# Patient Record
Sex: FEMALE | Race: WHITE | Employment: OTHER | URBAN - METROPOLITAN AREA
[De-identification: names, ages, dates, MRNs, and addresses within clinical notes are randomized per-mention and may not be internally consistent; named-entity substitution may affect disease eponyms.]

---

## 2016-11-25 LAB
CHOLESTEROL, TOTAL: NORMAL MG/DL
CHOLESTEROL/HDL RATIO: NORMAL
HDLC SERPL-MCNC: NORMAL MG/DL (ref 35–70)
LDL CHOLESTEROL CALCULATED: 85 MG/DL (ref 0–160)
TRIGL SERPL-MCNC: NORMAL MG/DL
VLDLC SERPL CALC-MCNC: NORMAL MG/DL

## 2017-02-08 ENCOUNTER — OFFICE VISIT (OUTPATIENT)
Dept: FAMILY MEDICINE CLINIC | Age: 67
End: 2017-02-08

## 2017-02-08 VITALS
HEIGHT: 66 IN | DIASTOLIC BLOOD PRESSURE: 70 MMHG | BODY MASS INDEX: 28.06 KG/M2 | HEART RATE: 74 BPM | SYSTOLIC BLOOD PRESSURE: 130 MMHG | WEIGHT: 174.6 LBS | RESPIRATION RATE: 14 BRPM

## 2017-02-08 DIAGNOSIS — M15.9 PRIMARY OSTEOARTHRITIS INVOLVING MULTIPLE JOINTS: ICD-10-CM

## 2017-02-08 DIAGNOSIS — I10 ESSENTIAL HYPERTENSION: Primary | ICD-10-CM

## 2017-02-08 PROCEDURE — 99213 OFFICE O/P EST LOW 20 MIN: CPT | Performed by: FAMILY MEDICINE

## 2017-02-08 RX ORDER — LISINOPRIL 5 MG/1
5 TABLET ORAL DAILY
Qty: 90 TABLET | Refills: 1 | Status: SHIPPED | OUTPATIENT
Start: 2017-02-08 | End: 2017-06-23 | Stop reason: SDUPTHER

## 2017-02-08 RX ORDER — ATENOLOL AND CHLORTHALIDONE TABLET 50; 25 MG/1; MG/1
1 TABLET ORAL 2 TIMES DAILY
Qty: 180 TABLET | Refills: 1 | Status: SHIPPED | OUTPATIENT
Start: 2017-02-08 | End: 2017-06-23 | Stop reason: SDUPTHER

## 2017-02-08 ASSESSMENT — PATIENT HEALTH QUESTIONNAIRE - PHQ9
1. LITTLE INTEREST OR PLEASURE IN DOING THINGS: 0
SUM OF ALL RESPONSES TO PHQ9 QUESTIONS 1 & 2: 0
SUM OF ALL RESPONSES TO PHQ QUESTIONS 1-9: 0
2. FEELING DOWN, DEPRESSED OR HOPELESS: 0

## 2017-02-08 ASSESSMENT — ENCOUNTER SYMPTOMS
ABDOMINAL PAIN: 0
EYES NEGATIVE: 1
CHEST TIGHTNESS: 0
COUGH: 0
CONSTIPATION: 0
NAUSEA: 0
SHORTNESS OF BREATH: 0
DIARRHEA: 0
VOMITING: 0
BACK PAIN: 1

## 2017-04-04 ENCOUNTER — TELEPHONE (OUTPATIENT)
Dept: FAMILY MEDICINE CLINIC | Age: 67
End: 2017-04-04

## 2017-04-12 ENCOUNTER — TELEPHONE (OUTPATIENT)
Dept: UROLOGY | Age: 67
End: 2017-04-12

## 2017-04-13 ENCOUNTER — TELEPHONE (OUTPATIENT)
Dept: UROLOGY | Age: 67
End: 2017-04-13

## 2017-05-17 ENCOUNTER — OFFICE VISIT (OUTPATIENT)
Dept: INTERNAL MEDICINE | Age: 67
End: 2017-05-17

## 2017-05-17 VITALS
HEIGHT: 66 IN | WEIGHT: 172 LBS | DIASTOLIC BLOOD PRESSURE: 80 MMHG | SYSTOLIC BLOOD PRESSURE: 144 MMHG | HEART RATE: 88 BPM | BODY MASS INDEX: 27.64 KG/M2

## 2017-05-17 DIAGNOSIS — R94.31 ABNORMAL EKG: ICD-10-CM

## 2017-05-17 DIAGNOSIS — Z01.818 PREOP EXAMINATION: Primary | ICD-10-CM

## 2017-05-17 DIAGNOSIS — M48.061 LUMBAR SPINAL STENOSIS: ICD-10-CM

## 2017-05-17 DIAGNOSIS — I10 ESSENTIAL HYPERTENSION: ICD-10-CM

## 2017-05-17 PROCEDURE — 3014F SCREEN MAMMO DOC REV: CPT | Performed by: INTERNAL MEDICINE

## 2017-05-17 PROCEDURE — G8420 CALC BMI NORM PARAMETERS: HCPCS | Performed by: INTERNAL MEDICINE

## 2017-05-17 PROCEDURE — 99204 OFFICE O/P NEW MOD 45 MIN: CPT | Performed by: INTERNAL MEDICINE

## 2017-05-17 PROCEDURE — G8427 DOCREV CUR MEDS BY ELIG CLIN: HCPCS | Performed by: INTERNAL MEDICINE

## 2017-05-17 PROCEDURE — 1036F TOBACCO NON-USER: CPT | Performed by: INTERNAL MEDICINE

## 2017-05-17 PROCEDURE — 1123F ACP DISCUSS/DSCN MKR DOCD: CPT | Performed by: INTERNAL MEDICINE

## 2017-05-17 PROCEDURE — 1090F PRES/ABSN URINE INCON ASSESS: CPT | Performed by: INTERNAL MEDICINE

## 2017-05-17 PROCEDURE — G8399 PT W/DXA RESULTS DOCUMENT: HCPCS | Performed by: INTERNAL MEDICINE

## 2017-05-17 PROCEDURE — 4040F PNEUMOC VAC/ADMIN/RCVD: CPT | Performed by: INTERNAL MEDICINE

## 2017-05-17 PROCEDURE — 3017F COLORECTAL CA SCREEN DOC REV: CPT | Performed by: INTERNAL MEDICINE

## 2017-06-23 ENCOUNTER — OFFICE VISIT (OUTPATIENT)
Dept: FAMILY MEDICINE CLINIC | Age: 67
End: 2017-06-23

## 2017-06-23 VITALS
SYSTOLIC BLOOD PRESSURE: 128 MMHG | WEIGHT: 172 LBS | HEIGHT: 66 IN | DIASTOLIC BLOOD PRESSURE: 76 MMHG | HEART RATE: 80 BPM | BODY MASS INDEX: 27.64 KG/M2 | RESPIRATION RATE: 14 BRPM

## 2017-06-23 DIAGNOSIS — M15.9 PRIMARY OSTEOARTHRITIS INVOLVING MULTIPLE JOINTS: ICD-10-CM

## 2017-06-23 DIAGNOSIS — I10 ESSENTIAL HYPERTENSION: Primary | ICD-10-CM

## 2017-06-23 PROCEDURE — 99213 OFFICE O/P EST LOW 20 MIN: CPT | Performed by: FAMILY MEDICINE

## 2017-06-23 RX ORDER — LISINOPRIL 5 MG/1
5 TABLET ORAL DAILY
Qty: 90 TABLET | Refills: 1 | Status: SHIPPED | OUTPATIENT
Start: 2017-06-23 | End: 2017-12-28 | Stop reason: SDUPTHER

## 2017-06-23 RX ORDER — OXYCODONE HYDROCHLORIDE AND ACETAMINOPHEN 5; 325 MG/1; MG/1
TABLET ORAL
Refills: 0 | COMMUNITY
Start: 2017-06-06 | End: 2017-07-07 | Stop reason: ALTCHOICE

## 2017-06-23 RX ORDER — ATENOLOL AND CHLORTHALIDONE TABLET 50; 25 MG/1; MG/1
1 TABLET ORAL 2 TIMES DAILY
Qty: 180 TABLET | Refills: 1 | Status: SHIPPED | OUTPATIENT
Start: 2017-06-23 | End: 2018-02-28 | Stop reason: SDUPTHER

## 2017-06-23 ASSESSMENT — ENCOUNTER SYMPTOMS
NAUSEA: 0
EYES NEGATIVE: 1
CONSTIPATION: 0
CHEST TIGHTNESS: 0
SHORTNESS OF BREATH: 0
COUGH: 0
DIARRHEA: 0
VOMITING: 0
BACK PAIN: 1
ABDOMINAL PAIN: 0

## 2017-07-07 ENCOUNTER — OFFICE VISIT (OUTPATIENT)
Dept: FAMILY MEDICINE CLINIC | Age: 67
End: 2017-07-07

## 2017-07-07 VITALS
HEART RATE: 76 BPM | DIASTOLIC BLOOD PRESSURE: 72 MMHG | SYSTOLIC BLOOD PRESSURE: 130 MMHG | HEIGHT: 66 IN | WEIGHT: 170.2 LBS | BODY MASS INDEX: 27.35 KG/M2 | TEMPERATURE: 98 F | RESPIRATION RATE: 14 BRPM

## 2017-07-07 DIAGNOSIS — R35.0 URINARY FREQUENCY: Primary | ICD-10-CM

## 2017-07-07 LAB
BACTERIA URINE, POC: ABNORMAL
BILIRUBIN URINE: 0 MG/DL
BLOOD, URINE: POSITIVE
CASTS URINE, POC: ABNORMAL
CLARITY: ABNORMAL
COLOR: YELLOW
CRYSTALS URINE, POC: ABNORMAL
EPI CELLS URINE, POC: ABNORMAL
GLUCOSE URINE: NEGATIVE
KETONES, URINE: NEGATIVE
LEUKOCYTE EST, POC: ABNORMAL
NITRITE, URINE: NEGATIVE
PH UA: 6 (ref 4.5–8)
PROTEIN UA: POSITIVE
RBC URINE, POC: ABNORMAL
SPECIFIC GRAVITY UA: 1.01 (ref 1–1.03)
UROBILINOGEN, URINE: NORMAL
WBC URINE, POC: ABNORMAL
YEAST URINE, POC: ABNORMAL

## 2017-07-07 PROCEDURE — 99213 OFFICE O/P EST LOW 20 MIN: CPT | Performed by: FAMILY MEDICINE

## 2017-07-07 PROCEDURE — 81000 URINALYSIS NONAUTO W/SCOPE: CPT | Performed by: FAMILY MEDICINE

## 2017-07-07 RX ORDER — CIPROFLOXACIN 500 MG/1
500 TABLET, FILM COATED ORAL 2 TIMES DAILY
Qty: 14 TABLET | Refills: 0 | Status: SHIPPED | OUTPATIENT
Start: 2017-07-07 | End: 2017-07-14

## 2017-07-07 ASSESSMENT — ENCOUNTER SYMPTOMS
CONSTIPATION: 0
NAUSEA: 0
DIARRHEA: 0
EYES NEGATIVE: 1
SHORTNESS OF BREATH: 0
ABDOMINAL PAIN: 0
BACK PAIN: 1
CHEST TIGHTNESS: 0
COUGH: 0
VOMITING: 0

## 2017-07-08 LAB
APPEARANCE: ABNORMAL
BACTERIA: ABNORMAL PER HPF
BILIRUBIN: NEGATIVE
COLOR: YELLOW
EPITHELIAL CELLS: ABNORMAL PER HPF
GLUCOSE BLD-MCNC: NEGATIVE MG/DL
KETONES, URINE: NEGATIVE
LEUKOCYTE ESTERASE, URINE: ABNORMAL
NITRITE, URINE: NEGATIVE
OCCULT BLOOD,URINE: ABNORMAL
PH: 6 (ref 5–9)
PROTEIN, URINE: ABNORMAL
RBC: ABNORMAL PER HPF (ref 0–5)
SP GRAVITY MISCELLANEOUS: 1.02 (ref 1–1.03)
UROBILINOGEN, URINE: NORMAL
WBC: >50 PER HPF (ref 0–5)

## 2017-07-10 LAB — URINE CULTURE, ROUTINE: NORMAL

## 2017-07-11 ENCOUNTER — TELEPHONE (OUTPATIENT)
Dept: FAMILY MEDICINE CLINIC | Age: 67
End: 2017-07-11

## 2017-07-11 DIAGNOSIS — R35.0 URINARY FREQUENCY: Primary | ICD-10-CM

## 2017-07-22 LAB
APPEARANCE: CLEAR
BILIRUBIN: NEGATIVE
COLOR: YELLOW
GLUCOSE BLD-MCNC: NEGATIVE MG/DL
KETONES, URINE: ABNORMAL
LEUKOCYTE ESTERASE, URINE: NEGATIVE
NITRITE, URINE: NEGATIVE
OCCULT BLOOD,URINE: NEGATIVE
PH: 6 (ref 5–9)
PROTEIN, URINE: NEGATIVE
SP GRAVITY MISCELLANEOUS: 1.02 (ref 1–1.03)
UROBILINOGEN, URINE: NORMAL

## 2017-08-21 ENCOUNTER — HOSPITAL ENCOUNTER (OUTPATIENT)
Dept: WOMENS IMAGING | Age: 67
Discharge: HOME | End: 2017-08-21
Payer: MEDICARE

## 2017-08-21 DIAGNOSIS — Z12.39 BREAST SCREENING: ICD-10-CM

## 2017-08-21 PROCEDURE — G0202 SCR MAMMO BI INCL CAD: HCPCS

## 2017-08-31 ENCOUNTER — HOSPITAL ENCOUNTER (OUTPATIENT)
Dept: INTERVENTIONAL RADIOLOGY/VASCULAR | Age: 67
Discharge: HOME | End: 2017-08-31
Payer: MEDICARE

## 2017-08-31 DIAGNOSIS — M79.18 PIRIFORMIS MUSCLE PAIN: ICD-10-CM

## 2017-08-31 PROCEDURE — 6360000002 HC RX W HCPCS

## 2017-08-31 PROCEDURE — 20552 NJX 1/MLT TRIGGER POINT 1/2: CPT

## 2017-08-31 PROCEDURE — 6360000004 HC RX CONTRAST MEDICATION: Performed by: RADIOLOGY

## 2017-08-31 PROCEDURE — 77002 NEEDLE LOCALIZATION BY XRAY: CPT

## 2017-08-31 PROCEDURE — 2500000003 HC RX 250 WO HCPCS

## 2017-08-31 RX ORDER — BUPIVACAINE HYDROCHLORIDE 2.5 MG/ML
5 INJECTION, SOLUTION EPIDURAL; INFILTRATION; INTRACAUDAL ONCE
Status: COMPLETED | OUTPATIENT
Start: 2017-08-31 | End: 2017-08-31

## 2017-08-31 RX ORDER — METHYLPREDNISOLONE ACETATE 80 MG/ML
80 INJECTION, SUSPENSION INTRA-ARTICULAR; INTRALESIONAL; INTRAMUSCULAR; SOFT TISSUE ONCE
Status: COMPLETED | OUTPATIENT
Start: 2017-08-31 | End: 2017-08-31

## 2017-08-31 RX ADMIN — IOHEXOL 1 ML: 180 INJECTION INTRAVENOUS at 09:15

## 2017-08-31 RX ADMIN — BUPIVACAINE HYDROCHLORIDE 4 ML: 2.5 INJECTION, SOLUTION EPIDURAL; INFILTRATION; INTRACAUDAL at 09:14

## 2017-08-31 RX ADMIN — METHYLPREDNISOLONE ACETATE 80 MG: 80 INJECTION, SUSPENSION INTRA-ARTICULAR; INTRALESIONAL; INTRAMUSCULAR; SOFT TISSUE at 09:15

## 2017-08-31 ASSESSMENT — PAIN DESCRIPTION - DESCRIPTORS: DESCRIPTORS: CONSTANT;SHARP

## 2017-08-31 ASSESSMENT — PAIN - FUNCTIONAL ASSESSMENT
PAIN_FUNCTIONAL_ASSESSMENT: 0-10
PAIN_FUNCTIONAL_ASSESSMENT: 0-10

## 2017-10-12 ENCOUNTER — HOSPITAL ENCOUNTER (OUTPATIENT)
Dept: INTERVENTIONAL RADIOLOGY/VASCULAR | Age: 67
Discharge: HOME | End: 2017-10-12
Payer: MEDICARE

## 2017-10-12 DIAGNOSIS — R52 PAIN: ICD-10-CM

## 2017-10-12 PROCEDURE — 6360000002 HC RX W HCPCS

## 2017-10-12 PROCEDURE — 77002 NEEDLE LOCALIZATION BY XRAY: CPT

## 2017-10-12 PROCEDURE — 2500000003 HC RX 250 WO HCPCS

## 2017-10-12 PROCEDURE — 20610 DRAIN/INJ JOINT/BURSA W/O US: CPT

## 2017-10-12 PROCEDURE — 6360000004 HC RX CONTRAST MEDICATION: Performed by: RADIOLOGY

## 2017-10-12 RX ORDER — BUPIVACAINE HYDROCHLORIDE 2.5 MG/ML
5 INJECTION, SOLUTION EPIDURAL; INFILTRATION; INTRACAUDAL ONCE
Status: COMPLETED | OUTPATIENT
Start: 2017-10-12 | End: 2017-10-12

## 2017-10-12 RX ORDER — METHYLPREDNISOLONE ACETATE 80 MG/ML
80 INJECTION, SUSPENSION INTRA-ARTICULAR; INTRALESIONAL; INTRAMUSCULAR; SOFT TISSUE ONCE
Status: COMPLETED | OUTPATIENT
Start: 2017-10-12 | End: 2017-10-12

## 2017-10-12 RX ADMIN — IOTHALAMATE MEGLUMINE 1 ML: 600 INJECTION INTRAVASCULAR at 10:48

## 2017-10-12 RX ADMIN — BUPIVACAINE HYDROCHLORIDE 10 MG: 2.5 INJECTION, SOLUTION EPIDURAL; INFILTRATION; INTRACAUDAL at 10:49

## 2017-10-12 RX ADMIN — METHYLPREDNISOLONE ACETATE 80 MG: 80 INJECTION, SUSPENSION INTRA-ARTICULAR; INTRALESIONAL; INTRAMUSCULAR; SOFT TISSUE at 10:49

## 2017-10-12 ASSESSMENT — PAIN SCALES - GENERAL: PAINLEVEL_OUTOF10: 6

## 2017-10-12 NOTE — PROGRESS NOTES
1039 Pt arrived in IR. C/O pain to left hip, 6/10  1045 Dr. ISAI Tucker in to evaluate pt.   1047 Procedure started. 1049 Procedure complete. 1050 Pt released in satisfactory condition, ambulatory. Skin pink, warm, dry. Respirations even and unlabored. Denies c/o pain at this time .

## 2017-10-27 ENCOUNTER — OFFICE VISIT (OUTPATIENT)
Dept: FAMILY MEDICINE CLINIC | Age: 67
End: 2017-10-27

## 2017-10-27 VITALS
DIASTOLIC BLOOD PRESSURE: 70 MMHG | BODY MASS INDEX: 27.77 KG/M2 | SYSTOLIC BLOOD PRESSURE: 126 MMHG | HEART RATE: 80 BPM | HEIGHT: 66 IN | WEIGHT: 172.8 LBS | RESPIRATION RATE: 14 BRPM

## 2017-10-27 DIAGNOSIS — I10 ESSENTIAL HYPERTENSION: ICD-10-CM

## 2017-10-27 DIAGNOSIS — Z23 IMMUNIZATION DUE: ICD-10-CM

## 2017-10-27 DIAGNOSIS — K21.9 GASTROESOPHAGEAL REFLUX DISEASE, ESOPHAGITIS PRESENCE NOT SPECIFIED: ICD-10-CM

## 2017-10-27 DIAGNOSIS — M15.9 PRIMARY OSTEOARTHRITIS INVOLVING MULTIPLE JOINTS: ICD-10-CM

## 2017-10-27 PROCEDURE — G0009 ADMIN PNEUMOCOCCAL VACCINE: HCPCS | Performed by: FAMILY MEDICINE

## 2017-10-27 PROCEDURE — 90732 PPSV23 VACC 2 YRS+ SUBQ/IM: CPT | Performed by: FAMILY MEDICINE

## 2017-10-27 PROCEDURE — G0008 ADMIN INFLUENZA VIRUS VAC: HCPCS | Performed by: FAMILY MEDICINE

## 2017-10-27 PROCEDURE — 99213 OFFICE O/P EST LOW 20 MIN: CPT | Performed by: FAMILY MEDICINE

## 2017-10-27 ASSESSMENT — PATIENT HEALTH QUESTIONNAIRE - PHQ9
1. LITTLE INTEREST OR PLEASURE IN DOING THINGS: 0
2. FEELING DOWN, DEPRESSED OR HOPELESS: 0
SUM OF ALL RESPONSES TO PHQ9 QUESTIONS 1 & 2: 0
SUM OF ALL RESPONSES TO PHQ QUESTIONS 1-9: 0

## 2017-10-27 ASSESSMENT — ENCOUNTER SYMPTOMS
CHEST TIGHTNESS: 0
DIARRHEA: 0
NAUSEA: 0
ABDOMINAL PAIN: 0
VOMITING: 0
EYES NEGATIVE: 1
SHORTNESS OF BREATH: 0
COUGH: 0
CONSTIPATION: 0

## 2017-10-27 NOTE — PROGRESS NOTES
Visit Date: 10/27/2017    Amol Ryder is a 75 y.o. female who presents today for:  Chief Complaint   Patient presents with    Hypertension       HPI:     HPI    has a current medication list which includes the following prescription(s): NONFORMULARY, atenolol-chlorthalidone, lisinopril, multiple vitamin, aspirin, calcium citrate, omeprazole, vitamin c, and cyclobenzaprine.     Allergies   Allergen Reactions    Minocycline Swelling    Sulfa Antibiotics Swelling    Bactrim Diarrhea    Erythromycin Diarrhea    Nortriptyline Itching    Vicodin [Hydrocodone-Acetaminophen] Other (See Comments)     Headache.  severe       Social History   Substance Use Topics    Smoking status: Never Smoker    Smokeless tobacco: Never Used    Alcohol use No       Past Medical History:   Diagnosis Date    Cervical radiculopathy     Degenerative arthritis of cervical spine     Degenerative lumbar disc     Fibromyalgia     GERD (gastroesophageal reflux disease)     Hydronephrosis 02/11/2016    right kidney    Hypertension     Osteoarthritis     neck,hands    Spinal stenosis     UPJ (ureteropelvic junction) obstruction 06/22/2002    Vitreous detachment of left eye 1996    Vitreous detachment of right eye 2005       Past Surgical History:   Procedure Laterality Date    BACK SURGERY  05/19/2017    L2-5 Decompression L2-5 posterior fusion and tlif by Dr David Oneill  08/30/2004    biopsy, [facility] Danielle Son  [facility]  02/07/2015    CARDIOVASCULAR STRESS TEST  2014    CATARACT REMOVAL WITH IMPLANT Left 04/25/2016    CATARACT REMOVAL WITH IMPLANT Right 05/09/2016    CERVICAL [facility] SURGERY  01/18/2008    C5-6 discectomy, Dr. Kadi Ruano -Paige Elliott  10/02 10/05 11/10   [facility]el [facility], [facility]    HIP ARTHROSCOPY Right 07/17/2014    labrum repair and PSCAS lengthening - Dr. Tea Azul in [facility]  08/18/2001    Dr. Eda Rosales - Reanna Louise 27.89 kg/m²   Wt Readings from Last 3 Encounters:   10/27/17 172 lb 12.8 oz (78.4 kg)   07/07/17 170 lb 3.2 oz (77.2 kg)   06/23/17 172 lb (78 kg)     Physical Exam   Constitutional: She is oriented to person, place, and time. She appears well-developed and well-nourished. No distress. HENT:   Head: Normocephalic and atraumatic. Eyes: Conjunctivae and EOM are normal. Pupils are equal, round, and reactive to light. Right eye exhibits no discharge. Left eye exhibits no discharge. No scleral icterus. Neck: Normal range of motion. Neck supple. No JVD present. No thyromegaly present. Cardiovascular: Normal rate, regular rhythm and normal heart sounds. No murmur heard. Pulmonary/Chest: Breath sounds normal. No respiratory distress. She has no wheezes. She has no rhonchi. She has no rales. Abdominal: Soft. Bowel sounds are normal. She exhibits no distension and no mass. There is no hepatosplenomegaly. There is no tenderness. There is no rebound and no guarding. Musculoskeletal: Normal range of motion. Lymphadenopathy:     She has no cervical adenopathy. Neurological: She is alert and oriented to person, place, and time. Skin: Skin is warm and dry. No rash noted. She is not diaphoretic. Psychiatric: She has a normal mood and affect. Her behavior is normal.   Nursing note and vitals reviewed. Assessment:      1. Essential hypertension     2. Primary osteoarthritis involving multiple joints     3. Gastroesophageal reflux disease, esophagitis presence not specified     4.  Immunization due  INFLUENZA, MDCK QUADV, 4 YRS AND OLDER, IM, PF, PREFILL SYR OR SDV, 0.5ML (FLUCELVAX QUADV, PF)    Pneumococcal polysaccharide vaccine 23-valent greater than or equal to 1yo subcutaneous/IM     Plan:      Requested Prescriptions      No prescriptions requested or ordered in this encounter     Current Outpatient Prescriptions   Medication Sig Dispense Refill    NONFORMULARY Take 2 tablets by mouth daily Eye [facility]

## 2017-10-31 LAB
CREAT SERPL-MCNC: 0.8 MG/DL (ref 0.5–1.3)
EGFR AFRICAN AMERICAN: 86
EGFR IF NONAFRICAN AMERICAN: 71

## 2017-11-02 ENCOUNTER — TELEPHONE (OUTPATIENT)
Dept: UROLOGY | Age: 67
End: 2017-11-02

## 2017-11-02 DIAGNOSIS — N32.0 STENOSIS (ACQUIRED) OF BLADDER NECK OR VESICOURETHRAL ORIFICE: Primary | ICD-10-CM

## 2017-11-03 ENCOUNTER — HOSPITAL ENCOUNTER (OUTPATIENT)
Dept: NUCLEAR MEDICINE | Age: 67
Discharge: HOME | End: 2017-11-03
Payer: MEDICARE

## 2017-11-03 VITALS — BODY MASS INDEX: 27.48 KG/M2 | WEIGHT: 171 LBS | HEIGHT: 66 IN

## 2017-11-03 DIAGNOSIS — N32.0 STENOSIS (ACQUIRED) OF BLADDER NECK OR VESICOURETHRAL ORIFICE: ICD-10-CM

## 2017-11-03 PROCEDURE — 6360000002 HC RX W HCPCS

## 2017-11-03 PROCEDURE — 78708 K FLOW/FUNCT IMAGE W/DRUG: CPT

## 2017-11-03 PROCEDURE — 3430000000 HC RX DIAGNOSTIC RADIOPHARMACEUTICAL: Performed by: NURSE PRACTITIONER

## 2017-11-03 PROCEDURE — A9562 TC99M MERTIATIDE: HCPCS | Performed by: NURSE PRACTITIONER

## 2017-11-03 RX ORDER — FUROSEMIDE 10 MG/ML
40 INJECTION INTRAMUSCULAR; INTRAVENOUS ONCE
Status: COMPLETED | OUTPATIENT
Start: 2017-11-03 | End: 2017-11-03

## 2017-11-03 RX ADMIN — Medication 10.2 MILLICURIE: at 13:28

## 2017-11-03 RX ADMIN — FUROSEMIDE 40 MG: 10 INJECTION INTRAMUSCULAR; INTRAVENOUS at 13:38

## 2017-11-15 ENCOUNTER — OFFICE VISIT (OUTPATIENT)
Dept: UROLOGY | Age: 67
End: 2017-11-15
Payer: MEDICARE

## 2017-11-15 VITALS
WEIGHT: 174 LBS | HEIGHT: 66 IN | BODY MASS INDEX: 27.97 KG/M2 | SYSTOLIC BLOOD PRESSURE: 138 MMHG | DIASTOLIC BLOOD PRESSURE: 80 MMHG

## 2017-11-15 DIAGNOSIS — Q62.11 STENOSIS OF URETEROPELVIC JUNCTION (UPJ): Primary | ICD-10-CM

## 2017-11-15 LAB
BILIRUBIN URINE: NEGATIVE
BLOOD URINE, POC: NEGATIVE
CHARACTER, URINE: CLEAR
COLOR, URINE: YELLOW
GLUCOSE URINE: NEGATIVE MG/DL
KETONES, URINE: NEGATIVE
LEUKOCYTE CLUMPS, URINE: NEGATIVE
NITRITE, URINE: NEGATIVE
PH, URINE: 5.5
PROTEIN, URINE: NEGATIVE MG/DL
SPECIFIC GRAVITY, URINE: 1.02 (ref 1–1.03)
UROBILINOGEN, URINE: 0.2 EU/DL

## 2017-11-15 PROCEDURE — G8427 DOCREV CUR MEDS BY ELIG CLIN: HCPCS | Performed by: NURSE PRACTITIONER

## 2017-11-15 PROCEDURE — 99213 OFFICE O/P EST LOW 20 MIN: CPT | Performed by: NURSE PRACTITIONER

## 2017-11-15 PROCEDURE — 3017F COLORECTAL CA SCREEN DOC REV: CPT | Performed by: NURSE PRACTITIONER

## 2017-11-15 PROCEDURE — G8399 PT W/DXA RESULTS DOCUMENT: HCPCS | Performed by: NURSE PRACTITIONER

## 2017-11-15 PROCEDURE — G8484 FLU IMMUNIZE NO ADMIN: HCPCS | Performed by: NURSE PRACTITIONER

## 2017-11-15 PROCEDURE — G8417 CALC BMI ABV UP PARAM F/U: HCPCS | Performed by: NURSE PRACTITIONER

## 2017-11-15 PROCEDURE — 4040F PNEUMOC VAC/ADMIN/RCVD: CPT | Performed by: NURSE PRACTITIONER

## 2017-11-15 PROCEDURE — 1090F PRES/ABSN URINE INCON ASSESS: CPT | Performed by: NURSE PRACTITIONER

## 2017-11-15 PROCEDURE — 81003 URINALYSIS AUTO W/O SCOPE: CPT | Performed by: NURSE PRACTITIONER

## 2017-11-15 PROCEDURE — 1123F ACP DISCUSS/DSCN MKR DOCD: CPT | Performed by: NURSE PRACTITIONER

## 2017-11-15 PROCEDURE — 1036F TOBACCO NON-USER: CPT | Performed by: NURSE PRACTITIONER

## 2017-11-15 PROCEDURE — 3014F SCREEN MAMMO DOC REV: CPT | Performed by: NURSE PRACTITIONER

## 2017-11-15 NOTE — PROGRESS NOTES
Radha Holm is a 75 y.o. female was seen in follow up for R UPJ stenosis. Pt underwent a cystoscopy with right retrograde pyelogram, right ureteroscopy, balloon dilation of right UPJ and placement of right ureteral stent 2/23/16 by Dr. Barry Mcneill. Stent was removed 3/12/16. She is here today in follow-up with renal lasix scan prior. Eloise Zavala reports she has been doing well. She denies any flank pain, dysuria, gross hematuria, fever, chills. She is planning to undergo a hip replacement on 12/11/17. Current Outpatient Prescriptions   Medication Sig Dispense Refill    NONFORMULARY Take 2 tablets by mouth daily Eye promise      atenolol-chlorthalidone (TENORETIC) 50-25 MG per tablet Take 1 tablet by mouth 2 times daily 180 tablet 1    lisinopril (PRINIVIL;ZESTRIL) 5 MG tablet Take 1 tablet by mouth daily 90 tablet 1    Multiple Vitamin TABS Take by mouth every morning      aspirin 81 MG tablet Take 81 mg by mouth daily      CALCIUM CITRATE PO Take 1,260 mg by mouth 2 times daily       omeprazole (PRILOSEC) 20 MG capsule Take 1 capsule by mouth daily as needed   2    Ascorbic Acid (VITAMIN C) 1000 MG tablet Take 1,000 mg by mouth daily       cyclobenzaprine (FLEXERIL) 10 MG tablet Take 10 mg by mouth nightly. No current facility-administered medications for this visit. Past Medical History  Eloise Zavala  has a past medical history of Cervical radiculopathy; Degenerative arthritis of cervical spine; Degenerative lumbar disc; Fibromyalgia; GERD (gastroesophageal reflux disease); Hydronephrosis; Hypertension; Osteoarthritis; Spinal stenosis; UPJ (ureteropelvic junction) obstruction; Vitreous detachment of left eye; and Vitreous detachment of right eye. Past Surgical History  The patient  has a past surgical history that includes shoulder surgery (Right, 1972); Dilation and curettage of uterus (1980); Tonsillectomy and adenoidectomy (1954); Lumbar disc surgery (05/2000);  Tubal ligation (0440); Cervical disc surgery (01/18/2008); Colonoscopy (10/02 10/05 11/10); Hip arthroscopy (Right, 07/17/2014); Nerve Block (Right, 08/23/2016); lumbar fusion (01/2001); Hysterectomy (08/18/2001); Breast surgery (08/30/2004); Rotator cuff repair (Right, 04/23/2012); shoulder surgery (Left, 09/22/2014); joint replacement (Right, 12/16/2014); Cataract removal with implant (Left, 04/25/2016); Cataract removal with implant (Right, 05/09/2016); Ureter stent placement (02/23/2016); Lumbar spine surgery (08/23/2016); Cardiac catheterization (02/07/2015); cardiovascular stress test (2014); and back surgery (05/19/2017). Family History  This patient's family history includes Arthritis in her father; Cancer in her father; Diabetes in her paternal grandmother; Heart Disease in her mother; Heart Disease (age of onset: 61) in her father; High Blood Pressure in her sister; High Cholesterol in her sister; Kidney Disease in her father; Other in her father; Stroke in her paternal grandmother. Social History  Merary Fu  reports that she has never smoked. She has never used smokeless tobacco. She reports that she does not drink alcohol or use drugs. Review of Systems  No problems with ears, nose or throat. No problems with eyes. No chest pain, shortness of breath, abdominal pain, extremity pain or weakness, and no neurological deficits. No rashes. No swollen glands or lymph nodes.  symptoms per HPI. The remainder of the review of symptoms is negative. Exam  /80   Ht 5' 6\" (1.676 m)   Wt 174 lb (78.9 kg)   BMI 28.08 kg/m²   Nursing note and vitals reviewed. Constitutional: Alert and oriented times 3, no acute distress and cooperative to examination with appropriate mood and affect. HENT:   Head:        Normocephalic and atraumatic. Mouth/Throat:         Mucous membranes are normal.   Eyes:         EOM are normal. No scleral icterus. PERRLA.    Neck:        Supple, symmetrical, trachea midline, no adenopathy, thyroid symmetric, not enlarged and no tenderness. Cardiovascular:        Normal rate, regular rhythm, S1 S2 heart sounds. No murmurs, rub, or gallops. Pulses:       Radial pulses are 2+/4 bilateral and equal. Posterior tibialis 2+/4 bilateral and equal  Pulmonary/Chest:      Chest symmetric with normal A/P diameter,  CTA with no wheezes, rales, or rhonchi noted. Normal respiratory rate and rhthym. No use of accessory muscles. Abdominal:         Soft. No tenderness. No rebound, no guarding and no CVA tenderness. Bowel sounds present. Musculoskeletal:         Normal range of motion. No edema or tenderness of lower extremities. Lymphadenopathy:        No cervical adenopathy. Bilateral supraclavicular adenopathy absent. Extremities: No cyanosis, clubbing, or edema present. Neurological:        Alert and oriented. No cranial nerve deficit. There are no focalizing motor or sensory deficits. CN II-XII are grossly intact. Psychiatric:        Normal mood and affect. Labs   Urine dip demonstrates   Results for POC orders placed in visit on 11/15/17   POCT Urinalysis No Micro (Auto)   Result Value Ref Range    Glucose, Ur Negative NEGATIVE mg/dl    Bilirubin Urine Negative     Ketones, Urine Negative NEGATIVE    Specific Gravity, Urine 1.020 1.002 - 1.03    Blood, UA POC Negative NEGATIVE    pH, Urine 5.50 5.0 - 9.0    Protein, Urine Negative NEGATIVE mg/dl    Urobilinogen, Urine 0.20 0.0 - 1.0 eu/dl    Nitrite, Urine Negative NEGATIVE    Leukocyte Clumps, Urine Negative NEGATIVE    Color, Urine Yellow YELLOW-STR    Character, Urine Clear [email or web address]       Patients recent PSA values are as follows  No results found for: PSA, PSADIA     Recent BUN/Creatinine:  Lab Results   Component Value Date    BUN 13 05/22/2017    CREATININE 0.8 10/30/2017       Radiology  The patient has had a Lasix Renal Scan which I have reviewed along with its accompanying report.   The study demonstrates renal function of 52% on the left and 48% on the right. T 1/2 life of 9.5 on the left and 16 on the right. Assessment & Plan  Hx R UPJ stenosis    Pt is doing well. No flank pain. No evidence of obstruction on Renal lasix scan. F/u with urology PRN. Pt instructed to call for any return of symptoms and verbalizes understanding. Return if symptoms worsen or fail to improve, for R UPJ stenosis.

## 2017-11-20 ENCOUNTER — HOSPITAL ENCOUNTER (OUTPATIENT)
Dept: GENERAL RADIOLOGY | Age: 67
Discharge: HOME | End: 2017-11-20
Payer: MEDICARE

## 2017-11-20 ENCOUNTER — HOSPITAL ENCOUNTER (OUTPATIENT)
Age: 67
Discharge: HOME | End: 2017-11-20
Payer: MEDICARE

## 2017-11-20 DIAGNOSIS — Z01.818 PRE-OP TESTING: ICD-10-CM

## 2017-11-20 LAB
ALBUMIN SERPL-MCNC: 4.5 G/DL (ref 3.5–5.1)
ALP BLD-CCNC: 55 U/L (ref 38–126)
ALT SERPL-CCNC: 15 U/L (ref 11–66)
ANION GAP SERPL CALCULATED.3IONS-SCNC: 16 MEQ/L (ref 8–16)
AST SERPL-CCNC: 21 U/L (ref 5–40)
BASOPHILS # BLD: 0.7 %
BASOPHILS ABSOLUTE: 0.1 THOU/MM3 (ref 0–0.1)
BILIRUB SERPL-MCNC: 0.5 MG/DL (ref 0.3–1.2)
BILIRUBIN DIRECT: < 0.2 MG/DL (ref 0–0.3)
BILIRUBIN URINE: NEGATIVE
BLOOD, URINE: NEGATIVE
BUN BLDV-MCNC: 20 MG/DL (ref 7–22)
CALCIUM SERPL-MCNC: 9.8 MG/DL (ref 8.5–10.5)
CHARACTER, URINE: CLEAR
CHLORIDE BLD-SCNC: 98 MEQ/L (ref 98–111)
CHOLESTEROL, TOTAL: 188 MG/DL (ref 100–199)
CO2: 25 MEQ/L (ref 23–33)
COLOR: YELLOW
CREAT SERPL-MCNC: 0.7 MG/DL (ref 0.4–1.2)
EKG ATRIAL RATE: 62 BPM
EKG P AXIS: 25 DEGREES
EKG P-R INTERVAL: 170 MS
EKG Q-T INTERVAL: 398 MS
EKG QRS DURATION: 90 MS
EKG QTC CALCULATION (BAZETT): 403 MS
EKG R AXIS: 20 DEGREES
EKG T AXIS: 10 DEGREES
EKG VENTRICULAR RATE: 62 BPM
EOSINOPHIL # BLD: 3.7 %
EOSINOPHILS ABSOLUTE: 0.3 THOU/MM3 (ref 0–0.4)
GFR SERPL CREATININE-BSD FRML MDRD: 83 ML/MIN/1.73M2
GLUCOSE BLD-MCNC: 105 MG/DL (ref 70–108)
GLUCOSE URINE: NEGATIVE MG/DL
HCT VFR BLD CALC: 38 % (ref 37–47)
HDLC SERPL-MCNC: 56 MG/DL
HEMOGLOBIN: 12.8 GM/DL (ref 12–16)
INR BLD: 1.01 (ref 0.85–1.13)
KETONES, URINE: NEGATIVE
LDL CHOLESTEROL CALCULATED: 102 MG/DL
LEUKOCYTE ESTERASE, URINE: NEGATIVE
LYMPHOCYTES # BLD: 33.7 %
LYMPHOCYTES ABSOLUTE: 2.8 THOU/MM3 (ref 1–4.8)
MCH RBC QN AUTO: 31.2 PG (ref 27–31)
MCHC RBC AUTO-ENTMCNC: 33.6 GM/DL (ref 33–37)
MCV RBC AUTO: 92.7 FL (ref 81–99)
MONOCYTES # BLD: 7.6 %
MONOCYTES ABSOLUTE: 0.6 THOU/MM3 (ref 0.4–1.3)
MRSA NASAL SCREEN RT-PCR: NEGATIVE
NITRITE, URINE: NEGATIVE
NUCLEATED RED BLOOD CELLS: 0 /100 WBC
PDW BLD-RTO: 14.3 % (ref 11.5–14.5)
PH UA: 6
PLATELET # BLD: 295 THOU/MM3 (ref 130–400)
PMV BLD AUTO: 7.5 MCM (ref 7.4–10.4)
POTASSIUM SERPL-SCNC: 3.9 MEQ/L (ref 3.5–5.2)
PROTEIN UA: NEGATIVE
RBC # BLD: 4.1 MILL/MM3 (ref 4.2–5.4)
SEG NEUTROPHILS: 54.3 %
SEGMENTED NEUTROPHILS ABSOLUTE COUNT: 4.6 THOU/MM3 (ref 1.8–7.7)
SODIUM BLD-SCNC: 139 MEQ/L (ref 135–145)
SPECIFIC GRAVITY, URINE: 1.02 (ref 1–1.03)
STAPH AUREUS SCREEN RT-PCR: POSITIVE
TOTAL PROTEIN: 7.8 G/DL (ref 6.1–8)
TRIGL SERPL-MCNC: 151 MG/DL (ref 0–199)
UROBILINOGEN, URINE: 0.2 EU/DL
WBC # BLD: 8.4 THOU/MM3 (ref 4.8–10.8)

## 2017-11-20 PROCEDURE — 71020 XR CHEST STANDARD TWO VW: CPT

## 2017-11-20 PROCEDURE — 72170 X-RAY EXAM OF PELVIS: CPT

## 2017-11-20 PROCEDURE — 82248 BILIRUBIN DIRECT: CPT

## 2017-11-20 PROCEDURE — 36415 COLL VENOUS BLD VENIPUNCTURE: CPT

## 2017-11-20 PROCEDURE — 80053 COMPREHEN METABOLIC PANEL: CPT

## 2017-11-20 PROCEDURE — 80061 LIPID PANEL: CPT

## 2017-11-20 PROCEDURE — 81003 URINALYSIS AUTO W/O SCOPE: CPT

## 2017-11-20 PROCEDURE — 87641 MR-STAPH DNA AMP PROBE: CPT

## 2017-11-20 PROCEDURE — 93005 ELECTROCARDIOGRAM TRACING: CPT

## 2017-11-20 PROCEDURE — 85025 COMPLETE CBC W/AUTO DIFF WBC: CPT

## 2017-11-20 PROCEDURE — 85610 PROTHROMBIN TIME: CPT

## 2017-12-11 ENCOUNTER — HOSPITAL ENCOUNTER (INPATIENT)
Age: 67
LOS: 2 days | Discharge: HOME | DRG: 470 | End: 2017-12-13
Attending: ORTHOPAEDIC SURGERY | Admitting: ORTHOPAEDIC SURGERY
Payer: MEDICARE

## 2017-12-11 ENCOUNTER — ANESTHESIA EVENT (OUTPATIENT)
Dept: OPERATING ROOM | Age: 67
DRG: 470 | End: 2017-12-11
Payer: MEDICARE

## 2017-12-11 ENCOUNTER — APPOINTMENT (OUTPATIENT)
Dept: GENERAL RADIOLOGY | Age: 67
DRG: 470 | End: 2017-12-11
Attending: ORTHOPAEDIC SURGERY
Payer: MEDICARE

## 2017-12-11 ENCOUNTER — ANESTHESIA (OUTPATIENT)
Dept: OPERATING ROOM | Age: 67
DRG: 470 | End: 2017-12-11
Payer: MEDICARE

## 2017-12-11 VITALS — OXYGEN SATURATION: 99 % | SYSTOLIC BLOOD PRESSURE: 104 MMHG | DIASTOLIC BLOOD PRESSURE: 58 MMHG | TEMPERATURE: 96.1 F

## 2017-12-11 PROBLEM — M16.12 PRIMARY OSTEOARTHRITIS OF LEFT HIP: Status: ACTIVE | Noted: 2017-12-11

## 2017-12-11 PROCEDURE — 2500000003 HC RX 250 WO HCPCS

## 2017-12-11 PROCEDURE — 1200000000 HC SEMI PRIVATE

## 2017-12-11 PROCEDURE — 6370000000 HC RX 637 (ALT 250 FOR IP): Performed by: ORTHOPAEDIC SURGERY

## 2017-12-11 PROCEDURE — C1776 JOINT DEVICE (IMPLANTABLE): HCPCS | Performed by: ORTHOPAEDIC SURGERY

## 2017-12-11 PROCEDURE — A6252 ABSORPT DRG >16 <=48 W/O BDR: HCPCS | Performed by: ORTHOPAEDIC SURGERY

## 2017-12-11 PROCEDURE — 7100000001 HC PACU RECOVERY - ADDTL 15 MIN: Performed by: ORTHOPAEDIC SURGERY

## 2017-12-11 PROCEDURE — 6360000002 HC RX W HCPCS: Performed by: ORTHOPAEDIC SURGERY

## 2017-12-11 PROCEDURE — 6360000002 HC RX W HCPCS: Performed by: NURSE ANESTHETIST, CERTIFIED REGISTERED

## 2017-12-11 PROCEDURE — 6360000002 HC RX W HCPCS

## 2017-12-11 PROCEDURE — 2580000003 HC RX 258: Performed by: ORTHOPAEDIC SURGERY

## 2017-12-11 PROCEDURE — 3600000005 HC SURGERY LEVEL 5 BASE: Performed by: ORTHOPAEDIC SURGERY

## 2017-12-11 PROCEDURE — 2500000003 HC RX 250 WO HCPCS: Performed by: ORTHOPAEDIC SURGERY

## 2017-12-11 PROCEDURE — 3600000015 HC SURGERY LEVEL 5 ADDTL 15MIN: Performed by: ORTHOPAEDIC SURGERY

## 2017-12-11 PROCEDURE — 3700000001 HC ADD 15 MINUTES (ANESTHESIA): Performed by: ORTHOPAEDIC SURGERY

## 2017-12-11 PROCEDURE — C1713 ANCHOR/SCREW BN/BN,TIS/BN: HCPCS | Performed by: ORTHOPAEDIC SURGERY

## 2017-12-11 PROCEDURE — 6360000002 HC RX W HCPCS: Performed by: ANESTHESIOLOGY

## 2017-12-11 PROCEDURE — 3700000000 HC ANESTHESIA ATTENDED CARE: Performed by: ORTHOPAEDIC SURGERY

## 2017-12-11 PROCEDURE — 7100000000 HC PACU RECOVERY - FIRST 15 MIN: Performed by: ORTHOPAEDIC SURGERY

## 2017-12-11 PROCEDURE — 0SRB0JA REPLACEMENT OF LEFT HIP JOINT WITH SYNTHETIC SUBSTITUTE, UNCEMENTED, OPEN APPROACH: ICD-10-PCS | Performed by: ORTHOPAEDIC SURGERY

## 2017-12-11 PROCEDURE — 73502 X-RAY EXAM HIP UNI 2-3 VIEWS: CPT

## 2017-12-11 PROCEDURE — 2500000003 HC RX 250 WO HCPCS: Performed by: NURSE ANESTHETIST, CERTIFIED REGISTERED

## 2017-12-11 DEVICE — REFLECTION INTERFIT THREADED HOLE COVER
Type: IMPLANTABLE DEVICE | Site: HIP | Status: FUNCTIONAL
Brand: REFLECTION

## 2017-12-11 DEVICE — R3 20 DEGREE XLPE ACETABULAR LINER                                    36MM INNER DIAMETER X OUTER DIAMETER 52MM
Type: IMPLANTABLE DEVICE | Site: HIP | Status: FUNCTIONAL
Brand: R3

## 2017-12-11 DEVICE — COBALT CHROME 12/14 TAPER FEMORAL                                    HEAD 36MM -3: Type: IMPLANTABLE DEVICE | Site: HIP | Status: FUNCTIONAL

## 2017-12-11 DEVICE — REFLECTION SPHERICAL HEAD SCREW 20MM
Type: IMPLANTABLE DEVICE | Site: HIP | Status: FUNCTIONAL
Brand: REFLECTION

## 2017-12-11 DEVICE — ANTHOLOGY STANDARD OFFSET POROUS                                    SIZE 7
Type: IMPLANTABLE DEVICE | Site: HIP | Status: FUNCTIONAL
Brand: ANTHOLOGY

## 2017-12-11 DEVICE — REFLECTION SPHERICAL HEAD SCREW 25MM
Type: IMPLANTABLE DEVICE | Site: HIP | Status: FUNCTIONAL
Brand: REFLECTION

## 2017-12-11 DEVICE — R3 3 HOLE ACETABULAR SHELL 52MM
Type: IMPLANTABLE DEVICE | Site: HIP | Status: FUNCTIONAL
Brand: R3 ACETABULAR

## 2017-12-11 RX ORDER — MEPERIDINE HYDROCHLORIDE 25 MG/ML
12.5 INJECTION INTRAMUSCULAR; INTRAVENOUS; SUBCUTANEOUS ONCE
Status: COMPLETED | OUTPATIENT
Start: 2017-12-11 | End: 2017-12-11

## 2017-12-11 RX ORDER — KETOROLAC TROMETHAMINE 30 MG/ML
30 INJECTION, SOLUTION INTRAMUSCULAR; INTRAVENOUS ONCE
Status: COMPLETED | OUTPATIENT
Start: 2017-12-11 | End: 2017-12-11

## 2017-12-11 RX ORDER — ONDANSETRON 2 MG/ML
INJECTION INTRAMUSCULAR; INTRAVENOUS PRN
Status: DISCONTINUED | OUTPATIENT
Start: 2017-12-11 | End: 2017-12-11 | Stop reason: SDUPTHER

## 2017-12-11 RX ORDER — SODIUM CHLORIDE, SODIUM LACTATE, POTASSIUM CHLORIDE, CALCIUM CHLORIDE 600; 310; 30; 20 MG/100ML; MG/100ML; MG/100ML; MG/100ML
INJECTION, SOLUTION INTRAVENOUS CONTINUOUS
Status: DISCONTINUED | OUTPATIENT
Start: 2017-12-11 | End: 2017-12-12

## 2017-12-11 RX ORDER — ASCORBIC ACID 500 MG
1000 TABLET ORAL DAILY
Status: DISCONTINUED | OUTPATIENT
Start: 2017-12-11 | End: 2017-12-13 | Stop reason: HOSPADM

## 2017-12-11 RX ORDER — HYDROCODONE BITARTRATE AND ACETAMINOPHEN 5; 325 MG/1; MG/1
2 TABLET ORAL EVERY 4 HOURS PRN
Status: CANCELLED | OUTPATIENT
Start: 2017-12-11

## 2017-12-11 RX ORDER — CYCLOBENZAPRINE HCL 10 MG
10 TABLET ORAL NIGHTLY
Status: DISCONTINUED | OUTPATIENT
Start: 2017-12-11 | End: 2017-12-13 | Stop reason: HOSPADM

## 2017-12-11 RX ORDER — LIDOCAINE HYDROCHLORIDE 20 MG/ML
INJECTION, SOLUTION INFILTRATION; PERINEURAL PRN
Status: DISCONTINUED | OUTPATIENT
Start: 2017-12-11 | End: 2017-12-11 | Stop reason: SDUPTHER

## 2017-12-11 RX ORDER — OXYCODONE HYDROCHLORIDE AND ACETAMINOPHEN 5; 325 MG/1; MG/1
2 TABLET ORAL EVERY 4 HOURS PRN
Status: DISCONTINUED | OUTPATIENT
Start: 2017-12-11 | End: 2017-12-13 | Stop reason: HOSPADM

## 2017-12-11 RX ORDER — MIDAZOLAM HYDROCHLORIDE 1 MG/ML
INJECTION INTRAMUSCULAR; INTRAVENOUS PRN
Status: DISCONTINUED | OUTPATIENT
Start: 2017-12-11 | End: 2017-12-11 | Stop reason: SDUPTHER

## 2017-12-11 RX ORDER — CHLORTHALIDONE 25 MG/1
25 TABLET ORAL 2 TIMES DAILY
Status: DISCONTINUED | OUTPATIENT
Start: 2017-12-11 | End: 2017-12-13 | Stop reason: HOSPADM

## 2017-12-11 RX ORDER — PROMETHAZINE HYDROCHLORIDE 25 MG/ML
12.5 INJECTION, SOLUTION INTRAMUSCULAR; INTRAVENOUS
Status: DISCONTINUED | OUTPATIENT
Start: 2017-12-11 | End: 2017-12-11 | Stop reason: HOSPADM

## 2017-12-11 RX ORDER — KETAMINE HYDROCHLORIDE 50 MG/ML
INJECTION, SOLUTION, CONCENTRATE INTRAMUSCULAR; INTRAVENOUS PRN
Status: DISCONTINUED | OUTPATIENT
Start: 2017-12-11 | End: 2017-12-11 | Stop reason: SDUPTHER

## 2017-12-11 RX ORDER — MEPERIDINE HYDROCHLORIDE 25 MG/ML
INJECTION INTRAMUSCULAR; INTRAVENOUS; SUBCUTANEOUS
Status: COMPLETED
Start: 2017-12-11 | End: 2017-12-11

## 2017-12-11 RX ORDER — MULTIVITAMIN WITH FOLIC ACID 400 MCG
1 TABLET ORAL EVERY MORNING
Status: DISCONTINUED | OUTPATIENT
Start: 2017-12-12 | End: 2017-12-13 | Stop reason: HOSPADM

## 2017-12-11 RX ORDER — SODIUM CHLORIDE 9 MG/ML
INJECTION, SOLUTION INTRAVENOUS CONTINUOUS
Status: DISCONTINUED | OUTPATIENT
Start: 2017-12-11 | End: 2017-12-12

## 2017-12-11 RX ORDER — TRANEXAMIC ACID 100 MG/ML
INJECTION, SOLUTION INTRAVENOUS PRN
Status: DISCONTINUED | OUTPATIENT
Start: 2017-12-11 | End: 2017-12-11 | Stop reason: HOSPADM

## 2017-12-11 RX ORDER — NEOSTIGMINE METHYLSULFATE 1 MG/ML
INJECTION, SOLUTION INTRAVENOUS PRN
Status: DISCONTINUED | OUTPATIENT
Start: 2017-12-11 | End: 2017-12-11 | Stop reason: SDUPTHER

## 2017-12-11 RX ORDER — SODIUM CHLORIDE 0.9 % (FLUSH) 0.9 %
10 SYRINGE (ML) INJECTION EVERY 12 HOURS SCHEDULED
Status: DISCONTINUED | OUTPATIENT
Start: 2017-12-11 | End: 2017-12-13 | Stop reason: HOSPADM

## 2017-12-11 RX ORDER — ATENOLOL AND CHLORTHALIDONE TABLET 50; 25 MG/1; MG/1
1 TABLET ORAL 2 TIMES DAILY
Status: DISCONTINUED | OUTPATIENT
Start: 2017-12-11 | End: 2017-12-11 | Stop reason: CLARIF

## 2017-12-11 RX ORDER — HYDROMORPHONE HCL 110MG/55ML
PATIENT CONTROLLED ANALGESIA SYRINGE INTRAVENOUS PRN
Status: DISCONTINUED | OUTPATIENT
Start: 2017-12-11 | End: 2017-12-11 | Stop reason: SDUPTHER

## 2017-12-11 RX ORDER — KETOROLAC TROMETHAMINE 30 MG/ML
INJECTION, SOLUTION INTRAMUSCULAR; INTRAVENOUS
Status: COMPLETED
Start: 2017-12-11 | End: 2017-12-11

## 2017-12-11 RX ORDER — OXYCODONE HYDROCHLORIDE AND ACETAMINOPHEN 5; 325 MG/1; MG/1
1 TABLET ORAL EVERY 4 HOURS PRN
Status: DISCONTINUED | OUTPATIENT
Start: 2017-12-11 | End: 2017-12-13 | Stop reason: HOSPADM

## 2017-12-11 RX ORDER — LISINOPRIL 5 MG/1
5 TABLET ORAL DAILY
Status: DISCONTINUED | OUTPATIENT
Start: 2017-12-12 | End: 2017-12-13 | Stop reason: HOSPADM

## 2017-12-11 RX ORDER — HYDROCODONE BITARTRATE AND ACETAMINOPHEN 5; 325 MG/1; MG/1
1 TABLET ORAL EVERY 4 HOURS PRN
Status: CANCELLED | OUTPATIENT
Start: 2017-12-11

## 2017-12-11 RX ORDER — CALCIUM CARBONATE 500(1250)
1000 TABLET ORAL 2 TIMES DAILY
Status: DISCONTINUED | OUTPATIENT
Start: 2017-12-11 | End: 2017-12-13 | Stop reason: HOSPADM

## 2017-12-11 RX ORDER — PANTOPRAZOLE SODIUM 40 MG/1
40 TABLET, DELAYED RELEASE ORAL
Status: DISCONTINUED | OUTPATIENT
Start: 2017-12-12 | End: 2017-12-13 | Stop reason: HOSPADM

## 2017-12-11 RX ORDER — GLYCOPYRROLATE 0.2 MG/ML
INJECTION INTRAMUSCULAR; INTRAVENOUS PRN
Status: DISCONTINUED | OUTPATIENT
Start: 2017-12-11 | End: 2017-12-11 | Stop reason: SDUPTHER

## 2017-12-11 RX ORDER — ACETAMINOPHEN 325 MG/1
650 TABLET ORAL EVERY 4 HOURS PRN
Status: DISCONTINUED | OUTPATIENT
Start: 2017-12-11 | End: 2017-12-13 | Stop reason: HOSPADM

## 2017-12-11 RX ORDER — SODIUM CHLORIDE 0.9 % (FLUSH) 0.9 %
10 SYRINGE (ML) INJECTION PRN
Status: DISCONTINUED | OUTPATIENT
Start: 2017-12-11 | End: 2017-12-13 | Stop reason: HOSPADM

## 2017-12-11 RX ORDER — LABETALOL HYDROCHLORIDE 5 MG/ML
10 INJECTION, SOLUTION INTRAVENOUS EVERY 10 MIN PRN
Status: DISCONTINUED | OUTPATIENT
Start: 2017-12-11 | End: 2017-12-11 | Stop reason: HOSPADM

## 2017-12-11 RX ORDER — DOCUSATE SODIUM 100 MG/1
100 CAPSULE, LIQUID FILLED ORAL 2 TIMES DAILY
Status: DISCONTINUED | OUTPATIENT
Start: 2017-12-11 | End: 2017-12-13 | Stop reason: HOSPADM

## 2017-12-11 RX ORDER — PROPOFOL 10 MG/ML
INJECTION, EMULSION INTRAVENOUS PRN
Status: DISCONTINUED | OUTPATIENT
Start: 2017-12-11 | End: 2017-12-11 | Stop reason: SDUPTHER

## 2017-12-11 RX ORDER — ROCURONIUM BROMIDE 10 MG/ML
INJECTION, SOLUTION INTRAVENOUS PRN
Status: DISCONTINUED | OUTPATIENT
Start: 2017-12-11 | End: 2017-12-11 | Stop reason: SDUPTHER

## 2017-12-11 RX ORDER — ONDANSETRON 2 MG/ML
4 INJECTION INTRAMUSCULAR; INTRAVENOUS EVERY 6 HOURS PRN
Status: DISCONTINUED | OUTPATIENT
Start: 2017-12-11 | End: 2017-12-13 | Stop reason: HOSPADM

## 2017-12-11 RX ORDER — ATENOLOL 50 MG/1
50 TABLET ORAL 2 TIMES DAILY
Status: DISCONTINUED | OUTPATIENT
Start: 2017-12-11 | End: 2017-12-13 | Stop reason: HOSPADM

## 2017-12-11 RX ORDER — SODIUM CHLORIDE 9 MG/ML
INJECTION, SOLUTION INTRAVENOUS CONTINUOUS
Status: DISCONTINUED | OUTPATIENT
Start: 2017-12-11 | End: 2017-12-13 | Stop reason: HOSPADM

## 2017-12-11 RX ADMIN — HYDROMORPHONE HYDROCHLORIDE 0.4 MG: 2 INJECTION INTRAMUSCULAR; INTRAVENOUS; SUBCUTANEOUS at 12:45

## 2017-12-11 RX ADMIN — KETOROLAC TROMETHAMINE 30 MG: 30 INJECTION, SOLUTION INTRAMUSCULAR; INTRAVENOUS at 16:30

## 2017-12-11 RX ADMIN — DOCUSATE SODIUM 100 MG: 100 CAPSULE ORAL at 22:09

## 2017-12-11 RX ADMIN — CEFAZOLIN SODIUM 2 G: 2 SOLUTION INTRAVENOUS at 22:10

## 2017-12-11 RX ADMIN — PHENYLEPHRINE HYDROCHLORIDE 100 MCG: 10 INJECTION INTRAMUSCULAR; INTRAVENOUS; SUBCUTANEOUS at 13:37

## 2017-12-11 RX ADMIN — PHENYLEPHRINE HYDROCHLORIDE 100 MCG: 10 INJECTION INTRAMUSCULAR; INTRAVENOUS; SUBCUTANEOUS at 13:48

## 2017-12-11 RX ADMIN — PHENYLEPHRINE HYDROCHLORIDE 100 MCG: 10 INJECTION INTRAMUSCULAR; INTRAVENOUS; SUBCUTANEOUS at 14:09

## 2017-12-11 RX ADMIN — CEFAZOLIN SODIUM 2 G: 1 INJECTION, SOLUTION INTRAVENOUS at 13:01

## 2017-12-11 RX ADMIN — HYDROMORPHONE HYDROCHLORIDE 0.5 MG: 1 INJECTION, SOLUTION INTRAMUSCULAR; INTRAVENOUS; SUBCUTANEOUS at 16:15

## 2017-12-11 RX ADMIN — OXYCODONE HYDROCHLORIDE AND ACETAMINOPHEN 1 TABLET: 5; 325 TABLET ORAL at 20:16

## 2017-12-11 RX ADMIN — LIDOCAINE HYDROCHLORIDE 75 MG: 20 INJECTION, SOLUTION INFILTRATION; PERINEURAL at 12:45

## 2017-12-11 RX ADMIN — HYDROMORPHONE HYDROCHLORIDE 0.5 MG: 1 INJECTION, SOLUTION INTRAMUSCULAR; INTRAVENOUS; SUBCUTANEOUS at 15:55

## 2017-12-11 RX ADMIN — HYDROMORPHONE HYDROCHLORIDE 0.5 MG: 1 INJECTION, SOLUTION INTRAMUSCULAR; INTRAVENOUS; SUBCUTANEOUS at 15:40

## 2017-12-11 RX ADMIN — PROPOFOL 50 MG: 10 INJECTION, EMULSION INTRAVENOUS at 13:00

## 2017-12-11 RX ADMIN — ATENOLOL 50 MG: 50 TABLET ORAL at 22:10

## 2017-12-11 RX ADMIN — HYDROMORPHONE HYDROCHLORIDE 0.4 MG: 2 INJECTION INTRAMUSCULAR; INTRAVENOUS; SUBCUTANEOUS at 13:00

## 2017-12-11 RX ADMIN — CALCIUM 1000 MG: 500 TABLET ORAL at 22:10

## 2017-12-11 RX ADMIN — PHENYLEPHRINE HYDROCHLORIDE 100 MCG: 10 INJECTION INTRAMUSCULAR; INTRAVENOUS; SUBCUTANEOUS at 14:45

## 2017-12-11 RX ADMIN — PHENYLEPHRINE HYDROCHLORIDE 100 MCG: 10 INJECTION INTRAMUSCULAR; INTRAVENOUS; SUBCUTANEOUS at 13:05

## 2017-12-11 RX ADMIN — HYDROMORPHONE HYDROCHLORIDE 1.2 MG: 2 INJECTION INTRAMUSCULAR; INTRAVENOUS; SUBCUTANEOUS at 15:20

## 2017-12-11 RX ADMIN — KETAMINE HYDROCHLORIDE 25 MG: 50 INJECTION, SOLUTION INTRAMUSCULAR; INTRAVENOUS at 12:45

## 2017-12-11 RX ADMIN — NEOSTIGMINE METHYLSULFATE 4 MG: 1 INJECTION, SOLUTION INTRAVENOUS at 14:25

## 2017-12-11 RX ADMIN — GLYCOPYRROLATE 0.6 MG: 0.2 INJECTION, SOLUTION INTRAMUSCULAR; INTRAVENOUS at 14:25

## 2017-12-11 RX ADMIN — PROPOFOL 150 MG: 10 INJECTION, EMULSION INTRAVENOUS at 12:45

## 2017-12-11 RX ADMIN — CYCLOBENZAPRINE 10 MG: 10 TABLET, FILM COATED ORAL at 22:09

## 2017-12-11 RX ADMIN — MEPERIDINE HYDROCHLORIDE 12.5 MG: 25 INJECTION INTRAMUSCULAR; INTRAVENOUS; SUBCUTANEOUS at 16:10

## 2017-12-11 RX ADMIN — SODIUM CHLORIDE: 9 INJECTION, SOLUTION INTRAVENOUS at 10:59

## 2017-12-11 RX ADMIN — PHENYLEPHRINE HYDROCHLORIDE 200 MCG: 10 INJECTION INTRAMUSCULAR; INTRAVENOUS; SUBCUTANEOUS at 14:25

## 2017-12-11 RX ADMIN — SODIUM CHLORIDE: 9 INJECTION, SOLUTION INTRAVENOUS at 23:30

## 2017-12-11 RX ADMIN — PHENYLEPHRINE HYDROCHLORIDE 100 MCG: 10 INJECTION INTRAMUSCULAR; INTRAVENOUS; SUBCUTANEOUS at 13:00

## 2017-12-11 RX ADMIN — MIDAZOLAM HYDROCHLORIDE 2 MG: 1 INJECTION, SOLUTION INTRAMUSCULAR; INTRAVENOUS at 12:42

## 2017-12-11 RX ADMIN — PHENYLEPHRINE HYDROCHLORIDE 100 MCG: 10 INJECTION INTRAMUSCULAR; INTRAVENOUS; SUBCUTANEOUS at 14:50

## 2017-12-11 RX ADMIN — Medication 1000 MG: at 22:09

## 2017-12-11 RX ADMIN — CHLORTHALIDONE 25 MG: 25 TABLET ORAL at 22:09

## 2017-12-11 RX ADMIN — PHENYLEPHRINE HYDROCHLORIDE 100 MCG: 10 INJECTION INTRAMUSCULAR; INTRAVENOUS; SUBCUTANEOUS at 13:55

## 2017-12-11 RX ADMIN — Medication 10 MG: at 14:13

## 2017-12-11 RX ADMIN — LIDOCAINE HYDROCHLORIDE 25 MG: 20 INJECTION, SOLUTION INFILTRATION; PERINEURAL at 13:00

## 2017-12-11 RX ADMIN — KETOROLAC TROMETHAMINE 30 MG: 30 INJECTION, SOLUTION INTRAMUSCULAR at 16:30

## 2017-12-11 RX ADMIN — PHENYLEPHRINE HYDROCHLORIDE 100 MCG: 10 INJECTION INTRAMUSCULAR; INTRAVENOUS; SUBCUTANEOUS at 12:50

## 2017-12-11 RX ADMIN — PHENYLEPHRINE HYDROCHLORIDE 100 MCG: 10 INJECTION INTRAMUSCULAR; INTRAVENOUS; SUBCUTANEOUS at 14:05

## 2017-12-11 RX ADMIN — Medication 50 MG: at 12:45

## 2017-12-11 RX ADMIN — ONDANSETRON 4 MG: 2 INJECTION INTRAMUSCULAR; INTRAVENOUS at 14:20

## 2017-12-11 RX ADMIN — SODIUM CHLORIDE: 9 INJECTION, SOLUTION INTRAVENOUS at 13:40

## 2017-12-11 RX ADMIN — MEPERIDINE HYDROCHLORIDE 12.5 MG: 25 INJECTION, SOLUTION INTRAMUSCULAR; INTRAVENOUS; SUBCUTANEOUS at 16:10

## 2017-12-11 RX ADMIN — HYDROMORPHONE HYDROCHLORIDE 0.5 MG: 1 INJECTION, SOLUTION INTRAMUSCULAR; INTRAVENOUS; SUBCUTANEOUS at 16:20

## 2017-12-11 RX ADMIN — KETAMINE HYDROCHLORIDE 25 MG: 50 INJECTION, SOLUTION INTRAMUSCULAR; INTRAVENOUS at 13:00

## 2017-12-11 ASSESSMENT — PULMONARY FUNCTION TESTS
PIF_VALUE: 19
PIF_VALUE: 19
PIF_VALUE: 18
PIF_VALUE: 18
PIF_VALUE: 19
PIF_VALUE: 20
PIF_VALUE: 19
PIF_VALUE: 22
PIF_VALUE: 18
PIF_VALUE: 19
PIF_VALUE: 19
PIF_VALUE: 1
PIF_VALUE: 19
PIF_VALUE: 19
PIF_VALUE: 23
PIF_VALUE: 22
PIF_VALUE: 18
PIF_VALUE: 19
PIF_VALUE: 17
PIF_VALUE: 20
PIF_VALUE: 18
PIF_VALUE: 4
PIF_VALUE: 19
PIF_VALUE: 2
PIF_VALUE: 19
PIF_VALUE: 20
PIF_VALUE: 19
PIF_VALUE: 22
PIF_VALUE: 19
PIF_VALUE: 6
PIF_VALUE: 19
PIF_VALUE: 19
PIF_VALUE: 18
PIF_VALUE: 20
PIF_VALUE: 19
PIF_VALUE: 25
PIF_VALUE: 19
PIF_VALUE: 10
PIF_VALUE: 21
PIF_VALUE: 19
PIF_VALUE: 18
PIF_VALUE: 19
PIF_VALUE: 20
PIF_VALUE: 19
PIF_VALUE: 24
PIF_VALUE: 3
PIF_VALUE: 19
PIF_VALUE: 18
PIF_VALUE: 19
PIF_VALUE: 19
PIF_VALUE: 22
PIF_VALUE: 1
PIF_VALUE: 19
PIF_VALUE: 18
PIF_VALUE: 19
PIF_VALUE: 20
PIF_VALUE: 20
PIF_VALUE: 19
PIF_VALUE: 19
PIF_VALUE: 18
PIF_VALUE: 19
PIF_VALUE: 19
PIF_VALUE: 20
PIF_VALUE: 18
PIF_VALUE: 19
PIF_VALUE: 20
PIF_VALUE: 19
PIF_VALUE: 3
PIF_VALUE: 19
PIF_VALUE: 19
PIF_VALUE: 20
PIF_VALUE: 19
PIF_VALUE: 18
PIF_VALUE: 19
PIF_VALUE: 18
PIF_VALUE: 20
PIF_VALUE: 19
PIF_VALUE: 20
PIF_VALUE: 19
PIF_VALUE: 17
PIF_VALUE: 19
PIF_VALUE: 18
PIF_VALUE: 18
PIF_VALUE: 19
PIF_VALUE: 19
PIF_VALUE: 18
PIF_VALUE: 19
PIF_VALUE: 19
PIF_VALUE: 20
PIF_VALUE: 17
PIF_VALUE: 20
PIF_VALUE: 18
PIF_VALUE: 19
PIF_VALUE: 18
PIF_VALUE: 21
PIF_VALUE: 18
PIF_VALUE: 19
PIF_VALUE: 19
PIF_VALUE: 18
PIF_VALUE: 1
PIF_VALUE: 23
PIF_VALUE: 20
PIF_VALUE: 19
PIF_VALUE: 20
PIF_VALUE: 19
PIF_VALUE: 21
PIF_VALUE: 19
PIF_VALUE: 17
PIF_VALUE: 24
PIF_VALUE: 19
PIF_VALUE: 22
PIF_VALUE: 19
PIF_VALUE: 18
PIF_VALUE: 19
PIF_VALUE: 20
PIF_VALUE: 12
PIF_VALUE: 19
PIF_VALUE: 21
PIF_VALUE: 19

## 2017-12-11 ASSESSMENT — PAIN DESCRIPTION - ORIENTATION
ORIENTATION: LEFT

## 2017-12-11 ASSESSMENT — PAIN DESCRIPTION - DESCRIPTORS
DESCRIPTORS: ACHING

## 2017-12-11 ASSESSMENT — PAIN DESCRIPTION - PAIN TYPE
TYPE: SURGICAL PAIN
TYPE: CHRONIC PAIN
TYPE: SURGICAL PAIN
TYPE: ACUTE PAIN;SURGICAL PAIN

## 2017-12-11 ASSESSMENT — PAIN SCALES - GENERAL
PAINLEVEL_OUTOF10: 2
PAINLEVEL_OUTOF10: 3
PAINLEVEL_OUTOF10: 2
PAINLEVEL_OUTOF10: 5
PAINLEVEL_OUTOF10: 8
PAINLEVEL_OUTOF10: 3
PAINLEVEL_OUTOF10: 2
PAINLEVEL_OUTOF10: 7
PAINLEVEL_OUTOF10: 2
PAINLEVEL_OUTOF10: 9
PAINLEVEL_OUTOF10: 7
PAINLEVEL_OUTOF10: 8
PAINLEVEL_OUTOF10: 2
PAINLEVEL_OUTOF10: 2
PAINLEVEL_OUTOF10: 0
PAINLEVEL_OUTOF10: 3
PAINLEVEL_OUTOF10: 9
PAINLEVEL_OUTOF10: 2

## 2017-12-11 ASSESSMENT — PAIN DESCRIPTION - FREQUENCY
FREQUENCY: CONTINUOUS

## 2017-12-11 ASSESSMENT — PAIN DESCRIPTION - PROGRESSION: CLINICAL_PROGRESSION: NOT CHANGED

## 2017-12-11 ASSESSMENT — PAIN DESCRIPTION - ONSET
ONSET: ON-GOING

## 2017-12-11 ASSESSMENT — PAIN DESCRIPTION - LOCATION
LOCATION: HIP

## 2017-12-11 NOTE — ANESTHESIA PRE PROCEDURE
Reynaldo Wisdom SHOULDER SURGERY Left 09/22/2014    Dr. Josiane Arce    [id number]  02/23/2016    [facility]  D/T ureteropelvic junction obstruction, hydronephrosis       Social History:    Social History   Substance Use Topics    Smoking status: Never Smoker    Smokeless tobacco: Never Used    Alcohol use No                                Counseling given: Not Answered      Vital Signs (Current):   Vitals:    12/11/17 1017   BP: (!) 181/82   Pulse: 83   Resp: 20   Temp: 98.1 °F (36.7 °C)   TempSrc: Temporal   SpO2: 97%   Weight: 171 lb 6.4 oz (77.7 kg)   Height: 5' 6\" (1.676 m)                                              BP Readings from Last 3 Encounters:   12/11/17 (!) 181/82   11/15/17 138/80   10/27/17 126/70       NPO Status: Time of last liquid consumption: 0700 (sips with medication)                        Time of last solid consumption: 1930                        Date of last liquid consumption: 12/11/17                        Date of last solid food consumption: 12/10/17    BMI:   Wt Readings from Last 3 Encounters:   12/11/17 171 lb 6.4 oz (77.7 kg)   12/04/17 169 lb (76.7 kg)   11/15/17 174 lb (78.9 kg)     Body mass index is 27.66 kg/m².     CBC:   Lab Results   Component Value Date    WBC 8.4 11/20/2017    RBC 4.10 11/20/2017    RBC 15-20 07/07/2017    HGB 12.8 11/20/2017    HCT 38.0 11/20/2017    MCV 92.7 11/20/2017    RDW 14.3 11/20/2017     11/20/2017       CMP:   Lab Results   Component Value Date     11/20/2017    K 3.9 11/20/2017    CL 98 11/20/2017    CO2 25 11/20/2017    BUN 20 11/20/2017    CREATININE 0.7 11/20/2017    LABGLOM 83 11/20/2017    GLUCOSE 105 11/20/2017    PROT 7.8 11/20/2017    CALCIUM 9.8 11/20/2017    BILITOT 0.5 11/20/2017    BILITOT NEGATIVE 07/21/2017    ALKPHOS 55 11/20/2017    AST 21 11/20/2017    ALT 15 11/20/2017       POC Tests: No results for input(s):

## 2017-12-11 NOTE — PROGRESS NOTES
Patient admitted to [facility] room 15. Fall and allergy bands placed. SCD placed on right leg. Patient and family oriented to room and call light.  Pain goal after surgery is 6/10

## 2017-12-11 NOTE — H&P
[address] [place] [address]                         PREOPERATIVE HISTORY AND PHYSICAL    PATIENT NAME: Cesilia Lu                     :        1950  MED REC NO:   [id number]                           ROOM:  ACCOUNT NO:   [de-identified]                           ADMIT DATE: 2017  PROVIDER:     Phoebe Payne, [facility] dictating for Dr. Ethel Holman. DATE OF SURGERY:  2017. CHIEF COMPLAINT:  Left hip pain. HISTORY OF PRESENT ILLNESS:  The patient has had left hip pain for several  months to a year. She has had intra-articular hip injections in the past  and the last one only gave her relief of symptoms for about 3 hours. She  is simply miserable. She cannot do her activities of daily living and is  ready to go forward with left total hip arthroplasty. The patient has a  bone-on-bone osteoarthritis of the left hip. Therefore, Dr. Nayan Krishna  is recommending a left total hip arthroplasty. The patient has been  educated on the risks and benefits of the procedure including infection,  stiffness of the hip, loose components, wearing out of the components, leg  length discrepancy, and fracture as well as dislocation. The patient has  been educated on the pre and postoperative protocols, understanding the  risks and benefits as well as pre and postoperative protocols. The patient  has agreed to go forward with the above-stated procedure with Dr. Nayan Krishna on 2017. 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     PAST SURGICAL HISTORY:  Tonsillectomy, adenoidectomy, right shoulder  surgery, D and C, another D and C with tubal ligation, lumbar diskectomy,  spinal fusion of the lumbar spine, a D and C hysterectomy, left breast  biopsy which is negative, cervical diskectomy, right shoulder rotator cuff  repair, left shoulder rotator cuff repair, right total hip arthroplasty  which was preceded by a right hip labral repair, right kidney stent,  bilateral cataracts, heart catheterization, and an L2 through L5  laminectomy with PSF. FAMILY HISTORY:  Otherwise, noncontributory. SOCIAL HISTORY:  The patient denies the use of alcohol, tobacco or street  drugs. Intends to go home upon discharge from the hospital.    PRESENT MEDICATIONS:  Lisinopril, atenolol/chlorthalidone, cyclobenzaprine,  vitamin C, aspirin, calcium citrate with vitamin D, multivitamins,  EyePromise Restore, and nasal mupirocin. ALLERGIES:  MINOCYCLINE and SULFA caused facial swelling and lip swelling;  NORTRIPTYLINE causes itching or urticaria; VICODIN, bad headaches. REVIEW OF SYSTEMS:  Otherwise, noncontributory. PHYSICAL EXAMINATION:  GENERAL:  A well-developed, well-nourished 59-year-old white female, who is  alert and oriented x3, appropriate mood and affect, well dressed, well  groomed. HEENT:  Normocephalic and atraumatic. EOMs intact. No oropharyngitis. No  infection in the teeth. LUNGS:  Clear to auscultation bilaterally. CARDIOVASCULAR:  Regular rate and rhythm. No murmurs, rubs or gallops. ABDOMEN:  Soft, nontender, positive bowel sounds. MUSCULOSKELETAL:  Exam of the left hip today shows decreased internal and  external rotation. Crepitus throughout range of motion, pain with range of  motion, and limping on the left lower extremity. Calf is soft and  nontender. She is neurovascularly intact. Skin is intact. No rashes,  lesions, or abrasions noted. RADIOGRAPHS:  AP of the pelvis standing showed the bone-on-bone  osteoarthritis, inferior aspect of the hip joint which is bone-on-bone. She has the well-placed right total hip arthroplasty. IMPRESSION:  Left hip bone-on-bone osteoarthritis. PLAN:  Left total hip arthroplasty.     We are awaiting final written clearance from Dr. Martine Fine in

## 2017-12-11 NOTE — BRIEF OP NOTE
BRIEF OP NOTE    Pre Op Diagnosis:   oa l hip   S/p r thr    Post Op Diagnosis:  same    Operation:  l thr  Anthology 7 std neck,  36 mm cocr -3 mm neck,  52 mm R3cup with 25/20 mm screws and cap,  ID 36/OD 52/20 degree wright insert    Surgeon/Assistant:  shelby osorio/maribeth pac    Anesthesia/Assistant:  Perez godinez/vish baires    Type of Anesthesia:  general    Fluids:  1400 ml   200 urine    Blood Loss:  200 ml    Findings:  As above    Complications:  0    Surgical Specimen:  0      Bryant Lee MD  3:14 PM  12/11/2017

## 2017-12-12 LAB
ANION GAP SERPL CALCULATED.3IONS-SCNC: 9 MEQ/L (ref 8–16)
BASOPHILS # BLD: 0.4 %
BASOPHILS ABSOLUTE: 0 THOU/MM3 (ref 0–0.1)
BUN BLDV-MCNC: 15 MG/DL (ref 7–22)
CALCIUM SERPL-MCNC: 8.5 MG/DL (ref 8.5–10.5)
CHLORIDE BLD-SCNC: 103 MEQ/L (ref 98–111)
CO2: 24 MEQ/L (ref 23–33)
CREAT SERPL-MCNC: 0.5 MG/DL (ref 0.4–1.2)
EKG ATRIAL RATE: 87 BPM
EKG P AXIS: 69 DEGREES
EKG P-R INTERVAL: 182 MS
EKG Q-T INTERVAL: 340 MS
EKG QRS DURATION: 94 MS
EKG QTC CALCULATION (BAZETT): 409 MS
EKG R AXIS: 27 DEGREES
EKG T AXIS: 31 DEGREES
EKG VENTRICULAR RATE: 87 BPM
EOSINOPHIL # BLD: 2.9 %
EOSINOPHILS ABSOLUTE: 0.3 THOU/MM3 (ref 0–0.4)
GFR SERPL CREATININE-BSD FRML MDRD: > 90 ML/MIN/1.73M2
GLUCOSE BLD-MCNC: 113 MG/DL (ref 70–108)
HCT VFR BLD CALC: 27.6 % (ref 37–47)
HEMOGLOBIN: 9.2 GM/DL (ref 12–16)
LYMPHOCYTES # BLD: 22.6 %
LYMPHOCYTES ABSOLUTE: 2.2 THOU/MM3 (ref 1–4.8)
MCH RBC QN AUTO: 31.5 PG (ref 27–31)
MCHC RBC AUTO-ENTMCNC: 33.3 GM/DL (ref 33–37)
MCV RBC AUTO: 94.6 FL (ref 81–99)
MONOCYTES # BLD: 8.4 %
MONOCYTES ABSOLUTE: 0.8 THOU/MM3 (ref 0.4–1.3)
NUCLEATED RED BLOOD CELLS: 0 /100 WBC
PDW BLD-RTO: 13.1 % (ref 11.5–14.5)
PLATELET # BLD: 184 THOU/MM3 (ref 130–400)
PMV BLD AUTO: 7.1 MCM (ref 7.4–10.4)
POTASSIUM SERPL-SCNC: 3.7 MEQ/L (ref 3.5–5.2)
RBC # BLD: 2.91 MILL/MM3 (ref 4.2–5.4)
SEG NEUTROPHILS: 65.7 %
SEGMENTED NEUTROPHILS ABSOLUTE COUNT: 6.3 THOU/MM3 (ref 1.8–7.7)
SODIUM BLD-SCNC: 136 MEQ/L (ref 135–145)
WBC # BLD: 9.6 THOU/MM3 (ref 4.8–10.8)

## 2017-12-12 PROCEDURE — 6370000000 HC RX 637 (ALT 250 FOR IP): Performed by: ORTHOPAEDIC SURGERY

## 2017-12-12 PROCEDURE — G8978 MOBILITY CURRENT STATUS: HCPCS

## 2017-12-12 PROCEDURE — 97110 THERAPEUTIC EXERCISES: CPT

## 2017-12-12 PROCEDURE — 1200000000 HC SEMI PRIVATE

## 2017-12-12 PROCEDURE — G8979 MOBILITY GOAL STATUS: HCPCS

## 2017-12-12 PROCEDURE — 85025 COMPLETE CBC W/AUTO DIFF WBC: CPT

## 2017-12-12 PROCEDURE — G8988 SELF CARE GOAL STATUS: HCPCS

## 2017-12-12 PROCEDURE — G8987 SELF CARE CURRENT STATUS: HCPCS

## 2017-12-12 PROCEDURE — 93005 ELECTROCARDIOGRAM TRACING: CPT

## 2017-12-12 PROCEDURE — 97116 GAIT TRAINING THERAPY: CPT

## 2017-12-12 PROCEDURE — 36415 COLL VENOUS BLD VENIPUNCTURE: CPT

## 2017-12-12 PROCEDURE — 80048 BASIC METABOLIC PNL TOTAL CA: CPT

## 2017-12-12 PROCEDURE — 6370000000 HC RX 637 (ALT 250 FOR IP): Performed by: INTERNAL MEDICINE

## 2017-12-12 PROCEDURE — 97162 PT EVAL MOD COMPLEX 30 MIN: CPT

## 2017-12-12 PROCEDURE — 2580000003 HC RX 258: Performed by: ORTHOPAEDIC SURGERY

## 2017-12-12 PROCEDURE — 6360000002 HC RX W HCPCS: Performed by: ORTHOPAEDIC SURGERY

## 2017-12-12 PROCEDURE — 97165 OT EVAL LOW COMPLEX 30 MIN: CPT

## 2017-12-12 RX ORDER — ASPIRIN 81 MG/1
81 TABLET, CHEWABLE ORAL DAILY
Status: DISCONTINUED | OUTPATIENT
Start: 2017-12-12 | End: 2017-12-13 | Stop reason: HOSPADM

## 2017-12-12 RX ADMIN — OXYCODONE HYDROCHLORIDE AND ACETAMINOPHEN 2 TABLET: 5; 325 TABLET ORAL at 19:28

## 2017-12-12 RX ADMIN — CHLORTHALIDONE 25 MG: 25 TABLET ORAL at 08:15

## 2017-12-12 RX ADMIN — ASPIRIN 81 MG: 81 TABLET, CHEWABLE ORAL at 08:15

## 2017-12-12 RX ADMIN — ATENOLOL 50 MG: 50 TABLET ORAL at 19:28

## 2017-12-12 RX ADMIN — CYCLOBENZAPRINE 10 MG: 10 TABLET, FILM COATED ORAL at 19:27

## 2017-12-12 RX ADMIN — OXYCODONE HYDROCHLORIDE AND ACETAMINOPHEN 2 TABLET: 5; 325 TABLET ORAL at 15:00

## 2017-12-12 RX ADMIN — OXYCODONE HYDROCHLORIDE AND ACETAMINOPHEN 1 TABLET: 5; 325 TABLET ORAL at 04:39

## 2017-12-12 RX ADMIN — OXYCODONE HYDROCHLORIDE AND ACETAMINOPHEN 2 TABLET: 5; 325 TABLET ORAL at 23:44

## 2017-12-12 RX ADMIN — OXYCODONE HYDROCHLORIDE AND ACETAMINOPHEN 1 TABLET: 5; 325 TABLET ORAL at 06:50

## 2017-12-12 RX ADMIN — OXYCODONE HYDROCHLORIDE AND ACETAMINOPHEN 2 TABLET: 5; 325 TABLET ORAL at 11:08

## 2017-12-12 RX ADMIN — ENOXAPARIN SODIUM 30 MG: 30 INJECTION SUBCUTANEOUS at 08:17

## 2017-12-12 RX ADMIN — ENOXAPARIN SODIUM 30 MG: 30 INJECTION SUBCUTANEOUS at 19:28

## 2017-12-12 RX ADMIN — ATENOLOL 50 MG: 50 TABLET ORAL at 08:15

## 2017-12-12 RX ADMIN — CALCIUM 1000 MG: 500 TABLET ORAL at 19:28

## 2017-12-12 RX ADMIN — CHLORTHALIDONE 25 MG: 25 TABLET ORAL at 19:28

## 2017-12-12 RX ADMIN — CEFAZOLIN SODIUM 2 G: 2 SOLUTION INTRAVENOUS at 06:45

## 2017-12-12 RX ADMIN — DOCUSATE SODIUM 100 MG: 100 CAPSULE ORAL at 19:28

## 2017-12-12 RX ADMIN — OXYCODONE HYDROCHLORIDE AND ACETAMINOPHEN 1 TABLET: 5; 325 TABLET ORAL at 00:20

## 2017-12-12 RX ADMIN — LISINOPRIL 5 MG: 5 TABLET ORAL at 08:15

## 2017-12-12 RX ADMIN — DOCUSATE SODIUM 100 MG: 100 CAPSULE ORAL at 08:15

## 2017-12-12 RX ADMIN — Medication 1000 MG: at 08:15

## 2017-12-12 RX ADMIN — PANTOPRAZOLE SODIUM 40 MG: 40 TABLET, DELAYED RELEASE ORAL at 06:45

## 2017-12-12 RX ADMIN — THERA TABS 1 TABLET: TAB at 08:15

## 2017-12-12 RX ADMIN — CALCIUM 1000 MG: 500 TABLET ORAL at 08:15

## 2017-12-12 RX ADMIN — Medication 10 ML: at 19:29

## 2017-12-12 ASSESSMENT — PAIN DESCRIPTION - PAIN TYPE
TYPE: ACUTE PAIN;SURGICAL PAIN
TYPE: SURGICAL PAIN;ACUTE PAIN
TYPE: ACUTE PAIN;SURGICAL PAIN
TYPE: SURGICAL PAIN
TYPE: ACUTE PAIN;SURGICAL PAIN
TYPE: ACUTE PAIN;SURGICAL PAIN
TYPE: SURGICAL PAIN;ACUTE PAIN
TYPE: ACUTE PAIN;SURGICAL PAIN

## 2017-12-12 ASSESSMENT — PAIN DESCRIPTION - DESCRIPTORS
DESCRIPTORS: ACHING

## 2017-12-12 ASSESSMENT — PAIN SCALES - GENERAL
PAINLEVEL_OUTOF10: 4
PAINLEVEL_OUTOF10: 3
PAINLEVEL_OUTOF10: 5
PAINLEVEL_OUTOF10: 4
PAINLEVEL_OUTOF10: 5
PAINLEVEL_OUTOF10: 4
PAINLEVEL_OUTOF10: 4
PAINLEVEL_OUTOF10: 7
PAINLEVEL_OUTOF10: 5
PAINLEVEL_OUTOF10: 7
PAINLEVEL_OUTOF10: 4
PAINLEVEL_OUTOF10: 5
PAINLEVEL_OUTOF10: 5

## 2017-12-12 ASSESSMENT — PAIN DESCRIPTION - ONSET
ONSET: ON-GOING

## 2017-12-12 ASSESSMENT — PAIN DESCRIPTION - LOCATION
LOCATION: HIP

## 2017-12-12 ASSESSMENT — PAIN DESCRIPTION - FREQUENCY
FREQUENCY: CONTINUOUS

## 2017-12-12 ASSESSMENT — PAIN DESCRIPTION - ORIENTATION
ORIENTATION: LEFT

## 2017-12-12 ASSESSMENT — PAIN DESCRIPTION - PROGRESSION
CLINICAL_PROGRESSION: NOT CHANGED

## 2017-12-12 NOTE — PROGRESS NOTES
[address] [facility]    Time In: 1440  Time Out: 1503  Timed Code Treatment Minutes: 23 Minutes  Minutes: 23          Date: 2017  Patient Name: Abida Contreras,  Gender:  female        MRN: [id number]  : 1950  (75 y.o.)     Referring Practitioner: Dr Anabel Izquierdo  Diagnosis: Primary Osteoarthritis of left hip  Additional Pertinent Hx: Pt admitted  for THR left. Pt reports having had back sx in May.       Past Medical History:   Diagnosis Date    Cervical radiculopathy     Degenerative arthritis of cervical spine     Degenerative lumbar disc     Fibromyalgia     GERD (gastroesophageal reflux disease)     Hydronephrosis 2016    right kidney    Hypertension     Osteoarthritis     neck,hands    Spinal stenosis     UPJ (ureteropelvic junction) obstruction 2002    Vitreous detachment of left eye     Vitreous detachment of right eye      Past Surgical History:   Procedure Laterality Date    BACK SURGERY  2017    L2-5 Decompression L2-5 posterior fusion and tlif by Dr Jeremias Cisneros  2004    biopsy, Dr. Artem Lees - [facility] Hoskins  2015    CARDIOVASCULAR STRESS TEST  2014    CATARACT REMOVAL WITH IMPLANT Left 2016    CATARACT REMOVAL WITH IMPLANT Right 2016    CERVICAL One [address] Juan SURGERY  2008    C5-6 discectomy, Dr. Lizett Godinez -[facility] Deg[facility]  10/02 10/05 11/10   Ruth Dutta, [facility]    HIP ARTHROSCOPY Right 2014    labrum repair and PSCAS lengthening - Dr. Isabel Long in [facility]  2001    Dr. Ashvin Krishna - Jeovanny Contreras Right 2014    TOTAL HIP, DR. Doretha Calzada [facility]    LUMBAR [facility] [address] [facility] SURGERY  2000    L3-5 [place] [id number]  2001    L3-4 with cage, Dr. Mortimer Dear - [place] 2016    radiofrequency ablation Stand: Minimal Assistance (of 1 from bed, cues on hand placement )  Stand to sit: Contact guard assistance (to bed, better hand placement )    WB Status: WBAT Left THR   Ambulation 1  Surface: level tile  Device: Rolling Walker  Assistance: Contact guard assistance  Quality of Gait: slow pace, cues on technique, using more of a step too pattern with RW at this point ,decreased stance left LE, cues on posture   Distance: 50 feet        Overall Orientation Status: Within Functional Limits        Exercises:  Exercises  Comments: 10 reps quad and glut sets and ankle pumps and heelslides and hip abd/add   with Min A to improve strength for function       Activity Tolerance:  Activity Tolerance: Patient limited by pain; Patient limited by endurance    Assessment: Body structures, Functions, Activity limitations: Decreased safe awareness, Decreased balance, Decreased functional mobility , Decreased ROM, Decreased strength, Decreased endurance  Assessment: Pt with weakness and pain in left hip from THR sx yesterday causing her to require assistence for safe mobility and skilled PT to improve strength and mobility for household ambulation  Prognosis: Good  REQUIRES PT FOLLOW UP: Yes  Discharge Recommendations: Continue to assess pending progress, 24 hour supervision or assist    Patient Education:  Patient Education: POC,THR precautions    Equipment Recommendations:  Equipment Needed: No    Safety:  Type of devices:  All fall risk precautions in place, Call light within reach, Nurse notified, Left in bed  Restraints  Initially in place: No    Plan:  Times per week: BID 7 X O  Times per day: Twice a day  Specific instructions for Next Treatment: THR protocol, mobility, gait, car, steps   Current Treatment Recommendations: Strengthening, ROM, Transfer Training, Functional Mobility Training, Balance Training, Endurance Training, Gait Training, Stair training    Goals:  Patient goals : Go home with spouse     Short term goals  Time Frame for Short term goals: 2 weeks   Short term goal 1: supine to sit and return with SBA to get in and out of bed   Short term goal 2: sit to stand with SBA to get on and off various surfaces  Short term goal 3: ambulate with  feet with SBA to walk safely in the home  Short term goal 4: ascend/descend a platform step with RW and CGA and 3 standard steps with Min A and cane if available to enter home  Short term goal 5: car transfer with CGA to leave hospital for home    Long term goals  Time Frame for Long term goals : NA due to short ELOS            AM-PAC Inpatient Mobility without Stair Climbing Raw Score : 15  AM-PAC Inpatient without Stair Climbing T-Scale Score : 43.03  Mobility Inpatient CMS 0-100% Score: 47.43  Mobility Inpatient without Stair CMS G-Code Modifier : CK

## 2017-12-12 NOTE — PROGRESS NOTES
Jacque Quintana 59  INPATIENT OCCUPATIONAL THERAPY  [facility] ORTHOPEDICS 7K  EVALUATION    Time:  Time In: 818  Time Out: 2951  Timed Code Treatment Minutes: 8 Minutes  Minutes: 23          Date: 2017  Patient Name: Karri Medina,   Gender: female      MRN: [id number]  : 1950  (75 y.o.)  Referring Practitioner: Dr. Jennifer Gibson  Diagnosis: OA L hip  Additional Pertinent Hx: s/p L THR on 17. hx of R THR 3 years ago.      Restrictions/Precautions:  Restrictions/Precautions: Surgical Protocols, Weight Bearing, Fall Risk       Left Upper Extremity Weight Bearing: Weight Bearing As Tolerated    Position Activity Restriction  Hip Precautions: No hip flexion > 90 degrees, No ADduction, No hip internal rotation  Other position/activity restrictions: THR precautions, WBAT         Past Medical History:   Diagnosis Date    Cervical radiculopathy     Degenerative arthritis of cervical spine     Degenerative lumbar disc     Fibromyalgia     GERD (gastroesophageal reflux disease)     Hydronephrosis 2016    right kidney    Hypertension     Osteoarthritis     neck,hands    Spinal stenosis     UPJ (ureteropelvic junction) obstruction 2002    Vitreous detachment of left eye     Vitreous detachment of right eye      Past Surgical History:   Procedure Laterality Date    BACK SURGERY  2017    L2-5 Decompression L2-5 posterior fusion and tlif by Dr Khang Calero  2004    biopsy, Dr. Kvng Burrell  2015    CARDIOVASCULAR STRESS TEST  2014    CATARACT REMOVAL WITH IMPLANT Left 2016    CATARACT REMOVAL WITH IMPLANT Right 2016    CERVICAL [facility] [place] [facility] SURGERY  2008    C5-6 discectomy, Dr. Khris Mccann [place]  10/02 10/05 11/10   Salome Alston, [facility]    HIP ARTHROSCOPY Right 2014    labrum repair and PSCAS lengthening - Dr. Clara Ferguson in [facility] & ADLs PLOF though with pain. Objective    Overall Cognitive Status: WNL         Sensation  Overall Sensation Status: WNL              LUE AROM (degrees)  LUE AROM : WNL          RUE AROM (degrees)  RUE AROM : WNL       LUE Strength  Gross LUE Strength: WFL                RUE Strength  Gross RUE Strength: WFL        ADL  LE Dressing: Dependent/Total (for adjusting slipper socks)          Transfers  Sit to stand: Contact guard assistance  Stand to sit: Contact guard assistance    Balance  Standing Balance: Contact guard assistance           Functional Mobility  Functional - Mobility Device: Rolling Walker  Activity: Other (3ft in room)  Assist Level: Contact guard assistance           Activity Tolerance:  Activity Tolerance: Patient limited by fatigue, Patient limited by pain    Treatment Initiated:  Pt ambulated additional 10ft with CGA using RW with extra time & education for walker safety. Therapist educated Pt on THR precautions & t/f technique. Assessment:  Assessment: Pt demo decreased balance & endurance (d/t pain) for ADLs & functional mobility at OF. Continued OT recommended to increase indep with ADLs & functional mobility as well as educate Pt on compensatory strategies with THR precautions. Performance deficits / Impairments: Decreased functional mobility , Decreased ADL status, Decreased endurance, Decreased balance  Prognosis: Fair  Discharge Recommendations: Home with assist PRN    Clinical Decision Making: Clinical Decision making was of Low Complexity as the result of analysis of data from a problem focused assessment, a consideration of a limited number of treatment options, no significant comorbidities affecting the plan of care and no modification or assistance required to complete the evaluation.     Patient Education:  Patient Education: OT role, POC, safety with mobility, THR precautions, walker safety  Barriers to Learning: none    Equipment Recommendations:  Equipment Needed:

## 2017-12-12 NOTE — PROGRESS NOTES
endurance  Assessment: Pt with weakness and pain in left hip from THR sx yesterday causing her to require assistence for safe mobility and skilled PT to improve strength and mobility for household ambulation  Prognosis: Excellent    Clinical Presentation: Moderate - Evolving with Changing Characteristics:  Pt with weakness and pain in left hip from [facility] sx yesterday causing her to require assistence for safe mobility and skilled PT to improve strength and mobility for household ambulation    Decision Making:  Moderate Complexitybased on patient assessment and decision making process of determining plan of care and establishing reasonable expectations for measurable functional outcomes    REQUIRES PT FOLLOW UP: Yes  Discharge Recommendations: Continue to assess pending progress, 24 hour supervision or assist    Patient Education:  Patient Education: POC,THR precautions    Equipment Recommendations:  Equipment Needed: No    Safety:  Type of devices: Gait belt, All fall risk precautions in place, Call light within reach, Nurse notified, Left in chair, Chair alarm in place  Restraints  Initially in place: No    Plan:  Times per week: BID 7 X O  Times per day: Twice a day  Specific instructions for Next Treatment: THR protocol, mobility, gait, car, steps   Current Treatment Recommendations: Strengthening, ROM, Transfer Training, Functional Mobility Training, Balance Training, Endurance Training, Gait Training, Stair training    Goals:  Patient goals : Go home with spouse   Short term goals  Time Frame for Short term goals: 2 weeks   Short term goal 1: supine to sit and return with SBA to get in and out of bed   Short term goal 2: sit to stand with SBA to get on and off various surfaces  Short term goal 3: ambulate with  feet with SBA to walk safely in the home  Short term goal 4: ascend/descend a platform step with RW and CGA and 3 standard steps with Min A and cane if available to enter home  Short term goal 5: car transfer with CGA to leave hospital for home  Long term goals  Time Frame for Long term goals : NA due to short ELOS    Evaluation Complexity: Based on the findings of patient history, examination, clinical presentation, and decision making during this evaluation, the evaluation of Maykel Phelps  is of medium complexity. PT G-Codes  Functional Limitation: Mobility: Walking and moving around  Mobility: Walking and Moving Around Current Status (): At least 40 percent but less than 60 percent impaired, limited or restricted  Mobility: Walking and Moving Around Goal Status ():  At least 20 percent but less than 40 percent impaired, limited or restricted       AM-PAC Inpatient Mobility without Stair Climbing Raw Score : 15  AM-PAC Inpatient without Stair Climbing T-Scale Score : 43.03  Mobility Inpatient CMS 0-100% Score: 47.43  Mobility Inpatient without Stair CMS G-Code Modifier : CK

## 2017-12-12 NOTE — CONSULTS
[address] [id number]                                   CONSULTATION    PATIENT NAME: Gerald Leal                     :        1950  MED REC NO:   [id number]                           ROOM:       0013  ACCOUNT NO:   [de-identified]                           ADMIT DATE: 2017  PROVIDER:     Vee Alvarado. Cheikh Ashley M.D.    Duarte Shack:  2017    HISTORY OF PRESENT ILLNESS:  This patient is a 50-year-old lady, who just  underwent hip replacement by  _____. Consultation was obtained for  postoperative evaluation from the cardiac standpoint. This patient is  known to have a history of nonobstructive coronary artery disease,  corrected by heart cath a few years ago. This patient had negative stress  test recently. In the postoperative period, she denied any chest pain. The patient denied palpitation. REVIEW OF SYSTEMS:  She had no history of CVA or TIA. She had a history of  back pain and history of hypertension. The patient had a history of  gastroesophageal reflux. She does have history of fibromyalgia. The  patient had a history of arthritis. She has no history of CVA. No history  of MI. She had no history of pulmonary disease. No history of GI bleed or  bleeding disorders. She has no history of claudication. She has no fever,  chills or rigors. No psychological disorder. SOCIAL HISTORY:  She denies smoking or alcohol abuse. ALLERGIES:  The patient is allergic to SULFA. CODE STATUS:  She is a full code. SOCIAL HISTORY:  Denies smoking or alcohol abuse. PHYSICAL EXAMINATION:  VITAL SIGNS:  Blood pressure is 117/80. She is in a sinus rhythm. She is  afebrile. Respiratory rate is 18. HEENT:  No discharge from the throat, ears or the nose. NEUROLOGIC:  The patient has no focal neurological deficit. NECK:  She has no JVD. No carotid bruit. There is no thyromegaly.   She  has no
[id number] consult dictated
5550.74 ml   Output             1175 ml   Net          4375.74 ml       General appearance - alert, well appearing, and in no distress  Neck - supple, no significant adenopathy  Chest - clear to auscultation, no wheezes, rales or rhonchi, symmetric air entry  Heart - normal rate, regular rhythm, normal S1, S2, no murmurs, rubs, clicks or gallops  Abdomen - soft, nontender, nondistended, no masses or organomegaly  Obese: No;   Neurological - alert, oriented, normal speech, no focal findings or movement disorder noted, neck supple without rigidity, cranial nerves II through XII intact, motor and sensory grossly normal bilaterally, normal muscle tone, no tremors, strength 5/5  Musculoskeletal - no joint tenderness, deformity or swelling  Extremities - peripheral pulses normal, no pedal edema, no clubbing or cyanosis  Skin - normal coloration and turgor, no rashes, no suspicious skin lesions noted    Review of Labs and Diagnostic Testing:    Lab Results   Component Value Date    WBC 9.6 12/12/2017    RBC 2.91 12/12/2017    RBC 15-20 07/07/2017    HGB 9.2 12/12/2017    HCT 27.6 12/12/2017    MCV 94.6 12/12/2017    MCH 31.5 12/12/2017    MCHC 33.3 12/12/2017    RDW 13.1 12/12/2017     12/12/2017    MPV 7.1 12/12/2017     Lab Results   Component Value Date     12/12/2017    K 3.7 12/12/2017     12/12/2017    CO2 24 12/12/2017    BUN 15 12/12/2017    CREATININE 0.5 12/12/2017    GLUCOSE 113 12/12/2017    CALCIUM 8.5 12/12/2017     Lab Results   Component Value Date    CALCIUM 8.5 12/12/2017     No results found for: IONCA  No results found for: MG  No results found for: PHOS  No results found for: BNP  Lab Results   Component Value Date    ALKPHOS 55 11/20/2017    ALT 15 11/20/2017    AST 21 11/20/2017    PROT 7.8 11/20/2017    BILITOT 0.5 11/20/2017    BILITOT NEGATIVE 07/21/2017    BILIDIR <0.2 11/20/2017    LABALBU 4.5 11/20/2017     No results found for: LACTA  No results found for:

## 2017-12-12 NOTE — CARE COORDINATION
12/12/17, 10:08 AM      Lannette Lefort       Admitted from:  12/11/2017/ [facility] day: 1   Location: [address] [place] [address]A Reason for admit: Primary osteoarthritis of left hip [M16.12] Status: inpatient  Admit order signed?: yes  PMH:  has a past medical history of Cervical radiculopathy; Degenerative arthritis of cervical spine; Degenerative lumbar disc; Fibromyalgia; GERD (gastroesophageal reflux disease); Hydronephrosis; Hypertension; Osteoarthritis; Spinal stenosis; UPJ (ureteropelvic junction) obstruction; Vitreous detachment of left eye; and Vitreous detachment of right eye. Procedure: 12/11/17 total left hip by Dr France Dears  Pertinent abnormal Imaging:no  Medications:  Scheduled Meds:   aspirin  81 mg Oral Daily    vitamin C  1,000 mg Oral Daily    calcium elemental  1,000 mg Oral BID    cyclobenzaprine  10 mg Oral Nightly    lisinopril  5 mg Oral Daily    multivitamin  1 tablet Oral QAM    pantoprazole  40 mg Oral QAM AC    sodium chloride flush  10 mL Intravenous 2 times per day    docusate sodium  100 mg Oral BID    enoxaparin  30 mg Subcutaneous Q12H    atenolol  50 mg Oral BID    And    chlorthalidone  25 mg Oral BID     Continuous Infusions:   sodium chloride 100 mL/hr at 12/11/17 1059    lactated ringers      sodium chloride 125 mL/hr at 12/11/17 2330      Pertinent Info/Orders/Treatment Plan:  PT/OT. Pain management/ N/V checks. Maintain THR precautions  Diet: DIET GENERAL;   DVT Prophylaxis: Lovenox  Smoking status:  reports that she has never smoked.  She has never used smokeless tobacco.   Influenza Vaccination Screening Completed: yes  Pneumonia Vaccination Screening Completed: yes  Core measures: SCIP core measures:  Surgery stop time:1514  BB within 24 hours of start time AND POD 1 or 2-yes  Atb last dose prior to 24 hours post-op (48 for CABG)-yes  Correct DVT prophylaxis within 24 hours post-op-yes  Levi removed or reordered with reason by:today  PCP: Ena Sanches

## 2017-12-12 NOTE — PROGRESS NOTES
Post/op Date :           Subjective:  Pt doing well. No complaints. Pain controlled. Moving ok.       Objective:      BP (!) 111/58   Pulse 82   Temp 98.6 °F (37 °C) (Oral)   Resp 16   Ht 5' 6\" (1.676 m)   Wt 171 lb 6.4 oz (77.7 kg)   SpO2 98%   BMI 27.66 kg/m²     CBC with Differential:    Lab Results   Component Value Date    WBC 9.6 12/12/2017    RBC 2.91 12/12/2017    RBC 15-20 07/07/2017    HGB 9.2 12/12/2017    HCT 27.6 12/12/2017     12/12/2017    MCV 94.6 12/12/2017    MCH 31.5 12/12/2017    MCHC 33.3 12/12/2017    RDW 13.1 12/12/2017    NRBC 0 12/12/2017    SEGSPCT 65.7 12/12/2017    LYMPHOPCT 28.1 09/17/2014    MONOPCT 8.4 12/12/2017    EOSPCT 2.3 09/17/2014    BASOPCT 0.6 09/17/2014    MONOSABS 0.8 12/12/2017    LYMPHSABS 2.2 12/12/2017    EOSABS 0.3 12/12/2017    BASOSABS 0.0 12/12/2017     BMP:    Lab Results   Component Value Date     12/12/2017    K 3.7 12/12/2017     12/12/2017    CO2 24 12/12/2017    BUN 15 12/12/2017    LABALBU 4.5 11/20/2017    CREATININE 0.5 12/12/2017    CALCIUM 8.5 12/12/2017    LABGLOM >90 12/12/2017    GLUCOSE 113 12/12/2017         neurocirc checks ok  Pedal push pull  Ok    24HR INTAKE/OUTPUT:    Intake/Output Summary (Last 24 hours) at 12/12/17 0703  Last data filed at 12/12/17 0440   Gross per 24 hour   Intake             4147 ml   Output              875 ml   Net             3272 ml             Assessment:      Patient Active Problem List   Diagnosis    Hypertension    Degenerative arthritis of cervical spine    Osteoarthritis    Chest pain syndrome    Heart palpitations    Primary osteoarthritis of left hip    Primary osteoarthritis of left hip         Plan:    1 start lovenox for 10 days then asa daily  2 start  PT/OT and as outpt  3 plan dc tomorrowm to home        Art Elliott MD  7:03 AM  12/12/2017

## 2017-12-12 NOTE — CARE COORDINATION
DISCHARGE BARRIERS  12/12/17, 2:19 PM    Reason for Referral:  Discharge needs   Mental Status:  Alert and oriented  Decision Making: makes own decisions   Family/Social/Home Environment:  Alexia Bird lives at home with her . She has been independent with her care prior to admission. She plans home with family, who can help with care and household tasks. Current Services: none   Current Equipment: has walker, elevated toilet seat  Payment Source: medicare   Concerns or Barriers to Discharge:  None   Collabrative List of ECF/HH were provided: declined     Teach Back Method used with patient regarding care plan and discharge plan  Patient verbalizes understanding of the plan of care and contributes to goal setting. Anticipated Needs/Discharge Plan:  Spoke with Alexia Bird about discharge needs . She plans home with her , plans outpt PT. Family will help with care and transportation.       Electronically signed by AIDAN Mason on 12/12/2017 at 2:19 PM

## 2017-12-12 NOTE — FLOWSHEET NOTE
During the initial contact with the patient (Elizabeth Duval) had prayer and offered emotional support.       12/12/17 0912   Encounter Summary   Services provided to: Patient   Referral/Consult From: Rounding   Continue Visiting Yes  (12/12/17)   Volunteer Visit Yes  (12/12/17)   Routine   Type Initial   Spiritual/Judaism   Type Spiritual support

## 2017-12-13 VITALS
WEIGHT: 171.4 LBS | RESPIRATION RATE: 16 BRPM | DIASTOLIC BLOOD PRESSURE: 57 MMHG | OXYGEN SATURATION: 93 % | HEART RATE: 92 BPM | BODY MASS INDEX: 27.55 KG/M2 | HEIGHT: 66 IN | TEMPERATURE: 98.6 F | SYSTOLIC BLOOD PRESSURE: 111 MMHG

## 2017-12-13 LAB
ANION GAP SERPL CALCULATED.3IONS-SCNC: 12 MEQ/L (ref 8–16)
BASOPHILS # BLD: 0.3 %
BASOPHILS ABSOLUTE: 0 THOU/MM3 (ref 0–0.1)
BUN BLDV-MCNC: 10 MG/DL (ref 7–22)
CALCIUM SERPL-MCNC: 9.3 MG/DL (ref 8.5–10.5)
CHLORIDE BLD-SCNC: 97 MEQ/L (ref 98–111)
CO2: 25 MEQ/L (ref 23–33)
CREAT SERPL-MCNC: 0.6 MG/DL (ref 0.4–1.2)
EOSINOPHIL # BLD: 4.5 %
EOSINOPHILS ABSOLUTE: 0.6 THOU/MM3 (ref 0–0.4)
GFR SERPL CREATININE-BSD FRML MDRD: > 90 ML/MIN/1.73M2
GLUCOSE BLD-MCNC: 131 MG/DL (ref 70–108)
HCT VFR BLD CALC: 28.1 % (ref 37–47)
HEMOGLOBIN: 9.5 GM/DL (ref 12–16)
LYMPHOCYTES # BLD: 20.3 %
LYMPHOCYTES ABSOLUTE: 2.6 THOU/MM3 (ref 1–4.8)
MCH RBC QN AUTO: 31.2 PG (ref 27–31)
MCHC RBC AUTO-ENTMCNC: 33.7 GM/DL (ref 33–37)
MCV RBC AUTO: 92.7 FL (ref 81–99)
MONOCYTES # BLD: 8.4 %
MONOCYTES ABSOLUTE: 1.1 THOU/MM3 (ref 0.4–1.3)
NUCLEATED RED BLOOD CELLS: 0 /100 WBC
PDW BLD-RTO: 13.4 % (ref 11.5–14.5)
PLATELET # BLD: 186 THOU/MM3 (ref 130–400)
PMV BLD AUTO: 7.2 MCM (ref 7.4–10.4)
POTASSIUM SERPL-SCNC: 3.3 MEQ/L (ref 3.5–5.2)
RBC # BLD: 3.03 MILL/MM3 (ref 4.2–5.4)
SEG NEUTROPHILS: 66.5 %
SEGMENTED NEUTROPHILS ABSOLUTE COUNT: 8.4 THOU/MM3 (ref 1.8–7.7)
SODIUM BLD-SCNC: 134 MEQ/L (ref 135–145)
WBC # BLD: 12.6 THOU/MM3 (ref 4.8–10.8)

## 2017-12-13 PROCEDURE — 97116 GAIT TRAINING THERAPY: CPT

## 2017-12-13 PROCEDURE — 80048 BASIC METABOLIC PNL TOTAL CA: CPT

## 2017-12-13 PROCEDURE — 36415 COLL VENOUS BLD VENIPUNCTURE: CPT

## 2017-12-13 PROCEDURE — 97530 THERAPEUTIC ACTIVITIES: CPT

## 2017-12-13 PROCEDURE — 85025 COMPLETE CBC W/AUTO DIFF WBC: CPT

## 2017-12-13 PROCEDURE — 6370000000 HC RX 637 (ALT 250 FOR IP): Performed by: ORTHOPAEDIC SURGERY

## 2017-12-13 PROCEDURE — 97110 THERAPEUTIC EXERCISES: CPT

## 2017-12-13 PROCEDURE — 6360000002 HC RX W HCPCS: Performed by: ORTHOPAEDIC SURGERY

## 2017-12-13 PROCEDURE — 2580000003 HC RX 258: Performed by: ORTHOPAEDIC SURGERY

## 2017-12-13 PROCEDURE — 6370000000 HC RX 637 (ALT 250 FOR IP): Performed by: INTERNAL MEDICINE

## 2017-12-13 RX ORDER — POTASSIUM CHLORIDE 20 MEQ/1
40 TABLET, EXTENDED RELEASE ORAL ONCE
Status: COMPLETED | OUTPATIENT
Start: 2017-12-13 | End: 2017-12-13

## 2017-12-13 RX ORDER — OXYCODONE HYDROCHLORIDE AND ACETAMINOPHEN 5; 325 MG/1; MG/1
1 TABLET ORAL EVERY 4 HOURS PRN
Qty: 60 TABLET | Refills: 0
Start: 2017-12-13 | End: 2017-12-20

## 2017-12-13 RX ADMIN — ASPIRIN 81 MG: 81 TABLET, CHEWABLE ORAL at 08:27

## 2017-12-13 RX ADMIN — LISINOPRIL 5 MG: 5 TABLET ORAL at 08:26

## 2017-12-13 RX ADMIN — CALCIUM 1000 MG: 500 TABLET ORAL at 08:27

## 2017-12-13 RX ADMIN — ENOXAPARIN SODIUM 30 MG: 30 INJECTION SUBCUTANEOUS at 08:27

## 2017-12-13 RX ADMIN — OXYCODONE HYDROCHLORIDE AND ACETAMINOPHEN 2 TABLET: 5; 325 TABLET ORAL at 09:57

## 2017-12-13 RX ADMIN — DOCUSATE SODIUM 100 MG: 100 CAPSULE ORAL at 08:26

## 2017-12-13 RX ADMIN — POTASSIUM CHLORIDE 40 MEQ: 1500 TABLET, EXTENDED RELEASE ORAL at 09:56

## 2017-12-13 RX ADMIN — CHLORTHALIDONE 25 MG: 25 TABLET ORAL at 08:27

## 2017-12-13 RX ADMIN — PANTOPRAZOLE SODIUM 40 MG: 40 TABLET, DELAYED RELEASE ORAL at 08:26

## 2017-12-13 RX ADMIN — OXYCODONE HYDROCHLORIDE AND ACETAMINOPHEN 2 TABLET: 5; 325 TABLET ORAL at 04:54

## 2017-12-13 RX ADMIN — Medication 10 ML: at 08:30

## 2017-12-13 RX ADMIN — OXYCODONE HYDROCHLORIDE AND ACETAMINOPHEN 2 TABLET: 5; 325 TABLET ORAL at 14:10

## 2017-12-13 RX ADMIN — Medication 1000 MG: at 08:27

## 2017-12-13 ASSESSMENT — PAIN DESCRIPTION - ORIENTATION
ORIENTATION: LEFT
ORIENTATION: LEFT

## 2017-12-13 ASSESSMENT — PAIN DESCRIPTION - LOCATION
LOCATION: HIP
LOCATION: HIP

## 2017-12-13 ASSESSMENT — PAIN SCALES - GENERAL
PAINLEVEL_OUTOF10: 4
PAINLEVEL_OUTOF10: 4
PAINLEVEL_OUTOF10: 2
PAINLEVEL_OUTOF10: 0
PAINLEVEL_OUTOF10: 2
PAINLEVEL_OUTOF10: 4
PAINLEVEL_OUTOF10: 2

## 2017-12-13 ASSESSMENT — PAIN DESCRIPTION - PAIN TYPE
TYPE: ACUTE PAIN;SURGICAL PAIN
TYPE: ACUTE PAIN;SURGICAL PAIN

## 2017-12-13 NOTE — PROGRESS NOTES
Post/op Date :           Subjective:  Pt doing well. . Pain controlled. Moving ok when up sore during transition.       Objective:      /65   Pulse 88   Temp 99.5 °F (37.5 °C) (Oral)   Resp 18   Ht 5' 6\" (1.676 m)   Wt 171 lb 6.4 oz (77.7 kg)   SpO2 94%   BMI 27.66 kg/m²     CBC with Differential:    Lab Results   Component Value Date    WBC 9.6 12/12/2017    RBC 2.91 12/12/2017    RBC 15-20 07/07/2017    HGB 9.2 12/12/2017    HCT 27.6 12/12/2017     12/12/2017    MCV 94.6 12/12/2017    MCH 31.5 12/12/2017    MCHC 33.3 12/12/2017    RDW 13.1 12/12/2017    NRBC 0 12/12/2017    SEGSPCT 65.7 12/12/2017    LYMPHOPCT 28.1 09/17/2014    MONOPCT 8.4 12/12/2017    EOSPCT 2.3 09/17/2014    BASOPCT 0.6 09/17/2014    MONOSABS 0.8 12/12/2017    LYMPHSABS 2.2 12/12/2017    EOSABS 0.3 12/12/2017    BASOSABS 0.0 12/12/2017     BMP:    Lab Results   Component Value Date     12/12/2017    K 3.7 12/12/2017     12/12/2017    CO2 24 12/12/2017    BUN 15 12/12/2017    LABALBU 4.5 11/20/2017    CREATININE 0.5 12/12/2017    CALCIUM 8.5 12/12/2017    LABGLOM >90 12/12/2017    GLUCOSE 113 12/12/2017         neurocirc checks ok  Pedal push pull  Ok    24HR INTAKE/OUTPUT:    Intake/Output Summary (Last 24 hours) at 12/13/17 2979  Last data filed at 12/13/17 0454   Gross per 24 hour   Intake          3233.74 ml   Output             3400 ml   Net          -166.26 ml             Assessment:      Patient Active Problem List   Diagnosis    Hypertension    Degenerative arthritis of cervical spine    Osteoarthritis    Chest pain syndrome    Heart palpitations    Primary osteoarthritis of left hip    Primary osteoarthritis of left hip         Plan:    1 continue lovenox through next thurs then asa  2 /continue  PT/OT as outpt plan dc today   rto 2 wks          Heber Trinidad MD  6:33 AM  12/13/2017

## 2017-12-13 NOTE — FLOWSHEET NOTE
12/12/17 1927   Provider Notification   Reason for Communication Evaluate; Review case   Provider Name Reymundo Pastor MD   Provider Notification Physician   Method of Communication Face to face   Response At bedside   Notification Time 1925     MD Made aware of pt. /62 HR 86 with Atenolol due. MD states to give half dose of Atenolol from what is ordered. Pt. Made aware as MD is at bedside.

## 2017-12-13 NOTE — PROGRESS NOTES
15  10   CREATININE  0.5  0.6     BNP: No results for input(s): BNP in the last 72 hours. PT/INR: No results for input(s): PROTIME, INR in the last 72 hours. APTT: No results for input(s): APTT in the last 72 hours. CARDIAC ENZYMES: No results for input(s): CKMB, CKMBINDEX, TROPONINT in the last 72 hours. Invalid input(s): CKTOTAL;3      Assessment/Plan:  Active Problems:    Primary osteoarthritis of left hip    Primary osteoarthritis of left hip      Active Issues  - Post op status  - leucocytosis  - hyponatremia and hypokalemia    PLAN  1. Continue post-op care per primary service  2. Replace electrolytes per protocol  3.  Stop IVF and consider reducing dose of Chlorthalidone to 12.5 mg daily    Electronically signed by Shoaib Vo MD on 12/13/2017 at 1:06 PM    Attending Physician

## 2017-12-13 NOTE — PROGRESS NOTES
[facility]    Time In: 4052  Time Out: 8544  Timed Code Treatment Minutes: 47 Minutes  Minutes: 54          Date: 2017  Patient Name: Radha Holm,  Gender:  female        MRN: [id number]  : 1950  (75 y.o.)     Referring Practitioner: Dr Jazmine Acosta  Diagnosis: Primary Osteoarthritis of left hip  Additional Pertinent Hx: Pt admitted  for THR left. Pt reports having had back sx in May.       Past Medical History:   Diagnosis Date    Cervical radiculopathy     Degenerative arthritis of cervical spine     Degenerative lumbar disc     Fibromyalgia     GERD (gastroesophageal reflux disease)     Hydronephrosis 2016    right kidney    Hypertension     Osteoarthritis     neck,hands    Spinal stenosis     UPJ (ureteropelvic junction) obstruction 2002    Vitreous detachment of left eye     Vitreous detachment of right eye      Past Surgical History:   Procedure Laterality Date    BACK SURGERY  2017    L2-5 Decompression L2-5 posterior fusion and tlif by Dr David Castrejon  2004    biopsy, Dr. Valdo Phillips  [facility]  2015    CARDIOVASCULAR STRESS TEST  2014    CATARACT REMOVAL WITH IMPLANT Left 2016    CATARACT REMOVAL WITH IMPLANT Right 2016    CERVICAL [facility] SURGERY  2008    C5-6 discectomy, Dr. Nusrat Garcia -[facility]  10/02 10/05 11/10   Remonia Box    Dr. Bess Houston, [facility]    HIP ARTHROSCOPY Right 2014    labrum repair and PSCAS lengthening - Dr. Fide Marcelo in [facility]  2001    Dr. Kay Lee - [place] Right 2014    TOTAL HIP, [facility]. Carito Roberts [facility]    LUMBAR [facility] [place] SURGERY  2000    L3-5 [place] [address]  2001    L3-4 with cage, Dr. Nadeem Macedo  2016    radiofrequency ablation assistance (To WC and EOB.)  Car Transfer: Minimal Assistance (Min A to elevate L LE and to stand from car seat. Pt. compliant with technique and hip precautions throughout transfer)       Ambulation 1  Surface: level tile  Device: Rolling Walker  Assistance: Contact guard assistance  Quality of Gait: Slow velocity and donte. Short step length. Step to pattern. Decreased L LE stance time. Slightly forward flexed posture. Pt. slightly unsteady but no LOB. Distance: 25' x 2, 10' x 2    Stairs  Rails: Left ascending  Curbs: 6\"  Device: Hand Held Assist  Assistance: Minimal assistance  Comment: Pt. performed platform step with RW 2x. VCs for technique with good compliance. Pt. also ascended 2 steps with L hand rail and HHA on R side with min A required to complete. Pt. slightly unsteady but with no LOB. Extra time to complete. Exercises:  Exercises  Comments: Pt. performed seated BLE ther ex 10x each consisting of ankle pumps, glute/quad sets, long arc quads, marches to 90 degrees all to increase strength and improve functional mobility. Activity Tolerance:  Activity Tolerance: Patient limited by pain; Patient limited by endurance    Assessment: Body structures, Functions, Activity limitations: Decreased safe awareness, Decreased balance, Decreased functional mobility , Decreased ROM, Decreased strength, Decreased endurance  Assessment: Pt. tolerated session well. Pt. slightly unsteady on feet but with no LOB. Pt. fatigues easily and requests to lay in bed at end of session. Pt. would benefit from continued skilled PT to increase strength and improve functional mobility. Prognosis: Good  REQUIRES PT FOLLOW UP: Yes  Discharge Recommendations: Continue to assess pending progress, 24 hour supervision or assist    Patient Education:  Patient Education: POC, Ther ex, car transfer, Stairs, THR precautions. Equipment Recommendations:  Equipment Needed: No    Safety:  Type of devices:  All fall

## 2017-12-13 NOTE — CARE COORDINATION
12/13/17, 9:19 AM    Discharge plan discussed by  and . Discharge plan reviewed with patient/ family. Patient/ family verbalize understanding of discharge plan and are in agreement with plan. Understanding was demonstrated using the teach back method.    Services After Discharge  Services At/After Discharge: PT   IMM Letter  IMM Letter given to Patient/Family/Significant other/Guardian/POA/by[de-identified] staff  IMM Letter date given[de-identified] 12/11/17  IMM Letter time given[de-identified] [facility] home with  and OPPT

## 2017-12-13 NOTE — PROGRESS NOTES
Admit Date: 12/11/2017  Hospital day 3    Subjective:     Patient none. .   Medication side effects: none    Scheduled Meds:   aspirin  81 mg Oral Daily    vitamin C  1,000 mg Oral Daily    calcium elemental  1,000 mg Oral BID    cyclobenzaprine  10 mg Oral Nightly    lisinopril  5 mg Oral Daily    multivitamin  1 tablet Oral QAM    pantoprazole  40 mg Oral QAM AC    sodium chloride flush  10 mL Intravenous 2 times per day    docusate sodium  100 mg Oral BID    enoxaparin  30 mg Subcutaneous Q12H    atenolol  50 mg Oral BID    And    chlorthalidone  25 mg Oral BID     Continuous Infusions:   sodium chloride 100 mL/hr at 12/11/17 1059     PRN Meds:sodium chloride flush, acetaminophen, ondansetron, oxyCODONE-acetaminophen, oxyCODONE-acetaminophen    Review of Systems  Pertinent items are noted in HPI. Objective:     Patient Vitals for the past 8 hrs:   BP Temp Temp src Pulse Resp SpO2   12/13/17 0454 118/65 99.5 °F (37.5 °C) Oral 88 18 94 %   12/12/17 2344 (!) 101/57 98.9 °F (37.2 °C) Oral 87 18 92 %     I/O last 3 completed shifts: In: 4030.7 [P.O.:2600; I.V.:1430.7]  Out: 2500 [Urine:2500]      ECG: normal sinus rhythm, no blocks or conduction defects, no ischemic changes.    Data Review  CBC:   Lab Results   Component Value Date    WBC 9.6 12/12/2017    RBC 2.91 12/12/2017    RBC 15-20 07/07/2017     BMP:   Lab Results   Component Value Date    GLUCOSE 113 12/12/2017    CO2 24 12/12/2017    BUN 15 12/12/2017    CREATININE 0.5 12/12/2017    CALCIUM 8.5 12/12/2017     Coagulation:   Lab Results   Component Value Date    INR 1.01 11/20/2017    APTT 39.3 04/25/2017     Cardiac markers:   Lab Results   Component Value Date    TROPONINT < 0.010 02/07/2015     ABG: No results found for: QMR9RII, BEART, A8HXUCBY, PHART, THGBART, SRW5JOW, PO2ART, SXE8ISR    Assessment:     Active Problems:    Primary osteoarthritis of left hip    Primary osteoarthritis of left hip      Plan:   PATIENT CONTINUE TO BE STABLE NO CHEST PAIN    BP IS UNDER CONTROL    MAY NEED TO REDUCE DOSE OF HCTZ    ANAEMIA    MAY BENEFIT FROM IRON SUPPLEMENT    STABLE CARDIAC WISE

## 2017-12-14 ENCOUNTER — CARE COORDINATION (OUTPATIENT)
Dept: CASE MANAGEMENT | Age: 67
End: 2017-12-14

## 2017-12-14 NOTE — CARE COORDINATION
[facility] Transitions Initial Follow Up Call    Call within 2 business days of discharge: Yes    Patient: Durga Kwong Patient : 1950   MRN: [id number]  Reason for Admission: There are no discharge diagnoses documented for the most recent discharge. Discharge Date: 17 RARS: [facility] Risk Score: 19.5     Spoke with: [facility] [employer]: [facility]    Non-face-to-face services provided:  Obtained and reviewed discharge summary and/or continuity of care documents      Care Transitions 24 Hour Call    Do you have any ongoing symptoms?:  No  Do you have a copy of your discharge instructions?:  Yes  Do you have all of your prescriptions and are they filled?:  Yes  Have you been contacted by a [address]?:  No  Have you scheduled your follow up appointment?:  Yes  How are you going to get to your appointment?:  Car - family or friend to transport  Were you discharged with any Home Care or Post Acute Services:  No  Do you feel like you have everything you need to keep you well at home?:  Yes  Care Transitions Interventions  No Identified Needs     Called pt for the care transition initial follow up call, explained the role of the care coordinator. Pt was admitted for a left hip replacement on 17. Pt stated she is doing well post op. Pt stated the incision is healing well, is covered with a dressing. Pt reported the pain 3/10, with Percocet & ES tylenol. Pt is wearing YUE hose & ambulating with a walker. Pt will start OP PT @ [facility] & is doing her exercises at home. Pt reminded to cough & deep breath, she is using the spirometer. Pt denied any needs or concerns. CTC will continue to follow.     Follow Up  Future Appointments  Date Time Provider Angelique Llamas   2017 9:10 AM Glenn Jason MD [facility] [place]   2018 8:10 AM Darwin Winters MD [facility] RN  Care Transition Coordinator  [phone]

## 2017-12-18 ENCOUNTER — CARE COORDINATION (OUTPATIENT)
Dept: CASE MANAGEMENT | Age: 67
End: 2017-12-18

## 2017-12-19 ENCOUNTER — TELEPHONE (OUTPATIENT)
Dept: FAMILY MEDICINE CLINIC | Age: 67
End: 2017-12-19

## 2017-12-19 ENCOUNTER — OFFICE VISIT (OUTPATIENT)
Dept: FAMILY MEDICINE CLINIC | Age: 67
End: 2017-12-19

## 2017-12-19 VITALS
HEART RATE: 72 BPM | SYSTOLIC BLOOD PRESSURE: 136 MMHG | DIASTOLIC BLOOD PRESSURE: 74 MMHG | RESPIRATION RATE: 14 BRPM | HEIGHT: 66 IN | BODY MASS INDEX: 27.68 KG/M2 | WEIGHT: 172.25 LBS

## 2017-12-19 DIAGNOSIS — M16.12 PRIMARY OSTEOARTHRITIS OF LEFT HIP: Primary | ICD-10-CM

## 2017-12-19 DIAGNOSIS — I10 ESSENTIAL HYPERTENSION: ICD-10-CM

## 2017-12-19 DIAGNOSIS — Z96.642 HISTORY OF TOTAL HIP ARTHROPLASTY, LEFT: ICD-10-CM

## 2017-12-19 PROCEDURE — 99496 TRANSJ CARE MGMT HIGH F2F 7D: CPT | Performed by: EMERGENCY MEDICINE

## 2017-12-19 RX ORDER — CEFADROXIL 500 MG/1
500 CAPSULE ORAL 2 TIMES DAILY
Qty: 20 CAPSULE | Refills: 0 | Status: SHIPPED | OUTPATIENT
Start: 2017-12-19 | End: 2017-12-29

## 2017-12-19 ASSESSMENT — ENCOUNTER SYMPTOMS
SHORTNESS OF BREATH: 0
VOMITING: 0
VOICE CHANGE: 0
SINUS PRESSURE: 0
SORE THROAT: 0
RHINORRHEA: 0
ABDOMINAL PAIN: 0
COUGH: 0
CHEST TIGHTNESS: 0
WHEEZING: 0
TROUBLE SWALLOWING: 0
BACK PAIN: 0
NAUSEA: 0
DIARRHEA: 0
CONSTIPATION: 0

## 2017-12-19 NOTE — PROGRESS NOTES
any fever, no chills, no shortness of breath    Remained ambulatory      has a current medication list which includes the following prescription(s): cefadroxil, oxycodone-acetaminophen, aspirin, enoxaparin, NONFORMULARY, atenolol-chlorthalidone, lisinopril, multiple vitamin, calcium citrate, omeprazole, vitamin c, and cyclobenzaprine.     Allergies   Allergen Reactions    Minocycline Swelling    Sulfa Antibiotics Swelling    Bactrim Diarrhea    Erythromycin Diarrhea    Nortriptyline Itching    Vicodin [Hydrocodone-Acetaminophen] Other (See Comments)     Headache.  severe       Social History   Substance Use Topics    Smoking status: Never Smoker    Smokeless tobacco: Never Used    Alcohol use No       Past Medical History:   Diagnosis Date    Cervical radiculopathy     Degenerative arthritis of cervical spine     Degenerative lumbar disc     Fibromyalgia     GERD (gastroesophageal reflux disease)     Hydronephrosis 02/11/2016    right kidney    Hypertension     Osteoarthritis     neck,hands    Spinal stenosis     UPJ (ureteropelvic junction) obstruction 06/22/2002    Vitreous detachment of left eye 1996    Vitreous detachment of right eye 2005       Past Surgical History:   Procedure Laterality Date    BACK SURGERY  05/19/2017    L2-5 Decompression L2-5 posterior fusion and tlif by Dr Rickie Michaels  08/30/2004    biopsy, Dr. Donavon Chapa [facility]  02/07/2015    CARDIOVASCULAR STRESS TEST  2014    CATARACT REMOVAL WITH IMPLANT Left 04/25/2016    CATARACT REMOVAL WITH IMPLANT Right 05/09/2016    CERVICAL One [address] [facility] SURGERY  01/18/2008    C5-6 disce[facility], [facility]ctor [facility]  10/02 10/05 11/10   C[facility] Mock, [facility]    HIP ARTHROSCOPY Right 07/17/2014    labrum repair and PSCAS lengthening - Dr. Tomas Wilson in [facility]  08/18/2001    [facility] Blair Mckeon - [facility] Right 12/16/2014    TOTAL HIP, constipation, diarrhea, nausea and vomiting. Musculoskeletal: Negative for arthralgias, back pain, joint swelling, myalgias, neck pain and neck stiffness. Left hip wound with drainage   Neurological: Negative for dizziness, syncope, weakness, light-headedness, numbness and headaches. Exam:   Physical Exam   Constitutional: She is oriented to person, place, and time. She appears well-developed and well-nourished. She is cooperative. HENT:   Head: Normocephalic and atraumatic. Right Ear: External ear normal.   Left Ear: External ear normal.   Nose: Nose normal.   Mouth/Throat: Oropharynx is clear and moist.   Eyes: Conjunctivae and EOM are normal. Pupils are equal, round, and reactive to light. No scleral icterus. Neck: Normal range of motion. Neck supple. No JVD present. No thyromegaly present. Cardiovascular: Normal rate, regular rhythm and intact distal pulses. Exam reveals no friction rub. No murmur heard. Pulmonary/Chest: Effort normal and breath sounds normal. She has no wheezes. She has no rales. She exhibits no tenderness. Abdominal: Soft. Bowel sounds are normal. She exhibits no mass. There is no tenderness. Musculoskeletal: She exhibits no edema. Legs:  Lymphadenopathy:     She has no cervical adenopathy. Neurological: She is alert and oriented to person, place, and time. Skin: No rash noted. Vitals reviewed. Assessment:     1. Primary osteoarthritis of left hip     2. History of total hip arthroplasty, left     3. Essential hypertension         Plan:     Diagnostic Tests performed in hospital: Laboratories  Requested/reviewed: Yes    Disease Education:  Wound infection osteoarthritis hypertension    Home Health Care :  None    Discussed with other providers: None     Orders Placed:  No orders of the defined types were placed in this encounter.     Medications Prescribed:  Orders Placed This Encounter   Medications    cefadroxil (DURICEF) 500 MG capsule

## 2017-12-19 NOTE — TELEPHONE ENCOUNTER
----- Message from Sofia Venegas MD sent at 12/18/2017  4:17 PM EST -----  Is ordering Dr following her results?

## 2017-12-19 NOTE — DISCHARGE SUMMARY
[address] [id number]                                 DISCHARGE SUMMARY    PATIENT NAME: Yossi Riggs                     :        1950  MED REC NO:   [id number]                           ROOM:       0013  ACCOUNT NO:   [de-identified]                           ADMIT DATE: 2017  PROVIDER:     Phoebe Barron     [facility] DATE: 2017      PRINCIPAL DIAGNOSIS:  Osteoarthritis of the left hip. SECONDARY DIAGNOSES:  Hypertension, GERD, MSSA positive. PROCEDURE:  Left total hip arthroplasty. SURGEON:  Marizol Rhoades M.D. CONDITION ON DISCHARGE:  Improved. DISPOSITION:  Home. Follow up with Dr. Kristen Lanza in the office in 2 weeks for removal of  wound closure,  x-ray, and postoperative evaluation. HOSPITAL COURSE:  History is well documented in the patient's prior chart. The patient is a 68-year-old white female with a known bone-on-bone  osteoarthritis of the left hip. All conservative measures failed to  alleviate the patient's left hip pain and Dr. Kristen Lanza recommended the  left total hip arthroplasty. The patient had the above-stated procedure on  the day of admission after receiving preoperative clearance. Postoperatively, the patient did well. Neurovascular status was always  intact. Preoperative pain was gone. She was started on Lovenox for DVT  prophylaxis the day after surgery. Physical therapy was started on day  after surgery. Incision was healing well on postop day #2. LABORATORIES:  On postop day #1, white blood cell count was 9.6. On postop  day #2, it was 12.6. Hemoglobin on postop day #2 was 9.2, on postop day #2  was 9.5. Hematocrit was 27.6 on postop day #1, on postop day #2 it was  28.1. All other labs were within normal limits. She was afebrile  throughout her stay.     DISCHARGE INSTRUCTIONS:  Follow up with Dr. Kristen Lanza in 2 weeks.  Ice  to the left hip whenever possible. Push range of motion with physical  therapy. Keep the wound clean and dry and paint with Betadine and cover  with a dry sterile dressing on a daily basis. She was discharged with  Lovenox, Percocet, and Vistaril for pain control. Use a walker for  ambulation. Weightbearing as tolerated on the left lower extremity. Daily  neurovascular checks. On postop day #4, she may shower assuming there is  no drainage from the incision. JOHN Archuleta     D: 12/19/2017 14:22:32       T: 12/19/2017 14:25:06     [email or web address]  Job#: [id number]     Doc#: [id number]    CC:

## 2017-12-21 ENCOUNTER — CARE COORDINATION (OUTPATIENT)
Dept: CASE MANAGEMENT | Age: 67
End: 2017-12-21

## 2017-12-27 ENCOUNTER — CARE COORDINATION (OUTPATIENT)
Dept: CASE MANAGEMENT | Age: 67
End: 2017-12-27

## 2017-12-27 NOTE — CARE COORDINATION
[facility] Transitions Follow Up Call    2017    Patient: Monika Vigil  Patient : 1950   MRN: [id number]  Reason for Admission: There are no discharge diagnoses documented for the most recent discharge. Discharge Date: 17 RARS: Risk Score: 19.5       Spoke with: [address] Transitions Subsequent and Final Call    Subsequent and Final Calls  Do you have any ongoing symptoms?:  No  Have your medications changed?:  No  Do you have any questions related to your medications?:  No  Do you currently have any active services?:  No  Do you have any needs or concerns that I can assist you with?:  No  Identified Barriers:  None  Care Transitions Interventions  No Identified Needs  Other Interventions:        Called pt for the final transition of care call. Pt stated the dressing was changed this AM at her first OP PT appt. Pt stated it was clean & dry, no drainage was noted. Pt has appt with Dr Autumn Morales on 1/3/18 & thinks the \"zip ties\" will be removed at that time. Pt is controlled with ES Tylenol. Pt is ambulating with a cane in the house & walker when she goes out. Pt denied any needs or concerns. Informed this is the final transition of care call, pt to call PCP with any concerns.     Follow Up  Future Appointments  Date Time Provider Angelique Llamas   2018 8:10 AM Amos Machuca  Joselyn Sifuentes RN  Care Transition Coordinator  [phone]

## 2017-12-28 RX ORDER — LISINOPRIL 5 MG/1
TABLET ORAL
Qty: 90 TABLET | Refills: 1 | Status: SHIPPED | OUTPATIENT
Start: 2017-12-28 | End: 2018-02-28 | Stop reason: SDUPTHER

## 2018-02-28 ENCOUNTER — OFFICE VISIT (OUTPATIENT)
Dept: FAMILY MEDICINE CLINIC | Age: 68
End: 2018-02-28

## 2018-02-28 VITALS
WEIGHT: 175.38 LBS | BODY MASS INDEX: 28.19 KG/M2 | HEART RATE: 80 BPM | DIASTOLIC BLOOD PRESSURE: 80 MMHG | RESPIRATION RATE: 16 BRPM | SYSTOLIC BLOOD PRESSURE: 132 MMHG | HEIGHT: 66 IN

## 2018-02-28 DIAGNOSIS — K21.9 GASTROESOPHAGEAL REFLUX DISEASE, ESOPHAGITIS PRESENCE NOT SPECIFIED: ICD-10-CM

## 2018-02-28 DIAGNOSIS — I10 ESSENTIAL HYPERTENSION: Primary | ICD-10-CM

## 2018-02-28 DIAGNOSIS — D64.9 ANEMIA, UNSPECIFIED TYPE: ICD-10-CM

## 2018-02-28 PROCEDURE — 99213 OFFICE O/P EST LOW 20 MIN: CPT | Performed by: FAMILY MEDICINE

## 2018-02-28 RX ORDER — ATENOLOL AND CHLORTHALIDONE TABLET 50; 25 MG/1; MG/1
1 TABLET ORAL 2 TIMES DAILY
Qty: 180 TABLET | Refills: 1 | Status: SHIPPED | OUTPATIENT
Start: 2018-02-28 | End: 2018-07-18 | Stop reason: SDUPTHER

## 2018-02-28 RX ORDER — LISINOPRIL 5 MG/1
TABLET ORAL
Qty: 90 TABLET | Refills: 1 | Status: SHIPPED | OUTPATIENT
Start: 2018-02-28 | End: 2018-07-18 | Stop reason: SDUPTHER

## 2018-02-28 ASSESSMENT — ENCOUNTER SYMPTOMS
VOMITING: 0
COUGH: 0
NAUSEA: 0
CONSTIPATION: 0
ABDOMINAL PAIN: 0
SHORTNESS OF BREATH: 0
CHEST TIGHTNESS: 0
EYES NEGATIVE: 1
DIARRHEA: 0

## 2018-02-28 NOTE — PROGRESS NOTES
Visit Date: 2/28/2018    Caden Banks is a 75 y.o. female who presents today for:  Chief Complaint   Patient presents with    Hypertension stable       HPI:     HPI    has a current medication list which includes the following prescription(s): lisinopril, atenolol-chlorthalidone, aspirin, NONFORMULARY, multiple vitamin, calcium citrate, omeprazole, vitamin c, and cyclobenzaprine.     Allergies   Allergen Reactions    Minocycline Swelling    Sulfa Antibiotics Swelling    Bactrim Diarrhea    Erythromycin Diarrhea    Nortriptyline Itching    Vicodin [Hydrocodone-Acetaminophen] Other (See Comments)     Headache.  severe       Social History   Substance Use Topics    Smoking status: Never Smoker    Smokeless tobacco: Never Used    Alcohol use No       Past Medical History:   Diagnosis Date    Cervical radiculopathy     Degenerative arthritis of cervical spine     Degenerative lumbar disc     Fibromyalgia     GERD (gastroesophageal reflux disease)     Hydronephrosis 02/11/2016    right kidney    Hypertension     Osteoarthritis     neck,hands    Spinal stenosis     UPJ (ureteropelvic junction) obstruction 06/22/2002    Vitreous detachment of left eye 1996    Vitreous detachment of right eye 2005       Past Surgical History:   Procedure Laterality Date    BACK SURGERY  05/19/2017    L2-5 Decompression L2-5 posterior fusion and tlif by Dr Jos Christine  08/30/2004    biopsy, Dr. Kassy Collins  02/07/2015    CARDIOVASCULAR STRESS TEST  2014    CATARACT REMOVAL WITH IMPLANT Left 04/25/2016    CATARACT REMOVAL WITH IMPLANT Right 05/09/2016    CERVICAL One [address] [facility] SURGERY  01/18/2008    C5-6 discectomy, Dr. Pacheco Clarke  10/02 10/05 11/10   Wilfrid Sanchez, [facility]    HIP ARTHROSCOPY Right 07/17/2014    labrum repair and PSCAS lengthening - [facility]. Panfilo Hicks in [facility]  08/18/2001    Dr. Stacy West - [address] [facility] tablet 1     Sig: Take 1 tablet by mouth 2 times daily     Current Outpatient Prescriptions   Medication Sig Dispense Refill    lisinopril (PRINIVIL;ZESTRIL) 5 MG tablet TAKE 1 TABLET DAILY 90 tablet 1    atenolol-chlorthalidone (TENORETIC) 50-25 MG per tablet Take 1 tablet by mouth 2 times daily 180 tablet 1    aspirin 81 MG tablet Take 1 tablet by mouth daily 30 tablet 3    NONFORMULARY Take 2 tablets by mouth daily Eye promise      Multiple Vitamin TABS Take by mouth every morning      CALCIUM CITRATE PO Take 1,260 mg by mouth 2 times daily       omeprazole (PRILOSEC) 20 MG capsule Take 1 capsule by mouth daily as needed   2    Ascorbic Acid (VITAMIN C) 1000 MG tablet Take 1,000 mg by mouth daily       cyclobenzaprine (FLEXERIL) 10 MG tablet Take 10 mg by mouth nightly. No current facility-administered medications for this visit. Orders Placed This Encounter   Procedures    CBC with Differential     Standing Status:   Future     Standing Expiration Date:   2/28/2019    Basic Metabolic Panel     Standing Status:   Future     Standing Expiration Date:   2/28/2019     Continue current medications. Return in about 4 months (around 6/28/2018) for HTN. Discussed use, benefit, and side effects of prescribed medications. All patient questions answered. Pt voiced understanding. Instructed to continue current medications, diet and exercise. Patient agreed with treatment plan.

## 2018-03-08 ENCOUNTER — HOSPITAL ENCOUNTER (OUTPATIENT)
Dept: INTERVENTIONAL RADIOLOGY/VASCULAR | Age: 68
Discharge: HOME | End: 2018-03-08
Payer: MEDICARE

## 2018-03-08 DIAGNOSIS — M53.3 CHRONIC SI JOINT PAIN: ICD-10-CM

## 2018-03-08 DIAGNOSIS — G89.29 CHRONIC SI JOINT PAIN: ICD-10-CM

## 2018-03-08 PROCEDURE — 2500000003 HC RX 250 WO HCPCS

## 2018-03-08 PROCEDURE — 6360000002 HC RX W HCPCS

## 2018-03-08 PROCEDURE — G0260 INJ FOR SACROILIAC JT ANESTH: HCPCS

## 2018-03-08 PROCEDURE — 6360000004 HC RX CONTRAST MEDICATION: Performed by: RADIOLOGY

## 2018-03-08 RX ORDER — BUPIVACAINE HYDROCHLORIDE 2.5 MG/ML
5 INJECTION, SOLUTION EPIDURAL; INFILTRATION; INTRACAUDAL ONCE
Status: COMPLETED | OUTPATIENT
Start: 2018-03-08 | End: 2018-03-08

## 2018-03-08 RX ORDER — METHYLPREDNISOLONE ACETATE 80 MG/ML
80 INJECTION, SUSPENSION INTRA-ARTICULAR; INTRALESIONAL; INTRAMUSCULAR; SOFT TISSUE ONCE
Status: COMPLETED | OUTPATIENT
Start: 2018-03-08 | End: 2018-03-08

## 2018-03-08 RX ADMIN — BUPIVACAINE HYDROCHLORIDE 4 ML: 2.5 INJECTION, SOLUTION EPIDURAL; INFILTRATION; INTRACAUDAL at 09:35

## 2018-03-08 RX ADMIN — IOHEXOL 1 ML: 180 INJECTION INTRAVENOUS at 09:34

## 2018-03-08 RX ADMIN — METHYLPREDNISOLONE ACETATE 80 MG: 80 INJECTION, SUSPENSION INTRA-ARTICULAR; INTRALESIONAL; INTRAMUSCULAR; SOFT TISSUE at 09:35

## 2018-03-08 ASSESSMENT — PAIN DESCRIPTION - LOCATION
LOCATION: HIP
LOCATION: HIP

## 2018-03-08 ASSESSMENT — PAIN DESCRIPTION - DESCRIPTORS
DESCRIPTORS: ACHING
DESCRIPTORS: ACHING

## 2018-03-08 ASSESSMENT — PAIN SCALES - GENERAL
PAINLEVEL_OUTOF10: 1
PAINLEVEL_OUTOF10: 5

## 2018-03-08 ASSESSMENT — PAIN DESCRIPTION - ORIENTATION
ORIENTATION: LEFT
ORIENTATION: LEFT

## 2018-03-08 ASSESSMENT — PAIN DESCRIPTION - DIRECTION
RADIATING_TOWARDS: LEFT BUTTOCK
RADIATING_TOWARDS: LEFT BUTTOCK

## 2018-03-08 ASSESSMENT — PAIN DESCRIPTION - PAIN TYPE
TYPE: CHRONIC PAIN
TYPE: CHRONIC PAIN

## 2018-03-08 NOTE — PROGRESS NOTES
Department of Radiology  Post Procedure Progress Note      Pre-Procedure Diagnosis:  SacroIliitis     Procedure Performed:  Sacroiliac Block/Steroid injection procedure     Anesthesia: local     Findings: successful    Immediate Complications:  None    Estimated Blood Loss: minimal    SEE DICTATED PROCEDURE NOTE FOR COMPLETE DETAILS.       Soham Jones MD   3/8/2018 9:36 AM

## 2018-03-08 NOTE — PROGRESS NOTES
Formulation and discussion of sedation / procedure plans, risks, benefits, side effects and alternatives with patient and/or responsible adult completed.     Electronically signed by Tommie Sicard, MD on 3/8/2018 at 9:36 AM

## 2018-04-19 ENCOUNTER — HOSPITAL ENCOUNTER (OUTPATIENT)
Dept: INTERVENTIONAL RADIOLOGY/VASCULAR | Age: 68
Discharge: HOME | End: 2018-04-19
Payer: MEDICARE

## 2018-04-19 DIAGNOSIS — R52 PAIN: ICD-10-CM

## 2018-04-19 PROCEDURE — G0260 INJ FOR SACROILIAC JT ANESTH: HCPCS | Performed by: RADIOLOGY

## 2018-04-19 PROCEDURE — 6360000002 HC RX W HCPCS

## 2018-04-19 PROCEDURE — 2500000003 HC RX 250 WO HCPCS

## 2018-04-19 PROCEDURE — 6360000004 HC RX CONTRAST MEDICATION: Performed by: RADIOLOGY

## 2018-04-19 RX ORDER — METHYLPREDNISOLONE ACETATE 80 MG/ML
80 INJECTION, SUSPENSION INTRA-ARTICULAR; INTRALESIONAL; INTRAMUSCULAR; SOFT TISSUE ONCE
Status: COMPLETED | OUTPATIENT
Start: 2018-04-19 | End: 2018-04-19

## 2018-04-19 RX ORDER — BUPIVACAINE HYDROCHLORIDE 2.5 MG/ML
5 INJECTION, SOLUTION EPIDURAL; INFILTRATION; INTRACAUDAL ONCE
Status: COMPLETED | OUTPATIENT
Start: 2018-04-19 | End: 2018-04-19

## 2018-04-19 RX ADMIN — BUPIVACAINE HYDROCHLORIDE 4 ML: 2.5 INJECTION, SOLUTION EPIDURAL; INFILTRATION; INTRACAUDAL at 09:14

## 2018-04-19 RX ADMIN — METHYLPREDNISOLONE ACETATE 80 MG: 80 INJECTION, SUSPENSION INTRA-ARTICULAR; INTRALESIONAL; INTRAMUSCULAR; SOFT TISSUE at 09:14

## 2018-04-19 RX ADMIN — IOHEXOL 1 ML: 180 INJECTION INTRAVENOUS at 09:14

## 2018-04-19 ASSESSMENT — PAIN SCALES - GENERAL: PAINLEVEL_OUTOF10: 6

## 2018-05-22 RX ORDER — FENTANYL CITRATE 50 UG/ML
50 INJECTION, SOLUTION INTRAMUSCULAR; INTRAVENOUS ONCE
Status: CANCELLED | OUTPATIENT
Start: 2018-05-22 | End: 2018-05-22

## 2018-05-22 RX ORDER — MIDAZOLAM HYDROCHLORIDE 1 MG/ML
1 INJECTION INTRAMUSCULAR; INTRAVENOUS ONCE
Status: CANCELLED | OUTPATIENT
Start: 2018-05-22 | End: 2018-05-22

## 2018-05-22 RX ORDER — METHYLPREDNISOLONE ACETATE 80 MG/ML
80 INJECTION, SUSPENSION INTRA-ARTICULAR; INTRALESIONAL; INTRAMUSCULAR; SOFT TISSUE ONCE
Status: CANCELLED | OUTPATIENT
Start: 2018-05-22 | End: 2018-05-22

## 2018-05-22 RX ORDER — SODIUM CHLORIDE 450 MG/100ML
INJECTION, SOLUTION INTRAVENOUS CONTINUOUS
Status: CANCELLED | OUTPATIENT
Start: 2018-05-22

## 2018-05-23 ENCOUNTER — HOSPITAL ENCOUNTER (OUTPATIENT)
Dept: INTERVENTIONAL RADIOLOGY/VASCULAR | Age: 68
Discharge: HOME | End: 2018-05-23
Payer: MEDICARE

## 2018-05-23 VITALS
TEMPERATURE: 98.3 F | WEIGHT: 174.6 LBS | OXYGEN SATURATION: 95 % | SYSTOLIC BLOOD PRESSURE: 138 MMHG | DIASTOLIC BLOOD PRESSURE: 75 MMHG | HEART RATE: 76 BPM | RESPIRATION RATE: 16 BRPM | BODY MASS INDEX: 28.18 KG/M2

## 2018-05-23 PROCEDURE — 2500000003 HC RX 250 WO HCPCS

## 2018-05-23 PROCEDURE — 2580000003 HC RX 258: Performed by: RADIOLOGY

## 2018-05-23 PROCEDURE — 6360000002 HC RX W HCPCS

## 2018-05-23 PROCEDURE — G0260 INJ FOR SACROILIAC JT ANESTH: HCPCS | Performed by: RADIOLOGY

## 2018-05-23 RX ORDER — METHYLPREDNISOLONE ACETATE 80 MG/ML
80 INJECTION, SUSPENSION INTRA-ARTICULAR; INTRALESIONAL; INTRAMUSCULAR; SOFT TISSUE ONCE
Status: COMPLETED | OUTPATIENT
Start: 2018-05-23 | End: 2018-05-23

## 2018-05-23 RX ORDER — MIDAZOLAM HYDROCHLORIDE 1 MG/ML
1 INJECTION INTRAMUSCULAR; INTRAVENOUS ONCE
Status: COMPLETED | OUTPATIENT
Start: 2018-05-23 | End: 2018-05-23

## 2018-05-23 RX ORDER — BUPIVACAINE HYDROCHLORIDE 2.5 MG/ML
5 INJECTION, SOLUTION EPIDURAL; INFILTRATION; INTRACAUDAL ONCE
Status: COMPLETED | OUTPATIENT
Start: 2018-05-23 | End: 2018-05-23

## 2018-05-23 RX ORDER — FENTANYL CITRATE 50 UG/ML
50 INJECTION, SOLUTION INTRAMUSCULAR; INTRAVENOUS ONCE
Status: COMPLETED | OUTPATIENT
Start: 2018-05-23 | End: 2018-05-23

## 2018-05-23 RX ORDER — SODIUM CHLORIDE 450 MG/100ML
INJECTION, SOLUTION INTRAVENOUS CONTINUOUS
Status: DISCONTINUED | OUTPATIENT
Start: 2018-05-23 | End: 2018-05-24 | Stop reason: HOSPADM

## 2018-05-23 RX ADMIN — BUPIVACAINE HYDROCHLORIDE 10 ML: 2.5 INJECTION, SOLUTION EPIDURAL; INFILTRATION; INTRACAUDAL at 13:43

## 2018-05-23 RX ADMIN — FENTANYL CITRATE 50 MCG: 50 INJECTION, SOLUTION INTRAMUSCULAR; INTRAVENOUS at 13:29

## 2018-05-23 RX ADMIN — FENTANYL CITRATE 50 MCG: 50 INJECTION, SOLUTION INTRAMUSCULAR; INTRAVENOUS at 13:25

## 2018-05-23 RX ADMIN — MIDAZOLAM HYDROCHLORIDE 1 MG: 1 INJECTION INTRAMUSCULAR; INTRAVENOUS at 13:29

## 2018-05-23 RX ADMIN — SODIUM CHLORIDE: 4.5 INJECTION, SOLUTION INTRAVENOUS at 12:40

## 2018-05-23 RX ADMIN — MIDAZOLAM HYDROCHLORIDE 1 MG: 1 INJECTION INTRAMUSCULAR; INTRAVENOUS at 13:25

## 2018-05-23 RX ADMIN — METHYLPREDNISOLONE ACETATE 80 MG: 80 INJECTION, SUSPENSION INTRA-ARTICULAR; INTRALESIONAL; INTRAMUSCULAR; SOFT TISSUE at 13:43

## 2018-05-23 ASSESSMENT — PAIN - FUNCTIONAL ASSESSMENT: PAIN_FUNCTIONAL_ASSESSMENT: 0-10

## 2018-05-23 ASSESSMENT — PAIN SCALES - GENERAL
PAINLEVEL_OUTOF10: 0
PAINLEVEL_OUTOF10: 0
PAINLEVEL_OUTOF10: 5
PAINLEVEL_OUTOF10: 0

## 2018-05-23 ASSESSMENT — PAIN DESCRIPTION - DESCRIPTORS: DESCRIPTORS: ACHING;SHARP;BURNING

## 2018-07-16 ENCOUNTER — OFFICE VISIT (OUTPATIENT)
Dept: FAMILY MEDICINE CLINIC | Age: 68
End: 2018-07-16

## 2018-07-16 VITALS
DIASTOLIC BLOOD PRESSURE: 84 MMHG | HEIGHT: 66 IN | RESPIRATION RATE: 14 BRPM | HEART RATE: 78 BPM | SYSTOLIC BLOOD PRESSURE: 136 MMHG | WEIGHT: 177.8 LBS | BODY MASS INDEX: 28.57 KG/M2

## 2018-07-16 DIAGNOSIS — M15.9 PRIMARY OSTEOARTHRITIS INVOLVING MULTIPLE JOINTS: ICD-10-CM

## 2018-07-16 DIAGNOSIS — I10 ESSENTIAL HYPERTENSION: Primary | ICD-10-CM

## 2018-07-16 PROCEDURE — 1101F PT FALLS ASSESS-DOCD LE1/YR: CPT | Performed by: FAMILY MEDICINE

## 2018-07-16 PROCEDURE — 99213 OFFICE O/P EST LOW 20 MIN: CPT | Performed by: FAMILY MEDICINE

## 2018-07-16 ASSESSMENT — ENCOUNTER SYMPTOMS
BACK PAIN: 1
CONSTIPATION: 0
VOMITING: 0
EYES NEGATIVE: 1
NAUSEA: 0
CHEST TIGHTNESS: 0
DIARRHEA: 0
SHORTNESS OF BREATH: 0
ABDOMINAL PAIN: 0
COUGH: 0

## 2018-07-16 NOTE — PROGRESS NOTES
weakness, light-headedness, numbness and headaches. Psychiatric/Behavioral: Negative. Objective:   /84 (Site: Left Arm, Position: Sitting, Cuff Size: Medium Adult)   Pulse 78   Resp 14   Ht 5' 6\" (1.676 m)   Wt 177 lb 12.8 oz (80.6 kg)   Breastfeeding? No   BMI 28.70 kg/m²   Wt Readings from Last 3 Encounters:   07/16/18 177 lb 12.8 oz (80.6 kg)   05/23/18 174 lb 9.6 oz (79.2 kg)   02/28/18 175 lb 6 oz (79.5 kg)     Physical Exam   Constitutional: She is oriented to person, place, and time. She appears well-developed and well-nourished. No distress. HENT:   Head: Normocephalic and atraumatic. Eyes: Conjunctivae and EOM are normal. Pupils are equal, round, and reactive to light. Right eye exhibits no discharge. Left eye exhibits no discharge. No scleral icterus. Neck: Normal range of motion. Neck supple. No JVD present. No thyromegaly present. Cardiovascular: Normal rate, regular rhythm and normal heart sounds. No murmur heard. Pulmonary/Chest: Breath sounds normal. No respiratory distress. She has no wheezes. She has no rhonchi. She has no rales. Abdominal: Soft. Bowel sounds are normal. She exhibits no distension and no mass. There is no hepatosplenomegaly. There is no tenderness. There is no rebound and no guarding. Lymphadenopathy:     She has no cervical adenopathy. Neurological: She is alert and oriented to person, place, and time. Skin: Skin is warm and dry. No rash noted. She is not diaphoretic. Psychiatric: She has a normal mood and affect. Her behavior is normal.   Nursing note and vitals reviewed. Assessment:       Diagnosis Orders   1. Essential hypertension     2.  Primary osteoarthritis involving multiple joints         Plan:      Requested Prescriptions      No prescriptions requested or ordered in this encounter     Current Outpatient Prescriptions   Medication Sig Dispense Refill    lisinopril (PRINIVIL;ZESTRIL) 5 MG tablet TAKE 1 TABLET DAILY 90 tablet 1

## 2018-07-18 RX ORDER — ATENOLOL AND CHLORTHALIDONE TABLET 50; 25 MG/1; MG/1
1 TABLET ORAL 2 TIMES DAILY
Qty: 180 TABLET | Refills: 1 | Status: SHIPPED | OUTPATIENT
Start: 2018-07-18 | End: 2018-08-08 | Stop reason: SDUPTHER

## 2018-07-18 RX ORDER — LISINOPRIL 5 MG/1
TABLET ORAL
Qty: 90 TABLET | Refills: 1 | Status: SHIPPED | OUTPATIENT
Start: 2018-07-18 | End: 2019-03-18 | Stop reason: SDUPTHER

## 2018-07-18 NOTE — TELEPHONE ENCOUNTER
Pt called requesting 90 day Rx of the following:    Atenolol-chlorthalidone 50-25 mg one tablet 2 times daily    Lisinopril 5 mg one tablet daily    [facility]

## 2018-07-18 NOTE — TELEPHONE ENCOUNTER
Date of last visit:  7/16/2018  Date of next visit:  11/19/2018    Requested Prescriptions      No prescriptions requested or ordered in this encounter

## 2018-08-08 RX ORDER — ATENOLOL AND CHLORTHALIDONE TABLET 50; 25 MG/1; MG/1
1 TABLET ORAL 2 TIMES DAILY
Qty: 30 TABLET | Refills: 0 | Status: SHIPPED | OUTPATIENT
Start: 2018-08-08 | End: 2018-10-03 | Stop reason: SDUPTHER

## 2018-08-15 LAB
ABSOLUTE BASO #: 0.1 K/UL (ref 0–0.1)
ABSOLUTE EOS #: 0.3 K/UL (ref 0.1–0.4)
ABSOLUTE LYMPH #: 3.5 K/UL (ref 0.8–5.2)
ABSOLUTE MONO #: 0.8 K/UL (ref 0.1–0.9)
ABSOLUTE NEUT #: 4.3 K/UL (ref 1.3–9.1)
ANION GAP SERPL CALCULATED.3IONS-SCNC: 13 MEQ/L (ref 10–19)
BASOPHILS RELATIVE PERCENT: 0.7 %
BUN BLDV-MCNC: 24 MG/DL (ref 8–23)
CALCIUM SERPL-MCNC: 10.4 MG/DL (ref 8.5–10.5)
CHLORIDE BLD-SCNC: 99 MEQ/L (ref 95–107)
CO2: 27 MEQ/L (ref 19–31)
CREAT SERPL-MCNC: 0.9 MG/DL (ref 0.6–1.3)
EGFR AFRICAN AMERICAN: 76.1 ML/MIN/1.73 M2
EGFR IF NONAFRICAN AMERICAN: 65.7 ML/MIN/1.73 M2
EOSINOPHILS RELATIVE PERCENT: 3.5 %
GLUCOSE: 105 MG/DL (ref 70–99)
HCT VFR BLD CALC: 35.6 % (ref 36–48)
HEMOGLOBIN: 12.2 G/DL (ref 12–16)
LYMPHOCYTE %: 38.9 %
MCH RBC QN AUTO: 30.8 PG (ref 27–34)
MCHC RBC AUTO-ENTMCNC: 34.3 G/DL (ref 31–36)
MCV RBC AUTO: 89.9 FL (ref 80–100)
MONOCYTES # BLD: 9.2 %
NEUTROPHILS RELATIVE PERCENT: 47.3 %
PDW BLD-RTO: 11.7 % (ref 10.8–14.8)
PLATELETS: 303 K/UL (ref 150–450)
POTASSIUM SERPL-SCNC: 4.6 MEQ/L (ref 3.5–5.4)
RBC: 3.96 M/UL (ref 4–5.5)
SODIUM BLD-SCNC: 139 MEQ/L (ref 135–146)
WBC: 9 K/UL (ref 3.7–10.8)

## 2018-08-20 ENCOUNTER — TELEPHONE (OUTPATIENT)
Dept: FAMILY MEDICINE CLINIC | Age: 68
End: 2018-08-20

## 2018-09-06 ENCOUNTER — HOSPITAL ENCOUNTER (OUTPATIENT)
Dept: WOMENS IMAGING | Age: 68
Discharge: HOME | End: 2018-09-06
Payer: MEDICARE

## 2018-09-06 DIAGNOSIS — Z12.31 VISIT FOR SCREENING MAMMOGRAM: ICD-10-CM

## 2018-09-06 PROCEDURE — 77067 SCR MAMMO BI INCL CAD: CPT

## 2018-09-10 ENCOUNTER — OFFICE VISIT (OUTPATIENT)
Dept: FAMILY MEDICINE CLINIC | Age: 68
End: 2018-09-10

## 2018-09-10 VITALS
RESPIRATION RATE: 13 BRPM | HEART RATE: 78 BPM | SYSTOLIC BLOOD PRESSURE: 128 MMHG | WEIGHT: 179.25 LBS | BODY MASS INDEX: 28.81 KG/M2 | HEIGHT: 66 IN | DIASTOLIC BLOOD PRESSURE: 74 MMHG

## 2018-09-10 DIAGNOSIS — T78.40XA ALLERGIC REACTION, INITIAL ENCOUNTER: Primary | ICD-10-CM

## 2018-09-10 PROCEDURE — 1101F PT FALLS ASSESS-DOCD LE1/YR: CPT | Performed by: FAMILY MEDICINE

## 2018-09-10 PROCEDURE — 99213 OFFICE O/P EST LOW 20 MIN: CPT | Performed by: FAMILY MEDICINE

## 2018-09-10 RX ORDER — PREDNISONE 10 MG/1
TABLET ORAL
Qty: 16 TABLET | Refills: 0 | Status: SHIPPED | OUTPATIENT
Start: 2018-09-10 | End: 2018-11-19 | Stop reason: ALTCHOICE

## 2018-09-10 ASSESSMENT — ENCOUNTER SYMPTOMS
DIARRHEA: 0
NAUSEA: 0
CHEST TIGHTNESS: 0
EYES NEGATIVE: 1
ABDOMINAL PAIN: 0
CONSTIPATION: 0
SHORTNESS OF BREATH: 0
COUGH: 0
VOMITING: 0

## 2018-09-10 NOTE — PROGRESS NOTES
Visit Date: 9/10/2018    Viviana Mehta is a 75 y.o. female who presents today for:  Chief Complaint   Patient presents with   [facility]     both upper thighs  She is using OTC Cortizone. HPI:     HPI    has a current medication list which includes the following prescription(s): prednisone, atenolol-chlorthalidone, lisinopril, aspirin, NONFORMULARY, multiple vitamin, calcium citrate, omeprazole, vitamin c, and cyclobenzaprine.     Allergies   Allergen Reactions    Minocycline Swelling    Sulfa Antibiotics Swelling    Bactrim Diarrhea    Erythromycin Diarrhea    Nortriptyline Itching    Vicodin [Hydrocodone-Acetaminophen] Other (See Comments)     Headache.  severe       Social History   Substance Use Topics    Smoking status: Never Smoker    Smokeless tobacco: Never Used    Alcohol use No       Past Medical History:   Diagnosis Date    Cervical radiculopathy     Degenerative arthritis of cervical spine     Degenerative lumbar disc     Fibromyalgia     GERD (gastroesophageal reflux disease)     Hydronephrosis 02/11/2016    right kidney    Hypertension     Osteoarthritis     neck,hands    Spinal stenosis     UPJ (ureteropelvic junction) obstruction 06/22/2002    Vitreous detachment of left eye 1996    Vitreous detachment of right eye 2005       Past Surgical History:   Procedure Laterality Date    BACK SURGERY  05/19/2017    L2-5 Decompression L2-5 posterior fusion and tlif by Dr Pauly Calderón  08/30/2004    biopsy, Dr. Winona Shape - [facility]  02/07/2015    CARDIOVASCULAR STRESS TEST  2014    CATARACT REMOVAL WITH IMPLANT Left 04/25/2016    CATARACT REMOVAL WITH IMPLANT Right 05/09/2016    CERVICAL One [address] [facility] SURGERY  01/18/2008    C5-6 discectomy, Dr. Lucila Roy -[facility]  10/02 10/05 11/10   Russ Cantu, [facility]    HIP ARTHROSCOPY Right 07/17/2014    labrum repair and PSCAS lengthening - Dr. Karla Bill in [place]  HYSTERECTOMY  2001    Dr. Brenda Cardozo Right 2014    TOTAL HIP, DR. Haskins Pac - [facility]    LUMBAR [facility] [address] [facility] SURGERY  2000    [facility]  [facility]    L3-4 with cage, Dr. Richard Mock  2016    radiofrequency ablation right SI joint - Dr. Carol Chase Right 2016    right SI joint    Elgavi Neal Right 2012    Dr. Chris [place] Left 2014    Dr. Dorian Gilbert    [address] Left 2017    LEFT TOTAL HIP ARTHROPLASTY performed by Promise Sharma MD at [address] [place] [address]   2016    [facility]  D/T ureteropelvic junction obstruction, hydronephrosis       Family History   Problem Relation Age of Onset    Heart Disease Mother     Arthritis Father     Kidney Disease Father     Other Father         MDS   81 y/o    Heart Disease Father 61        cabg    Cancer Father         kidney    Stroke Paternal Grandmother     Diabetes Paternal Grandmother     High Blood Pressure Sister     High Cholesterol Sister      Subjective:      Review of Systems   Constitutional: Negative for activity change, appetite change, diaphoresis, fatigue and fever. HENT: Negative. Eyes: Negative. Respiratory: Negative for cough, chest tightness and shortness of breath. Cardiovascular: Negative for chest pain, palpitations and leg swelling. Gastrointestinal: Negative for abdominal pain, constipation, diarrhea, nausea and vomiting. Genitourinary: Negative. Musculoskeletal: Negative. Skin: Positive for rash. Very itchy rash. Neurological: Negative for dizziness, syncope, weakness, light-headedness, numbness and headaches. Psychiatric/Behavioral: Negative.         Objective:   /74 (Site: tablets 2 times a day for 2 days, then 1 Tablet 2 times a day for 2 days, then 1 tablet daily till gone 16 tablet 0    atenolol-chlorthalidone (TENORETIC) 50-25 MG per tablet Take 1 tablet by mouth 2 times daily 30 tablet 0    lisinopril (PRINIVIL;ZESTRIL) 5 MG tablet TAKE 1 TABLET DAILY 90 tablet 1    aspirin 81 MG tablet Take 1 tablet by mouth daily 30 tablet 3    NONFORMULARY Take 2 tablets by mouth daily Eye promise      Multiple Vitamin TABS Take by mouth every morning      CALCIUM CITRATE PO Take 1,260 mg by mouth 2 times daily       omeprazole (PRILOSEC) 20 MG capsule Take 1 capsule by mouth daily   2    Ascorbic Acid (VITAMIN C) 1000 MG tablet Take 1,000 mg by mouth daily       cyclobenzaprine (FLEXERIL) 10 MG tablet Take 10 mg by mouth nightly. No current facility-administered medications for this visit. No orders of the defined types were placed in this encounter. Continue current medications. Return if symptoms worsen or fail to improve. Discussed use, benefit, and side effects of prescribed medications. All patient questions answered. Pt voiced understanding. Instructed to continue current medications, diet and exercise. Patient agreed with treatment plan.

## 2018-10-03 RX ORDER — ATENOLOL AND CHLORTHALIDONE TABLET 50; 25 MG/1; MG/1
1 TABLET ORAL 2 TIMES DAILY
Qty: 30 TABLET | Refills: 2 | Status: SHIPPED | OUTPATIENT
Start: 2018-10-03 | End: 2018-11-19 | Stop reason: SDUPTHER

## 2018-10-03 NOTE — TELEPHONE ENCOUNTER
Lakisha Orlando called requesting a refill of the below medication which has been pended for you:     Requested Prescriptions     Pending Prescriptions Disp Refills    atenolol-chlorthalidone (TENORETIC) 50-25 MG per tablet 30 tablet 0     Sig: Take 1 tablet by mouth 2 times daily       Last Appointment Date: 9/10/2018  Next Appointment Date: 11/19/2018    Allergies   Allergen Reactions    Minocycline Swelling    Sulfa Antibiotics Swelling    Bactrim Diarrhea    Erythromycin Diarrhea    Nortriptyline Itching    Vicodin [Hydrocodone-Acetaminophen] Other (See Comments)     Headache.  severe

## 2018-10-03 NOTE — TELEPHONE ENCOUNTER
Pt called requesting a refill of:    atenolol-chlorthalidone (TENORETIC) 50-25 MG per tablet bid    Send to [place] on [employer] [facility]. [email or web address] is currently out and able to send to patient.

## 2018-10-12 ENCOUNTER — TELEPHONE (OUTPATIENT)
Dept: FAMILY MEDICINE CLINIC | Age: 68
End: 2018-10-12

## 2018-10-15 ENCOUNTER — TELEPHONE (OUTPATIENT)
Dept: FAMILY MEDICINE CLINIC | Age: 68
End: 2018-10-15

## 2018-10-15 DIAGNOSIS — R93.89 ABNORMAL CHEST X-RAY: Primary | ICD-10-CM

## 2018-10-17 ENCOUNTER — HOSPITAL ENCOUNTER (OUTPATIENT)
Dept: CT IMAGING | Age: 68
Discharge: HOME | End: 2018-10-17
Payer: MEDICARE

## 2018-10-17 ENCOUNTER — TELEPHONE (OUTPATIENT)
Dept: FAMILY MEDICINE CLINIC | Age: 68
End: 2018-10-17

## 2018-10-17 DIAGNOSIS — R93.89 ABNORMAL CHEST X-RAY: ICD-10-CM

## 2018-10-17 PROCEDURE — 71250 CT THORAX DX C-: CPT

## 2018-11-19 ENCOUNTER — OFFICE VISIT (OUTPATIENT)
Dept: FAMILY MEDICINE CLINIC | Age: 68
End: 2018-11-19

## 2018-11-19 VITALS
DIASTOLIC BLOOD PRESSURE: 82 MMHG | HEIGHT: 66 IN | WEIGHT: 169.4 LBS | BODY MASS INDEX: 27.23 KG/M2 | RESPIRATION RATE: 14 BRPM | HEART RATE: 80 BPM | SYSTOLIC BLOOD PRESSURE: 136 MMHG

## 2018-11-19 DIAGNOSIS — M15.9 PRIMARY OSTEOARTHRITIS INVOLVING MULTIPLE JOINTS: ICD-10-CM

## 2018-11-19 DIAGNOSIS — I10 ESSENTIAL HYPERTENSION: Primary | ICD-10-CM

## 2018-11-19 LAB
CHOLESTEROL, FASTING: NORMAL
HDLC SERPL-MCNC: NORMAL MG/DL (ref 35–70)
LDL CHOLESTEROL CALCULATED: 89 MG/DL (ref 0–160)
TRIGLYCERIDE, FASTING: NORMAL

## 2018-11-19 PROCEDURE — 1101F PT FALLS ASSESS-DOCD LE1/YR: CPT | Performed by: FAMILY MEDICINE

## 2018-11-19 PROCEDURE — 99213 OFFICE O/P EST LOW 20 MIN: CPT | Performed by: FAMILY MEDICINE

## 2018-11-19 RX ORDER — TRAMADOL HYDROCHLORIDE 50 MG/1
TABLET ORAL
COMMUNITY
Start: 2018-11-07 | End: 2018-11-19

## 2018-11-19 RX ORDER — HYDROCODONE BITARTRATE AND ACETAMINOPHEN 5; 325 MG/1; MG/1
TABLET ORAL
COMMUNITY
Start: 2018-11-07 | End: 2019-03-18 | Stop reason: ALTCHOICE

## 2018-11-19 RX ORDER — ATENOLOL AND CHLORTHALIDONE TABLET 50; 25 MG/1; MG/1
1 TABLET ORAL 2 TIMES DAILY
Qty: 180 TABLET | Refills: 1 | Status: SHIPPED | OUTPATIENT
Start: 2018-11-19 | End: 2019-05-23 | Stop reason: SDUPTHER

## 2018-11-19 ASSESSMENT — ENCOUNTER SYMPTOMS
VOMITING: 0
NAUSEA: 0
SHORTNESS OF BREATH: 0
CONSTIPATION: 0
COUGH: 0
ABDOMINAL PAIN: 0
DIARRHEA: 0
CHEST TIGHTNESS: 0
EYES NEGATIVE: 1

## 2018-11-19 ASSESSMENT — PATIENT HEALTH QUESTIONNAIRE - PHQ9
1. LITTLE INTEREST OR PLEASURE IN DOING THINGS: 0
SUM OF ALL RESPONSES TO PHQ QUESTIONS 1-9: 0
2. FEELING DOWN, DEPRESSED OR HOPELESS: 0
SUM OF ALL RESPONSES TO PHQ9 QUESTIONS 1 & 2: 0
SUM OF ALL RESPONSES TO PHQ QUESTIONS 1-9: 0

## 2018-11-19 NOTE — PROGRESS NOTES
PSCAS lengthening - Dr. Danny Alonzo in [facility]  2001    Dr. Adrian Rojas - [facility] Right 2014    TOTAL HIP, DR. Brando Wing - [facility]    JOINT REPLACEMENT Left 10/22/2018    LUMBAR One Arch Juan SURGERY  2000    L3-5 discectomy, Dr. Andreina Patrick, [facility]  2001    L3-4 with cage, Dr. Sarina Hutchins - [facility]  2016    radiofrequency ablation right SI joint - Dr. Nydia Rodgers Right 2016    right SI joint    Oziel Bread Right 2012    Dr. Gillian Abbott Left 2014    Dr. Gabriel Langley    [address] Left 2017    LEFT TOTAL HIP ARTHROPLASTY performed by Tevin Cisneros MD at [address] [place] [address] DrLeticia  2016    [address] [place] [address]  D/T ureteropelvic junction obstruction, hydronephrosis       Family History   Problem Relation Age of Onset    Heart Disease Mother     Arthritis Father     Kidney Disease Father     Other Father         MDS   81 y/o    Heart Disease Father 61        cabg    Cancer Father         kidney    Stroke Paternal Grandmother     Diabetes Paternal Grandmother     High Blood Pressure Sister     High Cholesterol Sister      Subjective:     Review of Systems   Constitutional: Negative for activity change, appetite change, diaphoresis, fatigue and fever. HENT: Negative. Eyes: Negative. Respiratory: Negative for cough, chest tightness and shortness of breath. Cardiovascular: Negative for chest pain, palpitations and leg swelling. Gastrointestinal: Negative for abdominal pain, constipation, diarrhea, nausea and vomiting. Genitourinary: Negative. Musculoskeletal:        Having knee post surgical pain. Skin: Negative. Negative for rash.    Neurological: Negative for dizziness, syncope, weakness, light-headedness, numbness

## 2019-03-18 ENCOUNTER — OFFICE VISIT (OUTPATIENT)
Dept: FAMILY MEDICINE CLINIC | Age: 69
End: 2019-03-18

## 2019-03-18 ENCOUNTER — TELEPHONE (OUTPATIENT)
Dept: FAMILY MEDICINE CLINIC | Age: 69
End: 2019-03-18

## 2019-03-18 VITALS
HEART RATE: 70 BPM | SYSTOLIC BLOOD PRESSURE: 132 MMHG | DIASTOLIC BLOOD PRESSURE: 80 MMHG | BODY MASS INDEX: 28.09 KG/M2 | WEIGHT: 174.8 LBS | RESPIRATION RATE: 14 BRPM | HEIGHT: 66 IN

## 2019-03-18 DIAGNOSIS — M15.9 PRIMARY OSTEOARTHRITIS INVOLVING MULTIPLE JOINTS: ICD-10-CM

## 2019-03-18 DIAGNOSIS — I10 ESSENTIAL HYPERTENSION: Primary | ICD-10-CM

## 2019-03-18 DIAGNOSIS — R89.9 ABNORMAL LABORATORY TEST RESULT: Primary | ICD-10-CM

## 2019-03-18 DIAGNOSIS — K21.9 GASTROESOPHAGEAL REFLUX DISEASE, ESOPHAGITIS PRESENCE NOT SPECIFIED: ICD-10-CM

## 2019-03-18 PROCEDURE — 99213 OFFICE O/P EST LOW 20 MIN: CPT | Performed by: FAMILY MEDICINE

## 2019-03-18 PROCEDURE — 1036F TOBACCO NON-USER: CPT | Performed by: FAMILY MEDICINE

## 2019-03-18 PROCEDURE — G8427 DOCREV CUR MEDS BY ELIG CLIN: HCPCS | Performed by: FAMILY MEDICINE

## 2019-03-18 PROCEDURE — 4040F PNEUMOC VAC/ADMIN/RCVD: CPT | Performed by: FAMILY MEDICINE

## 2019-03-18 PROCEDURE — 1123F ACP DISCUSS/DSCN MKR DOCD: CPT | Performed by: FAMILY MEDICINE

## 2019-03-18 PROCEDURE — 1090F PRES/ABSN URINE INCON ASSESS: CPT | Performed by: FAMILY MEDICINE

## 2019-03-18 PROCEDURE — 1101F PT FALLS ASSESS-DOCD LE1/YR: CPT | Performed by: FAMILY MEDICINE

## 2019-03-18 PROCEDURE — 3017F COLORECTAL CA SCREEN DOC REV: CPT | Performed by: FAMILY MEDICINE

## 2019-03-18 PROCEDURE — G8399 PT W/DXA RESULTS DOCUMENT: HCPCS | Performed by: FAMILY MEDICINE

## 2019-03-18 PROCEDURE — G8419 CALC BMI OUT NRM PARAM NOF/U: HCPCS | Performed by: FAMILY MEDICINE

## 2019-03-18 RX ORDER — LISINOPRIL 5 MG/1
TABLET ORAL
Qty: 90 TABLET | Refills: 1 | Status: SHIPPED | OUTPATIENT
Start: 2019-03-18 | End: 2019-07-22 | Stop reason: SDUPTHER

## 2019-03-18 ASSESSMENT — ENCOUNTER SYMPTOMS
CHEST TIGHTNESS: 0
VOMITING: 0
CONSTIPATION: 0
ABDOMINAL PAIN: 0
EYES NEGATIVE: 1
DIARRHEA: 0
NAUSEA: 0
SHORTNESS OF BREATH: 0
COUGH: 0

## 2019-03-18 ASSESSMENT — PATIENT HEALTH QUESTIONNAIRE - PHQ9
1. LITTLE INTEREST OR PLEASURE IN DOING THINGS: 0
SUM OF ALL RESPONSES TO PHQ9 QUESTIONS 1 & 2: 0
SUM OF ALL RESPONSES TO PHQ QUESTIONS 1-9: 0
2. FEELING DOWN, DEPRESSED OR HOPELESS: 0
SUM OF ALL RESPONSES TO PHQ QUESTIONS 1-9: 0

## 2019-05-16 LAB
ABSOLUTE BASO #: 0.1 K/UL (ref 0–0.1)
ABSOLUTE EOS #: 0.2 K/UL (ref 0.1–0.4)
ABSOLUTE LYMPH #: 3 K/UL (ref 0.8–5.2)
ABSOLUTE MONO #: 0.7 K/UL (ref 0.1–0.9)
ABSOLUTE NEUT #: 5.6 K/UL (ref 1.3–9.1)
BASOPHILS RELATIVE PERCENT: 0.6 %
EOSINOPHILS RELATIVE PERCENT: 2.4 %
HCT VFR BLD CALC: 35 % (ref 36–48)
HEMOGLOBIN: 12.1 G/DL (ref 12–16)
LYMPHOCYTE %: 31.3 %
MCH RBC QN AUTO: 30.9 PG (ref 27–34)
MCHC RBC AUTO-ENTMCNC: 34.6 G/DL (ref 31–36)
MCV RBC AUTO: 89.3 FL (ref 80–100)
MONOCYTES # BLD: 7.6 %
NEUTROPHILS RELATIVE PERCENT: 57.8 %
PDW BLD-RTO: 12.2 % (ref 10.8–14.8)
PLATELETS: 259 K/UL (ref 150–450)
RBC: 3.92 M/UL (ref 4–5.5)
WBC: 9.7 K/UL (ref 3.7–10.8)

## 2019-07-22 ENCOUNTER — OFFICE VISIT (OUTPATIENT)
Dept: FAMILY MEDICINE CLINIC | Age: 69
End: 2019-07-22

## 2019-07-22 VITALS
RESPIRATION RATE: 14 BRPM | WEIGHT: 170.8 LBS | HEART RATE: 74 BPM | BODY MASS INDEX: 27.45 KG/M2 | SYSTOLIC BLOOD PRESSURE: 124 MMHG | DIASTOLIC BLOOD PRESSURE: 72 MMHG | HEIGHT: 66 IN

## 2019-07-22 DIAGNOSIS — I10 ESSENTIAL HYPERTENSION: Primary | ICD-10-CM

## 2019-07-22 DIAGNOSIS — R73.9 HYPERGLYCEMIA: ICD-10-CM

## 2019-07-22 DIAGNOSIS — K21.9 GASTROESOPHAGEAL REFLUX DISEASE, ESOPHAGITIS PRESENCE NOT SPECIFIED: ICD-10-CM

## 2019-07-22 LAB
CHP ED QC CHECK: ABNORMAL
GLUCOSE BLD-MCNC: 122 MG/DL
HBA1C MFR BLD: 5.6 %

## 2019-07-22 PROCEDURE — 1036F TOBACCO NON-USER: CPT | Performed by: FAMILY MEDICINE

## 2019-07-22 PROCEDURE — 4040F PNEUMOC VAC/ADMIN/RCVD: CPT | Performed by: FAMILY MEDICINE

## 2019-07-22 PROCEDURE — 82962 GLUCOSE BLOOD TEST: CPT | Performed by: FAMILY MEDICINE

## 2019-07-22 PROCEDURE — 1123F ACP DISCUSS/DSCN MKR DOCD: CPT | Performed by: FAMILY MEDICINE

## 2019-07-22 PROCEDURE — 83036 HEMOGLOBIN GLYCOSYLATED A1C: CPT | Performed by: FAMILY MEDICINE

## 2019-07-22 PROCEDURE — G8399 PT W/DXA RESULTS DOCUMENT: HCPCS | Performed by: FAMILY MEDICINE

## 2019-07-22 PROCEDURE — G8427 DOCREV CUR MEDS BY ELIG CLIN: HCPCS | Performed by: FAMILY MEDICINE

## 2019-07-22 PROCEDURE — 3017F COLORECTAL CA SCREEN DOC REV: CPT | Performed by: FAMILY MEDICINE

## 2019-07-22 PROCEDURE — G8419 CALC BMI OUT NRM PARAM NOF/U: HCPCS | Performed by: FAMILY MEDICINE

## 2019-07-22 PROCEDURE — 1090F PRES/ABSN URINE INCON ASSESS: CPT | Performed by: FAMILY MEDICINE

## 2019-07-22 PROCEDURE — 99213 OFFICE O/P EST LOW 20 MIN: CPT | Performed by: FAMILY MEDICINE

## 2019-07-22 RX ORDER — LISINOPRIL 5 MG/1
TABLET ORAL
Qty: 90 TABLET | Refills: 1 | Status: SHIPPED | OUTPATIENT
Start: 2019-07-22 | End: 2020-03-16 | Stop reason: SDUPTHER

## 2019-07-22 ASSESSMENT — ENCOUNTER SYMPTOMS
EYES NEGATIVE: 1
DIARRHEA: 0
NAUSEA: 0
ABDOMINAL PAIN: 0
CONSTIPATION: 0
CHEST TIGHTNESS: 0
VOMITING: 0
SHORTNESS OF BREATH: 0
COUGH: 0

## 2019-08-08 ENCOUNTER — OFFICE VISIT (OUTPATIENT)
Dept: UROLOGY | Age: 69
End: 2019-08-08
Payer: MEDICARE

## 2019-08-08 VITALS
SYSTOLIC BLOOD PRESSURE: 104 MMHG | BODY MASS INDEX: 27.32 KG/M2 | WEIGHT: 170 LBS | DIASTOLIC BLOOD PRESSURE: 64 MMHG | HEIGHT: 66 IN

## 2019-08-08 DIAGNOSIS — Q62.11 STENOSIS OF URETEROPELVIC JUNCTION (UPJ): Primary | ICD-10-CM

## 2019-08-08 LAB
BILIRUBIN URINE: NEGATIVE
BLOOD URINE, POC: NEGATIVE
CHARACTER, URINE: CLEAR
COLOR, URINE: YELLOW
GLUCOSE URINE: NEGATIVE MG/DL
KETONES, URINE: NEGATIVE
LEUKOCYTE CLUMPS, URINE: NEGATIVE
NITRITE, URINE: NEGATIVE
PH, URINE: 5 (ref 5–9)
PROTEIN, URINE: NEGATIVE MG/DL
SPECIFIC GRAVITY, URINE: 1.02 (ref 1–1.03)
UROBILINOGEN, URINE: 0.2 EU/DL (ref 0–1)

## 2019-08-08 PROCEDURE — 81003 URINALYSIS AUTO W/O SCOPE: CPT | Performed by: NURSE PRACTITIONER

## 2019-08-08 PROCEDURE — 1090F PRES/ABSN URINE INCON ASSESS: CPT | Performed by: NURSE PRACTITIONER

## 2019-08-08 PROCEDURE — 3017F COLORECTAL CA SCREEN DOC REV: CPT | Performed by: NURSE PRACTITIONER

## 2019-08-08 PROCEDURE — 99214 OFFICE O/P EST MOD 30 MIN: CPT | Performed by: NURSE PRACTITIONER

## 2019-08-08 PROCEDURE — 4040F PNEUMOC VAC/ADMIN/RCVD: CPT | Performed by: NURSE PRACTITIONER

## 2019-08-08 PROCEDURE — G8419 CALC BMI OUT NRM PARAM NOF/U: HCPCS | Performed by: NURSE PRACTITIONER

## 2019-08-08 PROCEDURE — G8399 PT W/DXA RESULTS DOCUMENT: HCPCS | Performed by: NURSE PRACTITIONER

## 2019-08-08 PROCEDURE — G8427 DOCREV CUR MEDS BY ELIG CLIN: HCPCS | Performed by: NURSE PRACTITIONER

## 2019-08-08 PROCEDURE — 1036F TOBACCO NON-USER: CPT | Performed by: NURSE PRACTITIONER

## 2019-08-08 PROCEDURE — 1123F ACP DISCUSS/DSCN MKR DOCD: CPT | Performed by: NURSE PRACTITIONER

## 2019-08-14 ENCOUNTER — HOSPITAL ENCOUNTER (OUTPATIENT)
Dept: ULTRASOUND IMAGING | Age: 69
Discharge: HOME | End: 2019-08-14
Payer: MEDICARE

## 2019-08-14 ENCOUNTER — TELEPHONE (OUTPATIENT)
Dept: UROLOGY | Age: 69
End: 2019-08-14

## 2019-08-14 DIAGNOSIS — Q62.11 STENOSIS OF URETEROPELVIC JUNCTION (UPJ): ICD-10-CM

## 2019-08-14 DIAGNOSIS — R10.9 FLANK PAIN: Primary | ICD-10-CM

## 2019-08-14 PROCEDURE — 76770 US EXAM ABDO BACK WALL COMP: CPT

## 2019-08-22 DIAGNOSIS — Q62.11 STENOSIS OF URETEROPELVIC JUNCTION (UPJ): Primary | ICD-10-CM

## 2019-08-23 ENCOUNTER — HOSPITAL ENCOUNTER (OUTPATIENT)
Dept: NUCLEAR MEDICINE | Age: 69
Discharge: HOME | End: 2019-08-23
Payer: MEDICARE

## 2019-08-23 DIAGNOSIS — R10.9 FLANK PAIN: ICD-10-CM

## 2019-08-23 LAB — POC CREATININE WHOLE BLOOD: 0.7 MG/DL (ref 0.5–1.2)

## 2019-08-23 PROCEDURE — 2709999900 HC NON-CHARGEABLE SUPPLY

## 2019-08-23 PROCEDURE — A9562 TC99M MERTIATIDE: HCPCS | Performed by: NURSE PRACTITIONER

## 2019-08-23 PROCEDURE — 3430000000 HC RX DIAGNOSTIC RADIOPHARMACEUTICAL: Performed by: NURSE PRACTITIONER

## 2019-08-23 PROCEDURE — 6360000002 HC RX W HCPCS

## 2019-08-23 PROCEDURE — 78708 K FLOW/FUNCT IMAGE W/DRUG: CPT

## 2019-08-23 PROCEDURE — 82565 ASSAY OF CREATININE: CPT

## 2019-08-23 RX ORDER — FUROSEMIDE 10 MG/ML
40 INJECTION INTRAMUSCULAR; INTRAVENOUS ONCE
Status: COMPLETED | OUTPATIENT
Start: 2019-08-23 | End: 2019-08-23

## 2019-08-23 RX ADMIN — FUROSEMIDE 40 MG: 10 INJECTION INTRAMUSCULAR; INTRAVENOUS at 08:01

## 2019-08-23 RX ADMIN — Medication 10.2 MILLICURIE: at 07:51

## 2019-08-25 ENCOUNTER — TELEPHONE (OUTPATIENT)
Dept: UROLOGY | Age: 69
End: 2019-08-25

## 2019-08-25 NOTE — TELEPHONE ENCOUNTER
Please let pt know her renal lasix scan was negative for evidence of obstruction and stable with scan from 2017. The pain she was having was likely not urologic in origin. No need for urologic intervention at this time. F/u PRN.

## 2019-08-26 NOTE — TELEPHONE ENCOUNTER
The patient was advised of the message from [facility] CNP. Renal lasix scan was negative for evidence of obstruction and stable with scan from 2017. The pain she was having was likely not urologic in origin. No need for urologic intervention at this time. F/u PRN. She voiced understanding.

## 2019-09-03 ENCOUNTER — OFFICE VISIT (OUTPATIENT)
Dept: FAMILY MEDICINE CLINIC | Age: 69
End: 2019-09-03

## 2019-09-03 VITALS
BODY MASS INDEX: 27.2 KG/M2 | RESPIRATION RATE: 16 BRPM | HEIGHT: 66 IN | TEMPERATURE: 98.2 F | WEIGHT: 169.25 LBS | SYSTOLIC BLOOD PRESSURE: 130 MMHG | DIASTOLIC BLOOD PRESSURE: 80 MMHG | HEART RATE: 78 BPM

## 2019-09-03 DIAGNOSIS — J02.9 ACUTE PHARYNGITIS, UNSPECIFIED ETIOLOGY: ICD-10-CM

## 2019-09-03 DIAGNOSIS — K12.2 UVULITIS: Primary | ICD-10-CM

## 2019-09-03 PROCEDURE — 4040F PNEUMOC VAC/ADMIN/RCVD: CPT | Performed by: EMERGENCY MEDICINE

## 2019-09-03 PROCEDURE — G8427 DOCREV CUR MEDS BY ELIG CLIN: HCPCS | Performed by: EMERGENCY MEDICINE

## 2019-09-03 PROCEDURE — 1090F PRES/ABSN URINE INCON ASSESS: CPT | Performed by: EMERGENCY MEDICINE

## 2019-09-03 PROCEDURE — 99213 OFFICE O/P EST LOW 20 MIN: CPT | Performed by: EMERGENCY MEDICINE

## 2019-09-03 PROCEDURE — 1123F ACP DISCUSS/DSCN MKR DOCD: CPT | Performed by: EMERGENCY MEDICINE

## 2019-09-03 PROCEDURE — 1036F TOBACCO NON-USER: CPT | Performed by: EMERGENCY MEDICINE

## 2019-09-03 PROCEDURE — G8399 PT W/DXA RESULTS DOCUMENT: HCPCS | Performed by: EMERGENCY MEDICINE

## 2019-09-03 PROCEDURE — G8419 CALC BMI OUT NRM PARAM NOF/U: HCPCS | Performed by: EMERGENCY MEDICINE

## 2019-09-03 PROCEDURE — 3017F COLORECTAL CA SCREEN DOC REV: CPT | Performed by: EMERGENCY MEDICINE

## 2019-09-03 RX ORDER — AMOXICILLIN 500 MG/1
500 CAPSULE ORAL 3 TIMES DAILY
Qty: 30 CAPSULE | Refills: 0 | Status: SHIPPED | OUTPATIENT
Start: 2019-09-03 | End: 2019-09-13

## 2019-09-03 ASSESSMENT — ENCOUNTER SYMPTOMS
SINUS PRESSURE: 0
RHINORRHEA: 0
VOMITING: 0
WHEEZING: 0
NAUSEA: 0
SORE THROAT: 1
DIARRHEA: 0
VOICE CHANGE: 0
TROUBLE SWALLOWING: 0
CONSTIPATION: 0
BACK PAIN: 0
CHEST TIGHTNESS: 0
ABDOMINAL PAIN: 0
SHORTNESS OF BREATH: 0
COUGH: 1

## 2019-09-18 ENCOUNTER — HOSPITAL ENCOUNTER (OUTPATIENT)
Dept: WOMENS IMAGING | Age: 69
Discharge: HOME | End: 2019-09-18
Payer: MEDICARE

## 2019-09-18 DIAGNOSIS — Z12.31 VISIT FOR SCREENING MAMMOGRAM: ICD-10-CM

## 2019-09-18 PROCEDURE — 77063 BREAST TOMOSYNTHESIS BI: CPT

## 2019-09-30 RX ORDER — ATENOLOL AND CHLORTHALIDONE TABLET 50; 25 MG/1; MG/1
TABLET ORAL
Qty: 180 TABLET | Refills: 0 | Status: SHIPPED | OUTPATIENT
Start: 2019-09-30 | End: 2020-01-08 | Stop reason: SDUPTHER

## 2019-11-14 ENCOUNTER — TELEPHONE (OUTPATIENT)
Dept: FAMILY MEDICINE CLINIC | Age: 69
End: 2019-11-14

## 2019-11-14 DIAGNOSIS — I10 ESSENTIAL HYPERTENSION: Primary | ICD-10-CM

## 2019-11-14 DIAGNOSIS — R73.9 HYPERGLYCEMIA: ICD-10-CM

## 2019-11-21 LAB
ABSOLUTE BASO #: 0.1 X10E9/L (ref 0–0.9)
ABSOLUTE EOS #: 0.4 X10E9/L (ref 0–0.4)
ABSOLUTE LYMPH #: 3.3 X10E9/L (ref 1–3.5)
ABSOLUTE MONO #: 0.7 X10E9/L (ref 0–0.9)
ABSOLUTE NEUT #: 5.1 X10E9/L (ref 1.5–6.6)
ALBUMIN SERPL-MCNC: 4.5 G/DL (ref 3.2–5.3)
ALK PHOSPHATASE: 66 U/L (ref 39–130)
ALT SERPL-CCNC: 19 U/L (ref 0–31)
ANION GAP SERPL CALCULATED.3IONS-SCNC: 10 MMOL/L (ref 4–12)
AST SERPL-CCNC: 24 U/L (ref 0–41)
BASOPHILS RELATIVE PERCENT: 0.8 %
BILIRUB SERPL-MCNC: 0.5 MG/DL (ref 0.3–1.2)
BUN BLDV-MCNC: 18 MG/DL (ref 5–27)
CALCIUM SERPL-MCNC: 10.1 MG/DL (ref 8.5–10.5)
CHLORIDE BLD-SCNC: 97 MMOL/L (ref 98–109)
CHOLESTEROL/HDL RATIO: 3.4 (ref 1–5)
CHOLESTEROL: 193 MG/DL (ref 150–200)
CO2: 31 MMOL/L (ref 22–32)
CREAT SERPL-MCNC: 0.83 MG/DL (ref 0.4–1)
EGFR AFRICAN AMERICAN: >60 ML/MIN/1.73SQ.M
EGFR IF NONAFRICAN AMERICAN: >60 ML/MIN/1.73SQ.M
EOSINOPHILS RELATIVE PERCENT: 3.8 %
GLUCOSE: 102 MG/DL (ref 65–99)
HCT VFR BLD CALC: 39.3 % (ref 35–47)
HDLC SERPL-MCNC: 57 MG/DL
HEMOGLOBIN: 13.3 G/DL (ref 11.7–16.1)
LDL CHOLESTEROL CALCULATED: 112 MG/DL
LDL/HDL RATIO: 2
LYMPHOCYTE %: 34.6 %
MCH RBC QN AUTO: 30.9 PG (ref 27–35)
MCHC RBC AUTO-ENTMCNC: 33.9 G/DL (ref 31–36)
MCV RBC AUTO: 91 FL (ref 81–101)
MONOCYTES # BLD: 7.2 %
NEUTROPHILS RELATIVE PERCENT: 53.6 %
PDW BLD-RTO: 13.7 % (ref 11.5–14.7)
PLATELETS: 291 X10E9/L (ref 150–450)
PMV BLD AUTO: 7.9 FL (ref 7–12)
POTASSIUM SERPL-SCNC: 4 MMOL/L (ref 3.5–5)
RBC: 4.31 X10E12/L (ref 3.55–5.2)
SODIUM BLD-SCNC: 138 MMOL/L (ref 134–146)
TOTAL PROTEIN: 7.7 G/DL (ref 6–8)
TRIGL SERPL-MCNC: 119 MG/DL (ref 27–150)
TSH SERPL DL<=0.05 MIU/L-ACNC: 1.56 UIU/ML (ref 0.49–4.67)
VLDLC SERPL CALC-MCNC: 24 MG/DL (ref 0–30)
WBC: 9.4 X10E9/L (ref 4–11.8)

## 2019-11-25 ENCOUNTER — OFFICE VISIT (OUTPATIENT)
Dept: FAMILY MEDICINE CLINIC | Age: 69
End: 2019-11-25

## 2019-11-25 VITALS
WEIGHT: 167.5 LBS | HEIGHT: 66 IN | DIASTOLIC BLOOD PRESSURE: 86 MMHG | BODY MASS INDEX: 26.92 KG/M2 | SYSTOLIC BLOOD PRESSURE: 138 MMHG | HEART RATE: 72 BPM | RESPIRATION RATE: 16 BRPM

## 2019-11-25 DIAGNOSIS — K21.9 GASTROESOPHAGEAL REFLUX DISEASE, ESOPHAGITIS PRESENCE NOT SPECIFIED: ICD-10-CM

## 2019-11-25 DIAGNOSIS — I10 ESSENTIAL HYPERTENSION: Primary | ICD-10-CM

## 2019-11-25 PROCEDURE — G8417 CALC BMI ABV UP PARAM F/U: HCPCS | Performed by: FAMILY MEDICINE

## 2019-11-25 PROCEDURE — 1123F ACP DISCUSS/DSCN MKR DOCD: CPT | Performed by: FAMILY MEDICINE

## 2019-11-25 PROCEDURE — 4040F PNEUMOC VAC/ADMIN/RCVD: CPT | Performed by: FAMILY MEDICINE

## 2019-11-25 PROCEDURE — 1090F PRES/ABSN URINE INCON ASSESS: CPT | Performed by: FAMILY MEDICINE

## 2019-11-25 PROCEDURE — 99213 OFFICE O/P EST LOW 20 MIN: CPT | Performed by: FAMILY MEDICINE

## 2019-11-25 PROCEDURE — 3017F COLORECTAL CA SCREEN DOC REV: CPT | Performed by: FAMILY MEDICINE

## 2019-11-25 PROCEDURE — G8399 PT W/DXA RESULTS DOCUMENT: HCPCS | Performed by: FAMILY MEDICINE

## 2019-11-25 PROCEDURE — G8427 DOCREV CUR MEDS BY ELIG CLIN: HCPCS | Performed by: FAMILY MEDICINE

## 2019-11-25 PROCEDURE — 1036F TOBACCO NON-USER: CPT | Performed by: FAMILY MEDICINE

## 2019-11-25 RX ORDER — BACLOFEN 10 MG/1
TABLET ORAL
COMMUNITY
Start: 2019-10-09 | End: 2020-05-01

## 2019-11-25 ASSESSMENT — ENCOUNTER SYMPTOMS
SHORTNESS OF BREATH: 0
ABDOMINAL PAIN: 0
COUGH: 0
EYES NEGATIVE: 1
CONSTIPATION: 0
NAUSEA: 0
DIARRHEA: 0
CHEST TIGHTNESS: 0
VOMITING: 0

## 2020-01-08 NOTE — TELEPHONE ENCOUNTER
Pt stopped in req a #90 refill for Atenolol.     [facility]    ALSO     req a #10 day to     [facility]

## 2020-01-08 NOTE — TELEPHONE ENCOUNTER
Dangelo Gordon called requesting a refill of the below medication which has been pended for you:     Requested Prescriptions     Pending Prescriptions Disp Refills    atenolol-chlorthalidone (TENORETIC) 50-25 MG per tablet 180 tablet 1     Sig: TAKE 1 TABLET BY MOUTH TWICE DAILY    atenolol-chlorthalidone (TENORETIC 50) 50-25 MG per tablet 20 tablet 0     Sig: Take 1 tablet by mouth daily       Last Appointment Date: 11/25/2019  Next Appointment Date: 3/30/2020    Allergies   Allergen Reactions    Minocycline Swelling    Sulfa Antibiotics Swelling    Bactrim Diarrhea    Erythromycin Diarrhea    Nortriptyline Itching    Vicodin [Hydrocodone-Acetaminophen] Other (See Comments)     Headache.  severe

## 2020-01-10 RX ORDER — ATENOLOL AND CHLORTHALIDONE TABLET 50; 25 MG/1; MG/1
TABLET ORAL
Qty: 180 TABLET | Refills: 1 | Status: SHIPPED | OUTPATIENT
Start: 2020-01-10 | End: 2020-05-01 | Stop reason: SDUPTHER

## 2020-01-10 RX ORDER — ATENOLOL AND CHLORTHALIDONE TABLET 50; 25 MG/1; MG/1
1 TABLET ORAL DAILY
Qty: 20 TABLET | Refills: 0 | Status: SHIPPED | OUTPATIENT
Start: 2020-01-10 | End: 2020-05-01

## 2020-01-10 RX ORDER — ATENOLOL AND CHLORTHALIDONE TABLET 50; 25 MG/1; MG/1
TABLET ORAL
Qty: 180 TABLET | Refills: 0 | OUTPATIENT
Start: 2020-01-10

## 2020-01-10 NOTE — TELEPHONE ENCOUNTER
Date of last visit:  11/25/2019  Date of next visit:  3/30/2020    Requested Prescriptions     Pending Prescriptions Disp Refills    atenolol-chlorthalidone (TENORETIC) 50-25 MG per tablet 180 tablet 0     Sig: TAKE 1 TABLET BY MOUTH TWICE DAILY

## 2020-03-16 RX ORDER — LISINOPRIL 5 MG/1
TABLET ORAL
Qty: 90 TABLET | Refills: 0 | Status: SHIPPED | OUTPATIENT
Start: 2020-03-16 | End: 2020-05-01 | Stop reason: SDUPTHER

## 2020-03-25 PROBLEM — M16.12 PRIMARY OSTEOARTHRITIS OF LEFT HIP: Status: RESOLVED | Noted: 2017-12-11 | Resolved: 2020-03-24

## 2020-05-01 ENCOUNTER — VIRTUAL VISIT (OUTPATIENT)
Dept: FAMILY MEDICINE CLINIC | Age: 70
End: 2020-05-01

## 2020-05-01 PROCEDURE — 99213 OFFICE O/P EST LOW 20 MIN: CPT | Performed by: FAMILY MEDICINE

## 2020-05-01 PROCEDURE — 3017F COLORECTAL CA SCREEN DOC REV: CPT | Performed by: FAMILY MEDICINE

## 2020-05-01 PROCEDURE — 4040F PNEUMOC VAC/ADMIN/RCVD: CPT | Performed by: FAMILY MEDICINE

## 2020-05-01 PROCEDURE — 1123F ACP DISCUSS/DSCN MKR DOCD: CPT | Performed by: FAMILY MEDICINE

## 2020-05-01 PROCEDURE — G0438 PPPS, INITIAL VISIT: HCPCS | Performed by: FAMILY MEDICINE

## 2020-05-01 RX ORDER — TIZANIDINE 4 MG/1
8 TABLET ORAL NIGHTLY
COMMUNITY

## 2020-05-01 RX ORDER — LISINOPRIL 5 MG/1
TABLET ORAL
Qty: 90 TABLET | Refills: 1 | Status: SHIPPED | OUTPATIENT
Start: 2020-05-01 | End: 2020-09-21 | Stop reason: SDUPTHER

## 2020-05-01 RX ORDER — ATENOLOL AND CHLORTHALIDONE TABLET 50; 25 MG/1; MG/1
TABLET ORAL
Qty: 180 TABLET | Refills: 1 | Status: SHIPPED | OUTPATIENT
Start: 2020-05-01 | End: 2020-09-21 | Stop reason: SDUPTHER

## 2020-05-01 ASSESSMENT — PATIENT HEALTH QUESTIONNAIRE - PHQ9
SUM OF ALL RESPONSES TO PHQ QUESTIONS 1-9: 0
SUM OF ALL RESPONSES TO PHQ QUESTIONS 1-9: 0
SUM OF ALL RESPONSES TO PHQ9 QUESTIONS 1 & 2: 0
SUM OF ALL RESPONSES TO PHQ QUESTIONS 1-9: 0
1. LITTLE INTEREST OR PLEASURE IN DOING THINGS: 0
SUM OF ALL RESPONSES TO PHQ QUESTIONS 1-9: 0
2. FEELING DOWN, DEPRESSED OR HOPELESS: 0

## 2020-05-01 ASSESSMENT — ENCOUNTER SYMPTOMS
COUGH: 0
ABDOMINAL PAIN: 0
CONSTIPATION: 0
EYES NEGATIVE: 1
DIARRHEA: 0
SHORTNESS OF BREATH: 0
VOMITING: 0
NAUSEA: 0
CHEST TIGHTNESS: 0

## 2020-05-01 ASSESSMENT — LIFESTYLE VARIABLES: HOW OFTEN DO YOU HAVE A DRINK CONTAINING ALCOHOL: 0

## 2020-05-01 NOTE — PROGRESS NOTES
Used   Substance Use Topics    Alcohol use: No    Drug use: No       Past Medical History:   Diagnosis Date    Cervical radiculopathy     Degenerative arthritis of cervical spine     Degenerative lumbar disc     Fibromyalgia     GERD (gastroesophageal reflux disease)     Hydronephrosis 02/11/2016    right kidney    Hypertension     Osteoarthritis     neck,hands    Spinal stenosis     UPJ (ureteropelvic junction) obstruction 06/22/2002    Vitreous detachment of left eye 1996    Vitreous detachment of right eye 2005       Past Surgical History:   Procedure Laterality Date    BACK SURGERY  05/19/2017    L2-5 Decompression L2-5 posterior fusion and tlif by Dr Viola Domingo  08/30/2004    biopsy, Dr. Libby Siddiqui  02/07/2015    CARDIOVASCULAR STRESS TEST  2014    CATARACT REMOVAL WITH IMPLANT Left 04/25/2016    CATARACT REMOVAL WITH IMPLANT Right 05/09/2016    CERVICAL DISC SURGERY  01/18/2008    C5-6 discectomy, Dr. Maggie Park [facility] COLONOSCOPY  10/02 10/05 11/10   Levora Pal    [facility]. Roger Mccauley, [facility]    HIP ARTHROSCOPY Right 07/17/2014    labrum repair and PSCAS lengthening - Dr. Sawyer Collado in [facility]  08/18/2001    Dr. Margarette Tyler - [place] [facility] Right 12/16/2014    TOTAL HIP, DR. Farrah Florez - [facility]    JOINT REPLACEMENT Left 10/22/2018    LUMBAR One Arch Juan SURGERY  05/2000    L3-5 discectomy, [place]. [place] [id number]  01/2001    L3-4 with cage, Dr. Bhumi Barnes  08/23/2016    radiofrequency ablation right SI joint - Dr. Jacinto Rea Right 08/23/2016    right SI joint    Rickie Power Right 04/23/2012    Dr. Theresa Jensen Left 09/22/2014    Dr. Joshua Cardozo    [address] Left 12/11/2017    LEFT TOTAL HIP ARTHROPLASTY performed by Vicky Jenkins Tha Tran MD at [address] [place] [address]   2016    [address] [place] [address]  D/T ureteropelvic junction obstruction, hydronephrosis       Family History   Problem Relation Age of Onset    Heart Disease Mother     Arthritis Father     Kidney Disease Father     Other Father         MDS   79 y/o    Heart Disease Father 61        cabg    Cancer Father         kidney    Stroke Paternal Grandmother     Diabetes Paternal Grandmother     High Blood Pressure Sister     High Cholesterol Sister      Subjective:     Review of Systems   Constitutional: Negative for activity change, appetite change, diaphoresis, fatigue and fever. HENT: Negative. Eyes: Negative. Respiratory: Negative for cough, chest tightness and shortness of breath. Cardiovascular: Negative for chest pain, palpitations and leg swelling. Gastrointestinal: Negative for abdominal pain, constipation, diarrhea, nausea and vomiting. Genitourinary: Negative. Musculoskeletal: Positive for arthralgias. Skin: Negative. Negative for rash. Neurological: Negative for dizziness, syncope, weakness, light-headedness, numbness and headaches. Psychiatric/Behavioral: Negative.        :   There were no vitals taken for this visit. Wt Readings from Last 3 Encounters:   19 167 lb 8 oz (76 kg)   19 169 lb 4 oz (76.8 kg)   19 170 lb (77.1 kg)     Physical Exam  Constitutional:       Appearance: Normal appearance. Pulmonary:      Effort: Pulmonary effort is normal.   Neurological:      Mental Status: She is alert and oriented to person, place, and time. Psychiatric:         Mood and Affect: Mood normal.         Behavior: Behavior normal.       :       Diagnosis Orders   1. Essential hypertension     2. Spondylosis of cervical region without myelopathy or radiculopathy     3. Gastroesophageal reflux disease, esophagitis presence not specified     4.  Routine general medical examination at [facility] facility         :      Requested Prescriptions     Signed Prescriptions Disp Refills    lisinopril (PRINIVIL;ZESTRIL) 5 MG tablet 90 tablet 1     Sig: TAKE 1 TABLET DAILY    atenolol-chlorthalidone (TENORETIC) 50-25 MG per tablet 180 tablet 1     Sig: TAKE 1 TABLET BY MOUTH TWICE DAILY     Current Outpatient Medications   Medication Sig Dispense Refill    tiZANidine (ZANAFLEX) 4 MG tablet Take 4 mg by mouth every 6 hours as needed      lisinopril (PRINIVIL;ZESTRIL) 5 MG tablet TAKE 1 TABLET DAILY 90 tablet 1    atenolol-chlorthalidone (TENORETIC) 50-25 MG per tablet TAKE 1 TABLET BY MOUTH TWICE DAILY 180 tablet 1    aspirin 81 MG tablet Take 1 tablet by mouth daily 30 tablet 3    NONFORMULARY Take 2 tablets by mouth daily [facility]      Multiple Vitamin TABS Take by mouth every morning      CALCIUM CITRATE PO Take 1,260 mg by mouth 2 times daily       omeprazole (PRILOSEC) 20 MG capsule Take 1 capsule by mouth daily   2    Ascorbic Acid (VITAMIN C) 1000 MG tablet Take 1,000 mg by mouth daily        No current facility-administered medications for this visit. No orders of the defined types were placed in this encounter. Continue current medications. Return in 1 year (on 2021) for Medicare Annual Wellness Visit in 1 year. Telemedicine duration 15 minutes    Discussed Covid precautions. Discussed use, benefit, and side effects of prescribed medications. All patient questions answered. Pt voiced understanding. Instructed to continue current medications,diet and exercise. Patient agreed with treatment plan. Medicare Annual Wellness Visit  Name: Chico Cox Date: 2020   MRN: [id number] Sex: Female   Age: 78 y.o.  Ethnicity: Non-/Non    : 1950 Race: Rose Marie Negron is here for Check-Up and Hypertension    Screenings for behavioral, psychosocial and functional/safety risks, and cognitive dysfunction are all negative except as frequency per FRAX score)  Completed    Flu vaccine  Completed    Pneumococcal 65+ years Vaccine  Completed    Hepatitis A vaccine  Aged Out    Hepatitis B vaccine  Aged Out    Hib vaccine  Aged Out    Meningococcal (ACWY) vaccine  Aged Out     Recommendations for [employer] Due: see orders and patient instructions/AVS.  . Recommended screening schedule for the next 5-10 years is provided to the patient in written form: see Patient Jaycob Perez was seen today for check-up and hypertension.     Diagnoses and all orders for this visit:    Essential hypertension    Spondylosis of cervical region without myelopathy or radiculopathy    Gastroesophageal reflux disease, esophagitis presence not specified    Routine general medical examination at a health care facility    Other orders  -     lisinopril (PRINIVIL;ZESTRIL) 5 MG tablet; TAKE 1 TABLET DAILY  -     atenolol-chlorthalidone (TENORETIC) 50-25 MG per tablet; TAKE 1 TABLET BY MOUTH TWICE DAILY

## 2020-05-01 NOTE — PATIENT INSTRUCTIONS
Personalized Preventive Plan for Edra Carry - 5/1/2020  Medicare offers a range of preventive health benefits. Some of the tests and screenings are paid in full while other may be subject to a deductible, co-insurance, and/or copay. Some of these benefits include a comprehensive review of your medical history including lifestyle, illnesses that may run in your family, and various assessments and screenings as appropriate. After reviewing your medical record and screening and assessments performed today your provider may have ordered immunizations, labs, imaging, and/or referrals for you. A list of these orders (if applicable) as well as your Preventive Care list are included within your After Visit Summary for your review. Other Preventive Recommendations:    · A preventive eye exam performed by an eye specialist is recommended every 1-2 years to screen for glaucoma; cataracts, macular degeneration, and other eye disorders. · A preventive dental visit is recommended every 6 months. · Try to get at least 150 minutes of exercise per week or 10,000 steps per day on a pedometer . · Order or download the FREE \"Exercise & Physical Activity: Your Everyday Guide\" from The [employer]. Call 0-873.850.3793 or search The [employer] online. · You need 7791-3601 mg of calcium and 5989-0512 IU of vitamin D per day. It is possible to meet your calcium requirement with diet alone, but a vitamin D supplement is usually necessary to meet this goal.  · When exposed to the sun, use a sunscreen that protects against both UVA and UVB radiation with an SPF of 30 or greater. Reapply every 2 to 3 hours or after sweating, drying off with a towel, or swimming. · Always wear a seat belt when traveling in a car. Always wear a helmet when riding a bicycle or motorcycle.     DASH Diet: After Your Visit  Your Care Instructions  The DASH diet is an eating plan that can help lower your blood older, changes in balance, gait, strength, vision, hearing, and cognition make even the most youthful senior more prone to accidents. Falls are one of the leading health risks for older people. This increased risk of falling is related to:   Aging process (eg, decreased muscle strength, slowed reflexes)   Higher incidence of chronic health problems (eg, arthritis, diabetes) that may limit mobility, agility or sensory awareness   Side effects of medicine (eg, dizziness, blurred vision)especially medicines like prescription pain medicines and drugs used to treat mental health conditions   Depending on the brittleness of your bones, the consequences of a fall can be serious and long lasting. Home Life   Research by the Association of Aging [facility]) shows that some home accidents among older adults can be prevented by making simple lifestyle changes and basic modifications and repairs to the home environment. Here are some lifestyle changes that experts recommend:   Have your hearing and vision checked regularly. Be sure to wear prescription glasses that are right for you. Speak to your doctor or pharmacist about the possible side effects of your medicines. A number of medicines can cause dizziness. If you have problems with sleep, talk to your doctor. Limit your intake of alcohol. If necessary, use a cane or walker to help maintain your balance. Wear supportive, rubber-soled shoes, even at home. If you live in a region that gets wintry weather, you may want to put special cleats on your shoes to prevent you from slipping on the snow and ice. Exercise regularly to help maintain muscle tone, agility, and balance. Always hold the banister when going up or down stairs. Also, use  bars when getting in or out of the bath or shower, or using the toilet. To avoid dizziness, get up slowly from a lying down position. Sit up first, dangling your legs for a minute or two before rising to a standing position. Overall Home Safety Check   According to the Consumer Product Safety Commision's \"Older Consumer Home Safety Checklist,\" it is important to check for potential hazards in each room. And remember, proper lighting is an essential factor in home safety. If you cannot see clearly, you are more likely to fall. Important questions to ask yourself include:   Are lamp, electric, extension, and telephone cords placed out of the flow of traffic and maintained in good condition? Have frayed cords been replaced? Are all small rugs and runners slip resistant? If not, you can secure them to the floor with a special double-sided carpet tape. Are smoke detectors properly locatedone on every floor of your home and one outside of every sleeping area? Are they in good working order? Are batteries replaced at least once a year? Do you have a well-maintained carbon monoxide detector outside every sleeping are in your home? Does your furniture layout leave plenty of space to maneuver between and around chairs, tables, beds, and sofas? Are hallways, stairs and passages between rooms well lit? Can you reach a lamp without getting out of bed? Are floor surfaces well maintained? Shag rugs, high-pile carpeting, tile floors, and polished wood floors can be particularly slippery. Stairs should always have handrails and be carpeted or fitted with a non-skid tread. Is your telephone easily reachable. Is the cord safely tucked away? Room by Room   According to the Association of Aging, bathrooms and tiffanie are the two most potentially hazardous rooms in your home. In the Kitchen    Be sure your stove is in proper working order and always make sure burners and the oven are off before you go out or go to sleep. Keep pots on the back burners, turn handles away from the front of the stove, and keep stove clean and free of grease build-up. Kitchen ventilation systems and range exhausts should be working properly.     Keep includes a small pendant that connects directly to an emergency medical voice-response system. You should also make arrangements to stay in contact with someonefriend, neighbor, family memberon a regular schedule. Fire Prevention   According to the [employer]. (Smoke Alarms for Every) [facility], senior citizens are one of the two highest risk groups for death and serious injuries due to residential fires. When cooking, wear short-sleeved items, never a bulky long-sleeved robe. The [facility]'s Safety Checklist for Older Consumers emphasizes the importance of checking basements, garages, workshops and storage areas for fire hazards, such as volatile liquids, piles of old rags or clothing and overloaded circuits. Never smoke in bed or when lying down on a couch or recliner chair. Small portable electric or kerosene heaters are responsible for many home fires and should be used cautiously if at all. If you do use one, be sure to keep them away from flammable materials. In case of fire, make sure you have a pre-established emergency exit plan. Have a professional check your fireplace and other fuel-burning appliances yearly. [employer]   Baby boomers entering the ramirez years will continue to see the development of new products to help older adults live safely and independently in spite of age-related changes. Making Life More Livable  , by Srikanth Ayala, lists over 1,000 products for \"living well in the mature years,\" such as bathing and mobility aids, household security devices, ergonomically designed knives and peelers, and faucet valves and knobs for temperature control. Medical supply stores and organizations are good sources of information about products that improve your quality of life and insure your safety.      Last Reviewed: November 2009 Patsy Yancey MD   Updated: 3/7/2011     ·

## 2020-05-27 ENCOUNTER — TELEPHONE (OUTPATIENT)
Dept: FAMILY MEDICINE CLINIC | Age: 70
End: 2020-05-27

## 2020-05-27 NOTE — TELEPHONE ENCOUNTER
Nasima from Dr Zambrano [facility] office called req preop op for right shoulder rotator cuff revision scheduled 6-15-20. All testing ordered.     Please call April at   [phone]

## 2020-09-02 ENCOUNTER — OFFICE VISIT (OUTPATIENT)
Dept: FAMILY MEDICINE CLINIC | Age: 70
End: 2020-09-02

## 2020-09-02 VITALS
TEMPERATURE: 96.7 F | BODY MASS INDEX: 26.88 KG/M2 | DIASTOLIC BLOOD PRESSURE: 66 MMHG | HEART RATE: 76 BPM | SYSTOLIC BLOOD PRESSURE: 134 MMHG | HEIGHT: 66 IN | WEIGHT: 167.25 LBS | RESPIRATION RATE: 16 BRPM

## 2020-09-02 PROCEDURE — 4040F PNEUMOC VAC/ADMIN/RCVD: CPT | Performed by: FAMILY MEDICINE

## 2020-09-02 PROCEDURE — 1036F TOBACCO NON-USER: CPT | Performed by: FAMILY MEDICINE

## 2020-09-02 PROCEDURE — G8399 PT W/DXA RESULTS DOCUMENT: HCPCS | Performed by: FAMILY MEDICINE

## 2020-09-02 PROCEDURE — 1090F PRES/ABSN URINE INCON ASSESS: CPT | Performed by: FAMILY MEDICINE

## 2020-09-02 PROCEDURE — 1123F ACP DISCUSS/DSCN MKR DOCD: CPT | Performed by: FAMILY MEDICINE

## 2020-09-02 PROCEDURE — G8427 DOCREV CUR MEDS BY ELIG CLIN: HCPCS | Performed by: FAMILY MEDICINE

## 2020-09-02 PROCEDURE — G8417 CALC BMI ABV UP PARAM F/U: HCPCS | Performed by: FAMILY MEDICINE

## 2020-09-02 PROCEDURE — 3017F COLORECTAL CA SCREEN DOC REV: CPT | Performed by: FAMILY MEDICINE

## 2020-09-02 PROCEDURE — 99213 OFFICE O/P EST LOW 20 MIN: CPT | Performed by: FAMILY MEDICINE

## 2020-09-02 RX ORDER — TRAMADOL HYDROCHLORIDE 50 MG/1
TABLET ORAL
COMMUNITY
Start: 2020-08-11 | End: 2020-11-16 | Stop reason: ALTCHOICE

## 2020-09-02 ASSESSMENT — ENCOUNTER SYMPTOMS
NAUSEA: 0
EYES NEGATIVE: 1
COUGH: 0
DIARRHEA: 0
VOMITING: 0
CONSTIPATION: 0
SHORTNESS OF BREATH: 0
ABDOMINAL PAIN: 0
CHEST TIGHTNESS: 0

## 2020-09-02 NOTE — PROGRESS NOTES
and PSCAS lengthening - Dr. Neena Martins in [facility]  2001    Dr. Dirk Patel - [place] Right 2014    TOTAL HIP, DR. Trudy Blood - [facility]    JOINT REPLACEMENT Left 10/22/2018    LUMBAR One Arch Juan SURGERY  2000    L3-5 discectomy, Dr. Alexx Barton, [facility]  2001    L3-4 with cage, Dr. Josh Hernandez - [place]  2016    radiofrequency ablation right SI joint - Dr. Evans Rad Right 2016    right SI joint    [place] Right 2012    Dr. Libby Lee Left 2014    Dr. Markus Hopkins    [address] Left 2017    LEFT TOTAL HIP ARTHROPLASTY performed by Heather Holloway MD at [address] [place] [address] DrLeticia  2016    [facility]  D/T ureteropelvic junction obstruction, hydronephrosis       Family History   Problem Relation Age of Onset    Heart Disease Mother     Arthritis Father     Kidney Disease Father     Other Father         MDS   79 y/o    Heart Disease Father 61        cabg    Cancer Father         kidney    Stroke Paternal Grandmother     Diabetes Paternal Grandmother     High Blood Pressure Sister     High Cholesterol Sister      Subjective:     Review of Systems   Constitutional: Negative for activity change, appetite change, diaphoresis, fatigue and fever. HENT: Negative. Eyes: Negative. Respiratory: Negative for cough, chest tightness and shortness of breath. Cardiovascular: Negative for chest pain, palpitations and leg swelling. Gastrointestinal: Negative for abdominal pain, constipation, diarrhea, nausea and vomiting. Genitourinary: Negative. Musculoskeletal: Positive for arthralgias. Skin: Negative. Negative for rash.    Neurological: Negative for dizziness, syncope, weakness, light-headedness, numbness and headaches. Psychiatric/Behavioral: Negative.        :   /66 (Site: Left Upper Arm, Position: Sitting, Cuff Size: Medium Adult)   Pulse 76   Temp 96.7 °F (35.9 °C) (Skin)   Resp 16   Ht 5' 6\" (1.676 m)   Wt 167 lb 4 oz (75.9 kg)   BMI 26.99 kg/m²   Wt Readings from Last 3 Encounters:   09/02/20 167 lb 4 oz (75.9 kg)   11/25/19 167 lb 8 oz (76 kg)   09/03/19 169 lb 4 oz (76.8 kg)     Physical Exam  Vitals signs and nursing note reviewed. Constitutional:       General: She is not in acute distress. Appearance: She is well-developed. She is not diaphoretic. HENT:      Head: Normocephalic and atraumatic. Eyes:      General: No scleral icterus. Right eye: No discharge. Left eye: No discharge. Conjunctiva/sclera: Conjunctivae normal.      Pupils: Pupils are equal, round, and reactive to light. Neck:      Musculoskeletal: Normal range of motion and neck supple. Thyroid: No thyromegaly. Vascular: No JVD. Cardiovascular:      Rate and Rhythm: Normal rate and regular rhythm. Heart sounds: Normal heart sounds. No murmur. Pulmonary:      Effort: No respiratory distress. Breath sounds: Normal breath sounds. No wheezing, rhonchi or rales. Abdominal:      General: Bowel sounds are normal. There is no distension. Palpations: Abdomen is soft. There is no mass. Tenderness: There is no abdominal tenderness. There is no guarding or rebound. Musculoskeletal: Normal range of motion. Lymphadenopathy:      Cervical: No cervical adenopathy. Skin:     General: Skin is warm and dry. Findings: No rash. Neurological:      Mental Status: She is alert and oriented to person, place, and time. Psychiatric:         Behavior: Behavior normal.       :       Diagnosis Orders   1. Essential hypertension     2.  Gastroesophageal reflux disease, esophagitis presence not specified         :      Requested Prescriptions      No prescriptions requested or ordered

## 2020-09-21 RX ORDER — ATENOLOL AND CHLORTHALIDONE TABLET 50; 25 MG/1; MG/1
TABLET ORAL
Qty: 180 TABLET | Refills: 1 | Status: SHIPPED | OUTPATIENT
Start: 2020-09-21 | End: 2021-02-01

## 2020-09-21 RX ORDER — LISINOPRIL 5 MG/1
TABLET ORAL
Qty: 90 TABLET | Refills: 1 | Status: SHIPPED | OUTPATIENT
Start: 2020-09-21 | End: 2020-11-16 | Stop reason: SINTOL

## 2020-09-21 NOTE — TELEPHONE ENCOUNTER
[facility] called req ref of Lisinopril and Atenolol.     Last appt       9/2/20    Next appt       1/4/21

## 2020-11-16 ENCOUNTER — OFFICE VISIT (OUTPATIENT)
Dept: FAMILY MEDICINE CLINIC | Age: 70
End: 2020-11-16

## 2020-11-16 ENCOUNTER — HOSPITAL ENCOUNTER (OUTPATIENT)
Age: 70
Discharge: HOME | End: 2020-11-16
Payer: MEDICARE

## 2020-11-16 ENCOUNTER — HOSPITAL ENCOUNTER (OUTPATIENT)
Dept: GENERAL RADIOLOGY | Age: 70
Discharge: HOME | End: 2020-11-16
Payer: MEDICARE

## 2020-11-16 VITALS
HEIGHT: 66 IN | TEMPERATURE: 96.3 F | BODY MASS INDEX: 27.2 KG/M2 | DIASTOLIC BLOOD PRESSURE: 76 MMHG | RESPIRATION RATE: 16 BRPM | SYSTOLIC BLOOD PRESSURE: 132 MMHG | HEART RATE: 72 BPM | WEIGHT: 169.25 LBS

## 2020-11-16 PROCEDURE — G8417 CALC BMI ABV UP PARAM F/U: HCPCS | Performed by: FAMILY MEDICINE

## 2020-11-16 PROCEDURE — G8427 DOCREV CUR MEDS BY ELIG CLIN: HCPCS | Performed by: FAMILY MEDICINE

## 2020-11-16 PROCEDURE — 4040F PNEUMOC VAC/ADMIN/RCVD: CPT | Performed by: FAMILY MEDICINE

## 2020-11-16 PROCEDURE — 3017F COLORECTAL CA SCREEN DOC REV: CPT | Performed by: FAMILY MEDICINE

## 2020-11-16 PROCEDURE — 1036F TOBACCO NON-USER: CPT | Performed by: FAMILY MEDICINE

## 2020-11-16 PROCEDURE — 99213 OFFICE O/P EST LOW 20 MIN: CPT | Performed by: FAMILY MEDICINE

## 2020-11-16 PROCEDURE — G8399 PT W/DXA RESULTS DOCUMENT: HCPCS | Performed by: FAMILY MEDICINE

## 2020-11-16 PROCEDURE — 1090F PRES/ABSN URINE INCON ASSESS: CPT | Performed by: FAMILY MEDICINE

## 2020-11-16 PROCEDURE — 71046 X-RAY EXAM CHEST 2 VIEWS: CPT

## 2020-11-16 PROCEDURE — 1123F ACP DISCUSS/DSCN MKR DOCD: CPT | Performed by: FAMILY MEDICINE

## 2020-11-16 RX ORDER — LOSARTAN POTASSIUM 50 MG/1
50 TABLET ORAL DAILY
Qty: 30 TABLET | Refills: 1 | Status: SHIPPED | OUTPATIENT
Start: 2020-11-16 | End: 2020-12-14 | Stop reason: SDUPTHER

## 2020-11-16 RX ORDER — KETOCONAZOLE 20 MG/G
CREAM TOPICAL
COMMUNITY
Start: 2020-10-13 | End: 2022-05-04 | Stop reason: ALTCHOICE

## 2020-11-16 ASSESSMENT — ENCOUNTER SYMPTOMS
CONSTIPATION: 0
COUGH: 1
DIARRHEA: 0
SHORTNESS OF BREATH: 0
ABDOMINAL PAIN: 0
EYES NEGATIVE: 1
CHEST TIGHTNESS: 0
NAUSEA: 0
VOMITING: 0

## 2020-11-16 NOTE — PROGRESS NOTES
Date: 11/16/2020    Yannick Aguilar is a 69 y.o. female who presents today for:  Chief Complaint   Patient presents with    Discuss Medications     Lisinopril he states that she's been having problems with cough and her daughter. Patient was concerned that it could be from her lisinopril. It is Dry Nonproductive Cough       HPI:     HPI    has a current medication list which includes the following prescription(s): losartan, atenolol-chlorthalidone, tizanidine, aspirin, NONFORMULARY, multiple vitamin, calcium citrate, omeprazole, vitamin c, and ketoconazole.     Allergies   Allergen Reactions    Minocycline Swelling    Sulfa Antibiotics Swelling    Bactrim Diarrhea    Erythromycin Diarrhea    Nortriptyline Itching    Vicodin [Hydrocodone-Acetaminophen] Other (See Comments)     Headache.  severe       Social History     Tobacco Use    Smoking status: Never Smoker    Smokeless tobacco: Never Used   Substance Use Topics    Alcohol use: No    Drug use: No       Past Medical History:   Diagnosis Date    Cervical radiculopathy     Degenerative arthritis of cervical spine     Degenerative lumbar disc     Fibromyalgia     GERD (gastroesophageal reflux disease)     Hydronephrosis 02/11/2016    right kidney    Hypertension     Osteoarthritis     neck,hands    Spinal stenosis     UPJ (ureteropelvic junction) obstruction 06/22/2002    Vitreous detachment of left eye 1996    Vitreous detachment of right eye 2005       Past Surgical History:   Procedure Laterality Date    BACK SURGERY  05/19/2017    L2-5 Decompression L2-5 posterior fusion and tlif by Dr Tari Su  08/30/2004    biopsy, Dr. Anita Yepez  02/07/2015    CARDIOVASCULAR STRESS TEST  2014    CATARACT REMOVAL WITH IMPLANT Left 04/25/2016    CATARACT REMOVAL WITH IMPLANT Right 05/09/2016    CERVICAL [facility] [address] [facility] SURGERY  01/18/2008    C5-6 discectomy, Dr. Jan Champion -[facility] COLONOSCOPY  10/02 10/05 11/10 Chana Hawkins, [facility]    HIP ARTHROSCOPY Right 2014    labrum repair and PSCAS lengthening - Dr. Madiha Hall in [facility]  2001    Dr. Sami Koch - Karen Jolene Right 2014    TOTAL HIP, DR. Patsy Swain - [facility]    JOINT REPLACEMENT Left 10/22/2018    LUMBAR [address] SURGERY  2000    L3-5 discectomy, Dr. Hal Fulton, [facility] [address]  2001    L3-4 with cage, Dr. Fanta Hopkins - Angela Idol  2016    radiofrequency ablation right SI joint - Dr. Deloris Tariq Right 2016    right SI joint    [address] Right 2012    Dr. Andre Edmonds Left 2014    Dr. Sudeep Mayes    [address] Left 2017    LEFT TOTAL HIP ARTHROPLASTY performed by Nolan Singer MD at [address] [place] [address] DrLeticia  2016    [facility]  D/T ureteropelvic junction obstruction, hydronephrosis       Family History   Problem Relation Age of Onset    Heart Disease Mother     Arthritis Father     Kidney Disease Father     Other Father         MDS   79 y/o    Heart Disease Father 61        cabg    Cancer Father         kidney    Stroke Paternal Grandmother     Diabetes Paternal Grandmother     High Blood Pressure Sister     High Cholesterol Sister      Subjective:     Review of Systems   Constitutional: Negative for activity change, appetite change, diaphoresis, fatigue and fever. HENT: Negative. Eyes: Negative. Respiratory: Positive for cough (dry non productive since early October. ). Negative for chest tightness and shortness of breath. Cardiovascular: Negative for chest pain, palpitations and leg swelling. Gastrointestinal: Negative for abdominal pain, constipation, diarrhea, nausea and vomiting. Genitourinary: Negative. Musculoskeletal: Negative. Skin: Negative. Negative for rash. Neurological: Negative for dizziness, syncope, weakness, light-headedness, numbness and headaches. Psychiatric/Behavioral: Negative.        :   /76 (Site: Right Upper Arm, Position: Sitting, Cuff Size: Medium Adult)   Pulse 72   Temp 96.3 °F (35.7 °C) (Skin)   Resp 16   Ht 5' 6\" (1.676 m)   Wt 169 lb 4 oz (76.8 kg)   BMI 27.32 kg/m²   Wt Readings from Last 3 Encounters:   11/16/20 169 lb 4 oz (76.8 kg)   09/02/20 167 lb 4 oz (75.9 kg)   11/25/19 167 lb 8 oz (76 kg)     Physical Exam  Vitals signs and nursing note reviewed. Constitutional:       General: She is not in acute distress. Appearance: She is well-developed. She is not diaphoretic. HENT:      Head: Normocephalic and atraumatic. Eyes:      General: No scleral icterus. Right eye: No discharge. Left eye: No discharge. Conjunctiva/sclera: Conjunctivae normal.      Pupils: Pupils are equal, round, and reactive to light. Neck:      Musculoskeletal: Normal range of motion and neck supple. Thyroid: No thyromegaly. Vascular: No JVD. Cardiovascular:      Rate and Rhythm: Normal rate and regular rhythm. Heart sounds: Normal heart sounds. No murmur. Pulmonary:      Effort: No respiratory distress. Breath sounds: Normal breath sounds. No wheezing, rhonchi or rales. Abdominal:      General: Bowel sounds are normal. There is no distension. Palpations: Abdomen is soft. There is no mass. Tenderness: There is no abdominal tenderness. There is no guarding or rebound. Musculoskeletal: Normal range of motion. Lymphadenopathy:      Cervical: No cervical adenopathy. Skin:     General: Skin is warm and dry. Findings: No rash. Neurological:      Mental Status: She is alert and oriented to person, place, and time. Psychiatric:         Behavior: Behavior normal.       :       Diagnosis Orders   1.  Cough  XR CHEST (2 VW) 2. Essential hypertension  losartan (COZAAR) 50 MG tablet       :      Requested Prescriptions     Signed Prescriptions Disp Refills    losartan (COZAAR) 50 MG tablet 30 tablet 1     Sig: Take 1 tablet by mouth daily     Current Outpatient Medications   Medication Sig Dispense Refill    losartan (COZAAR) 50 MG tablet Take 1 tablet by mouth daily 30 tablet 1    atenolol-chlorthalidone (TENORETIC) 50-25 MG per tablet TAKE 1 TABLET BY MOUTH TWICE DAILY 180 tablet 1    tiZANidine (ZANAFLEX) 4 MG tablet Take 4 mg by mouth every 6 hours as needed      aspirin 81 MG tablet Take 1 tablet by mouth daily 30 tablet 3    NONFORMULARY Take 2 tablets by mouth daily Eye promise      Multiple Vitamin TABS Take by mouth every morning      CALCIUM CITRATE PO Take 1,260 mg by mouth 2 times daily       omeprazole (PRILOSEC) 20 MG capsule Take 1 capsule by mouth daily   2    Ascorbic Acid (VITAMIN C) 1000 MG tablet Take 1,000 mg by mouth daily       ketoconazole (NIZORAL) 2 % cream APPLY 1 APPLICATION TOPICALLY TO RIGHT FOOT TWICE DAILY FOR 4 WEEKS THEN A FEW DAYS PER WEEK AS PREVENTION. No current facility-administered medications for this visit. Orders Placed This Encounter   Procedures    XR CHEST (2 VW)     Standing Status:   Future     Standing Expiration Date:   11/16/2021       Continue current medications. Discharge continue lisinopril and start losartan. Return in about 4 weeks (around 12/14/2020) for cough. Discussed use, benefit, and side effects of prescribed medications. All patient questions answered. Pt voiced understanding. Patient agreed with treatment plan.

## 2020-12-14 ENCOUNTER — OFFICE VISIT (OUTPATIENT)
Dept: FAMILY MEDICINE CLINIC | Age: 70
End: 2020-12-14

## 2020-12-14 VITALS
RESPIRATION RATE: 16 BRPM | TEMPERATURE: 97.2 F | HEART RATE: 80 BPM | DIASTOLIC BLOOD PRESSURE: 68 MMHG | BODY MASS INDEX: 27.66 KG/M2 | WEIGHT: 172.13 LBS | HEIGHT: 66 IN | SYSTOLIC BLOOD PRESSURE: 130 MMHG

## 2020-12-14 PROCEDURE — G8417 CALC BMI ABV UP PARAM F/U: HCPCS | Performed by: FAMILY MEDICINE

## 2020-12-14 PROCEDURE — 1090F PRES/ABSN URINE INCON ASSESS: CPT | Performed by: FAMILY MEDICINE

## 2020-12-14 PROCEDURE — G8427 DOCREV CUR MEDS BY ELIG CLIN: HCPCS | Performed by: FAMILY MEDICINE

## 2020-12-14 PROCEDURE — G8399 PT W/DXA RESULTS DOCUMENT: HCPCS | Performed by: FAMILY MEDICINE

## 2020-12-14 PROCEDURE — 3017F COLORECTAL CA SCREEN DOC REV: CPT | Performed by: FAMILY MEDICINE

## 2020-12-14 PROCEDURE — 1123F ACP DISCUSS/DSCN MKR DOCD: CPT | Performed by: FAMILY MEDICINE

## 2020-12-14 PROCEDURE — 1036F TOBACCO NON-USER: CPT | Performed by: FAMILY MEDICINE

## 2020-12-14 PROCEDURE — 99213 OFFICE O/P EST LOW 20 MIN: CPT | Performed by: FAMILY MEDICINE

## 2020-12-14 PROCEDURE — 4040F PNEUMOC VAC/ADMIN/RCVD: CPT | Performed by: FAMILY MEDICINE

## 2020-12-14 RX ORDER — LOSARTAN POTASSIUM 50 MG/1
50 TABLET ORAL DAILY
Qty: 30 TABLET | Refills: 0 | Status: SHIPPED | OUTPATIENT
Start: 2020-12-14 | End: 2020-12-16

## 2020-12-14 RX ORDER — LOSARTAN POTASSIUM 50 MG/1
50 TABLET ORAL DAILY
Qty: 90 TABLET | Refills: 1 | Status: SHIPPED | OUTPATIENT
Start: 2020-12-14 | End: 2020-12-16 | Stop reason: SDUPTHER

## 2020-12-14 ASSESSMENT — ENCOUNTER SYMPTOMS
SHORTNESS OF BREATH: 0
NAUSEA: 0
CONSTIPATION: 0
ABDOMINAL PAIN: 0
DIARRHEA: 0
VOMITING: 0
EYES NEGATIVE: 1
COUGH: 0
CHEST TIGHTNESS: 0

## 2020-12-14 NOTE — PROGRESS NOTES
Date: 12/14/2020    Michael Nair is a 69 y.o. female who presents today for:  Chief Complaint   Patient presents with    Check-Up    Hypertension stable. She states that about 1 1/2 weeks after we stopped her ACE her cough went away. HPI:     HPI    has a current medication list which includes the following prescription(s): losartan, losartan, ketoconazole, atenolol-chlorthalidone, tizanidine, aspirin, NONFORMULARY, multiple vitamin, calcium citrate, omeprazole, and vitamin c.     Allergies   Allergen Reactions    Minocycline Swelling    Sulfa Antibiotics Swelling    Bactrim Diarrhea    Erythromycin Diarrhea    Nortriptyline Itching    Vicodin [Hydrocodone-Acetaminophen] Other (See Comments)     Headache.  severe       Social History     Tobacco Use    Smoking status: Never Smoker    Smokeless tobacco: Never Used   Substance Use Topics    Alcohol use: No    Drug use: No       Past Medical History:   Diagnosis Date    Cervical radiculopathy     Degenerative arthritis of cervical spine     Degenerative lumbar disc     Fibromyalgia     GERD (gastroesophageal reflux disease)     Hydronephrosis 02/11/2016    right kidney    Hypertension     Osteoarthritis     neck,hands    Spinal stenosis     UPJ (ureteropelvic junction) obstruction 06/22/2002    Vitreous detachment of left eye 1996    Vitreous detachment of right eye 2005       Past Surgical History:   Procedure Laterality Date    BACK SURGERY  05/19/2017    L2-5 Decompression L2-5 posterior fusion and tlif by Dr Jodie Krueger  08/30/2004    biopsy, Dr. Carolyn Proctor Second  02/07/2015    CARDIOVASCULAR STRESS TEST  2014    CATARACT REMOVAL WITH IMPLANT Left 04/25/2016    CATARACT REMOVAL WITH IMPLANT Right 05/09/2016    CERVICAL [address] SURGERY  01/18/2008    C5-6 discectomy, Dr. Santi Kowalski  10/02 10/05 11/10   [facility]ra Levi, [facility]  HIP ARTHROSCOPY Right 2014    labrum repair and PSCAS lengthening - Dr. Cynthia Lancaster in [facility]  2001    Dr. Jimmy De La Torre - [place] Drew Right 2014    TOTAL HIP, DR. Mary Grace Carranza - [facility]    JOINT REPLACEMENT Left 10/22/2018    LUMBAR [address] SURGERY  2000    L3-5 discectomy, Dr. Moses Jones, [facility] [address]  2001    L3-4 with cage, Dr. Marylene Schlatter - [place] Antu  2016    radiofrequency ablation right SI joint - Dr. Meg Beck Right 2016    right SI joint    [place]iza Ano Right 2012    Dr. Dain Cervantes Left 2014    Dr. Jasmeet Garcia    [address] Left 2017    LEFT TOTAL HIP ARTHROPLASTY performed by Jesse Mcmullen MD at [address] [place] [address]   2016    [facility]  D/T ureteropelvic junction obstruction, hydronephrosis       Family History   Problem Relation Age of Onset    Heart Disease Mother     Arthritis Father     Kidney Disease Father     Other Father         MDS   79 y/o    Heart Disease Father 61        cabg    Cancer Father         kidney    Stroke Paternal Grandmother     Diabetes Paternal Grandmother     High Blood Pressure Sister     High Cholesterol Sister      Subjective:     Review of Systems   Constitutional: Negative for activity change, appetite change, diaphoresis, fatigue and fever. HENT: Negative. Eyes: Negative. Respiratory: Negative for cough (her cough is gone), chest tightness and shortness of breath. Cardiovascular: Negative for chest pain, palpitations and leg swelling. Gastrointestinal: Negative for abdominal pain, constipation, diarrhea, nausea and vomiting. Genitourinary: Negative. Musculoskeletal: Negative. Skin: Negative. Negative for rash. Neurological: Negative for dizziness, syncope, weakness, light-headedness, numbness and headaches. Psychiatric/Behavioral: Negative.        :   /68 (Site: Left Upper Arm, Position: Sitting, Cuff Size: Medium Adult)   Pulse 80   Temp 97.2 °F (36.2 °C) (Skin)   Resp 16   Ht 5' 6\" (1.676 m)   Wt 172 lb 2 oz (78.1 kg)   BMI 27.78 kg/m²   Wt Readings from Last 3 Encounters:   12/14/20 172 lb 2 oz (78.1 kg)   11/16/20 169 lb 4 oz (76.8 kg)   09/02/20 167 lb 4 oz (75.9 kg)     Physical Exam  Vitals signs and nursing note reviewed. Constitutional:       General: She is not in acute distress. Appearance: She is well-developed. She is not diaphoretic. HENT:      Head: Normocephalic and atraumatic. Eyes:      General: No scleral icterus. Right eye: No discharge. Left eye: No discharge. Conjunctiva/sclera: Conjunctivae normal.      Pupils: Pupils are equal, round, and reactive to light. Neck:      Musculoskeletal: Normal range of motion and neck supple. Thyroid: No thyromegaly. Vascular: No JVD. Cardiovascular:      Rate and Rhythm: Normal rate and regular rhythm. Heart sounds: Normal heart sounds. No murmur. Pulmonary:      Effort: No respiratory distress. Breath sounds: Normal breath sounds. No wheezing, rhonchi or rales. Abdominal:      General: Bowel sounds are normal. There is no distension. Palpations: Abdomen is soft. There is no mass. Tenderness: There is no abdominal tenderness. There is no guarding or rebound. Musculoskeletal: Normal range of motion. Lymphadenopathy:      Cervical: No cervical adenopathy. Skin:     General: Skin is warm and dry. Findings: No rash. Neurological:      Mental Status: She is alert and oriented to person, place, and time. Psychiatric:         Behavior: Behavior normal.       :       Diagnosis Orders   1. Cough due to ACE inhibitor     2.  Essential hypertension  losartan (COZAAR) 50 MG tablet :      Requested Prescriptions     Signed Prescriptions Disp Refills    losartan (COZAAR) 50 MG tablet 90 tablet 1     Sig: Take 1 tablet by mouth daily    losartan (COZAAR) 50 MG tablet 30 tablet 0     Sig: Take 1 tablet by mouth daily     Current Outpatient Medications   Medication Sig Dispense Refill    losartan (COZAAR) 50 MG tablet Take 1 tablet by mouth daily 90 tablet 1    losartan (COZAAR) 50 MG tablet Take 1 tablet by mouth daily 30 tablet 0    ketoconazole (NIZORAL) 2 % cream APPLY 1 APPLICATION TOPICALLY TO RIGHT FOOT TWICE DAILY FOR 4 WEEKS THEN A FEW DAYS PER WEEK AS PREVENTION.  atenolol-chlorthalidone (TENORETIC) 50-25 MG per tablet TAKE 1 TABLET BY MOUTH TWICE DAILY 180 tablet 1    tiZANidine (ZANAFLEX) 4 MG tablet Take 4 mg by mouth every 6 hours as needed      aspirin 81 MG tablet Take 1 tablet by mouth daily 30 tablet 3    NONFORMULARY Take 2 tablets by mouth daily Eye promise      Multiple Vitamin TABS Take by mouth every morning      CALCIUM CITRATE PO Take 1,260 mg by mouth 2 times daily       omeprazole (PRILOSEC) 20 MG capsule Take 1 capsule by mouth daily   2    Ascorbic Acid (VITAMIN C) 1000 MG tablet Take 1,000 mg by mouth daily        No current facility-administered medications for this visit. No orders of the defined types were placed in this encounter. Continue current medications. Return in about 4 months (around 4/14/2021) for HTN. Discussed use, benefit, and side effects of prescribed medications. All patient questions answered. Pt voiced understanding. Instructed to continue current medications,diet and exercise. Patient agreed with treatment plan.

## 2020-12-15 NOTE — TELEPHONE ENCOUNTER
I called them and they advised me the order was already cancelled.
Pt called back said that she called [facility] to cancel the Losartan that we sent incorrectly yesterday to them and she was told that the doctors office has to call [facility] at [phone] to cancel the Rx.
Pt called said that the Losartan 50 mg one daily should have gone to [facility] yesterday. [facility] to [facility].
I have reviewed and confirmed nurses' notes...

## 2020-12-16 RX ORDER — LOSARTAN POTASSIUM 50 MG/1
50 TABLET ORAL DAILY
Qty: 90 TABLET | Refills: 1 | Status: SHIPPED | OUTPATIENT
Start: 2020-12-16 | End: 2021-01-22 | Stop reason: SDUPTHER

## 2021-01-22 DIAGNOSIS — I10 ESSENTIAL HYPERTENSION: ICD-10-CM

## 2021-01-22 RX ORDER — LOSARTAN POTASSIUM 50 MG/1
50 TABLET ORAL DAILY
Qty: 90 TABLET | Refills: 1 | Status: SHIPPED | OUTPATIENT
Start: 2021-01-22 | End: 2021-03-22 | Stop reason: SDUPTHER

## 2021-01-22 NOTE — TELEPHONE ENCOUNTER
Date of last visit:  12/14/2020  Date of next visit:  4/14/2021    Requested Prescriptions     Pending Prescriptions Disp Refills    losartan (COZAAR) 50 MG tablet 90 tablet 1     Sig: Take 1 tablet by mouth daily

## 2021-02-01 RX ORDER — ATENOLOL AND CHLORTHALIDONE TABLET 50; 25 MG/1; MG/1
TABLET ORAL
Qty: 180 TABLET | Refills: 1 | Status: SHIPPED | OUTPATIENT
Start: 2021-02-01 | End: 2021-07-08

## 2021-02-01 NOTE — TELEPHONE ENCOUNTER
Date of last visit:  12/14/2020  Date of next visit:  4/14/2021    Requested Prescriptions     Pending Prescriptions Disp Refills    atenolol-chlorthalidone (TENORETIC) 50-25 MG per tablet [Pharmacy Med Name: ATENOL/CHLORTHAL TAB 50/25] 180 tablet 1     Sig: TAKE 1 TABLET BY MOUTH  TWICE DAILY

## 2021-03-22 ENCOUNTER — TELEPHONE (OUTPATIENT)
Dept: FAMILY MEDICINE CLINIC | Age: 71
End: 2021-03-22

## 2021-03-22 DIAGNOSIS — I10 ESSENTIAL HYPERTENSION: ICD-10-CM

## 2021-03-22 RX ORDER — LOSARTAN POTASSIUM 50 MG/1
50 TABLET ORAL DAILY
Qty: 30 TABLET | Refills: 0 | Status: SHIPPED | OUTPATIENT
Start: 2021-03-22 | End: 2021-04-14 | Stop reason: SINTOL

## 2021-03-22 NOTE — TELEPHONE ENCOUNTER
Date of last visit:  12/14/2020  Date of next visit:  4/14/2021    Requested Prescriptions     Pending Prescriptions Disp Refills    losartan (COZAAR) 50 MG tablet 30 tablet 0     Sig: Take 1 tablet by mouth daily
Ermelinda Valentino informed by Phone.
Please let her know that Rx was done to [facility] for [facility] term
Pt called stating that [facility] called her to let her know that they are currently out of Losartan. Pt was put on a waiting list until it comes in. She is needing a refill now. She is asking if you would be able to call a 30 day supply to local pharmacy to help her get through until mail order gets it in.     Losartan 50 mg QD    Send to [facility] on [employer] [facility] [employer]
no tingling/no chills

## 2021-04-14 ENCOUNTER — OFFICE VISIT (OUTPATIENT)
Dept: FAMILY MEDICINE CLINIC | Age: 71
End: 2021-04-14

## 2021-04-14 VITALS
RESPIRATION RATE: 16 BRPM | DIASTOLIC BLOOD PRESSURE: 82 MMHG | HEIGHT: 66 IN | SYSTOLIC BLOOD PRESSURE: 138 MMHG | WEIGHT: 170.25 LBS | BODY MASS INDEX: 27.36 KG/M2 | TEMPERATURE: 97.9 F | HEART RATE: 64 BPM

## 2021-04-14 DIAGNOSIS — I10 ESSENTIAL HYPERTENSION: Primary | ICD-10-CM

## 2021-04-14 DIAGNOSIS — R05.8 COUGH DUE TO ANGIOTENSIN-CONVERTING ENZYME INHIBITOR: ICD-10-CM

## 2021-04-14 DIAGNOSIS — T46.4X5A COUGH DUE TO ANGIOTENSIN-CONVERTING ENZYME INHIBITOR: ICD-10-CM

## 2021-04-14 DIAGNOSIS — M16.12 PRIMARY OSTEOARTHRITIS OF LEFT HIP: ICD-10-CM

## 2021-04-14 PROCEDURE — G8417 CALC BMI ABV UP PARAM F/U: HCPCS | Performed by: FAMILY MEDICINE

## 2021-04-14 PROCEDURE — 1123F ACP DISCUSS/DSCN MKR DOCD: CPT | Performed by: FAMILY MEDICINE

## 2021-04-14 PROCEDURE — 3017F COLORECTAL CA SCREEN DOC REV: CPT | Performed by: FAMILY MEDICINE

## 2021-04-14 PROCEDURE — 1090F PRES/ABSN URINE INCON ASSESS: CPT | Performed by: FAMILY MEDICINE

## 2021-04-14 PROCEDURE — G8399 PT W/DXA RESULTS DOCUMENT: HCPCS | Performed by: FAMILY MEDICINE

## 2021-04-14 PROCEDURE — G8427 DOCREV CUR MEDS BY ELIG CLIN: HCPCS | Performed by: FAMILY MEDICINE

## 2021-04-14 PROCEDURE — 99213 OFFICE O/P EST LOW 20 MIN: CPT | Performed by: FAMILY MEDICINE

## 2021-04-14 PROCEDURE — 4040F PNEUMOC VAC/ADMIN/RCVD: CPT | Performed by: FAMILY MEDICINE

## 2021-04-14 PROCEDURE — 1036F TOBACCO NON-USER: CPT | Performed by: FAMILY MEDICINE

## 2021-04-14 RX ORDER — AMLODIPINE BESYLATE 2.5 MG/1
2.5 TABLET ORAL DAILY
Qty: 30 TABLET | Refills: 1 | Status: SHIPPED | OUTPATIENT
Start: 2021-04-14 | End: 2021-05-14 | Stop reason: SDUPTHER

## 2021-04-14 RX ORDER — HYDROXYCHLOROQUINE SULFATE 200 MG/1
200 TABLET, FILM COATED ORAL NIGHTLY
COMMUNITY
Start: 2021-03-10

## 2021-04-14 ASSESSMENT — ENCOUNTER SYMPTOMS
COUGH: 1
CHEST TIGHTNESS: 0
VOMITING: 0
ABDOMINAL PAIN: 0
CONSTIPATION: 0
DIARRHEA: 0
EYES NEGATIVE: 1
BACK PAIN: 1
NAUSEA: 0
SHORTNESS OF BREATH: 0

## 2021-04-14 ASSESSMENT — PATIENT HEALTH QUESTIONNAIRE - PHQ9
2. FEELING DOWN, DEPRESSED OR HOPELESS: 0
SUM OF ALL RESPONSES TO PHQ QUESTIONS 1-9: 0
SUM OF ALL RESPONSES TO PHQ QUESTIONS 1-9: 0
1. LITTLE INTEREST OR PLEASURE IN DOING THINGS: 0

## 2021-04-14 NOTE — PROGRESS NOTES
Date: 4/14/2021    Slim Castillo is a 69 y.o. female who presents today for:  Chief Complaint   Patient presents with    Check-Up    Hypertension    Other     cough from losartan, she states that it is the same cough she had with ACE inhibitors. HPI:     Hypertension  This is a chronic problem. The problem is unchanged. The problem is controlled. Pertinent negatives include no chest pain, headaches, palpitations or shortness of breath. Past treatments include angiotensin blockers, beta blockers and diuretics. The current treatment provides significant improvement. Other  Associated symptoms include arthralgias and coughing (dry non productive cough for about 3 weeks). Pertinent negatives include no abdominal pain, chest pain, diaphoresis, fatigue, fever, headaches, nausea, numbness, rash, vomiting or weakness. has a current medication list which includes the following prescription(s): hydroxychloroquine, amlodipine, atenolol-chlorthalidone, ketoconazole, tizanidine, aspirin, NONFORMULARY, multiple vitamin, calcium citrate, omeprazole, and vitamin c.     Allergies   Allergen Reactions    Minocycline Swelling    Sulfa Antibiotics Swelling    Bactrim Diarrhea    Erythromycin Diarrhea    Nortriptyline Itching    Vicodin [Hydrocodone-Acetaminophen] Other (See Comments)     Headache.  severe       Social History     Tobacco Use    Smoking status: Never Smoker    Smokeless tobacco: Never Used   Substance Use Topics    Alcohol use: No    Drug use: No       Past Medical History:   Diagnosis Date    Cervical radiculopathy     Degenerative arthritis of cervical spine     Degenerative lumbar disc     Fibromyalgia     GERD (gastroesophageal reflux disease)     Hydronephrosis 02/11/2016    right kidney    Hypertension     Osteoarthritis     neck,hands    Spinal stenosis     UPJ (ureteropelvic junction) obstruction 06/22/2002    Vitreous detachment of left eye 1996    Vitreous detachment of right eye 2005       Past Surgical History:   Procedure Laterality Date    BACK SURGERY  2017    L2-5 Decompression L2-5 posterior fusion and tlif by Dr Yunier Copeland  2004    biopsy, Dr. Lizzeth Vazquez  2015    CARDIOVASCULAR STRESS TEST  2014    CATARACT REMOVAL WITH IMPLANT Left 2016    CATARACT REMOVAL WITH IMPLANT Right 2016    CERVICAL [address] SURGERY  2008    C5-6 discectomy, Dr. Will Martinez -Colleen Caicedo  10/02 10/05 11/10   [facility]anjit Adhikari, [facility]    HIP ARTHROSCOPY Right 2014    labrum repair and PSCAS lengthening - Dr. Evelyn Ellington in [facility]  2001    Dr. Kristi España - Eddie Young Right 2014    TOTAL HIP, DR. Aldo Chapman - [facility]    JOINT REPLACEMENT Left 10/22/2018    LUMBAR [address] SURGERY  2000    [facility]    L3-4 with cage, Dr. Fco Neri [facility]  2016    radiofrequency ablation right SI joint - Dr. Tirso Bentley Right 2016    right SI joint    [place] Right 2012    Dr. Ye Mckoy Left 2014    Dr. Irving Barton    [address] Left 2017    LEFT TOTAL HIP ARTHROPLASTY performed by Cyrus Lua MD at [address] [place] [address]   2016    [facility]  D/T ureteropelvic junction obstruction, hydronephrosis       Family History   Problem Relation Age of Onset    Heart Disease Mother     Arthritis Father     Kidney Disease Father     Other Father         MDS   81 y/o    Heart Disease Father 61        cabg    Cancer Father         kidney    Stroke Paternal Grandmother     Diabetes Paternal Grandmother     High Blood Pressure Sister     High Cholesterol Sister      Subjective:     Review of Systems   Constitutional: Negative for activity change, appetite change, diaphoresis, fatigue and fever. HENT: Negative. Eyes: Negative. Respiratory: Positive for cough (dry non productive cough for about 3 weeks). Negative for chest tightness and shortness of breath. Cardiovascular: Negative for chest pain, palpitations and leg swelling. Gastrointestinal: Negative for abdominal pain, constipation, diarrhea, nausea and vomiting. Genitourinary: Negative. Musculoskeletal: Positive for arthralgias and back pain. She goes to [facility]   Skin: Negative. Negative for rash. Neurological: Negative for dizziness, syncope, weakness, light-headedness, numbness and headaches. Psychiatric/Behavioral: Negative.        :   /82 (Site: Left Upper Arm, Position: Sitting, Cuff Size: Medium Adult)   Pulse 64   Temp 97.9 °F (36.6 °C) (Skin)   Resp 16   Ht 5' 6\" (1.676 m)   Wt 170 lb 4 oz (77.2 kg)   BMI 27.48 kg/m²   Wt Readings from Last 3 Encounters:   04/14/21 170 lb 4 oz (77.2 kg)   12/14/20 172 lb 2 oz (78.1 kg)   11/16/20 169 lb 4 oz (76.8 kg)     Physical Exam  Vitals signs and nursing note reviewed. Constitutional:       General: She is not in acute distress. Appearance: She is well-developed. She is not diaphoretic. HENT:      Head: Normocephalic and atraumatic. Eyes:      General: No scleral icterus. Right eye: No discharge. Left eye: No discharge. Conjunctiva/sclera: Conjunctivae normal.      Pupils: Pupils are equal, round, and reactive to light. Neck:      Musculoskeletal: Normal range of motion and neck supple. Thyroid: No thyromegaly. Vascular: No JVD. Cardiovascular:      Rate and Rhythm: Normal rate and regular rhythm. Heart sounds: Normal heart sounds. No murmur. Pulmonary:      Effort: No respiratory distress. Breath sounds: Normal breath sounds. No wheezing, rhonchi or rales. Continue current medications. Return in about 4 weeks (around 5/12/2021) for HTN. Discussed use, benefit, and side effects of prescribed medications. All patient questions answered. Pt voiced understanding. Instructed to continue current medications,diet and exercise. Patient agreed with treatment plan.

## 2021-04-15 ENCOUNTER — HOSPITAL ENCOUNTER (OUTPATIENT)
Dept: WOMENS IMAGING | Age: 71
Discharge: HOME | End: 2021-04-15
Payer: MEDICARE

## 2021-04-15 DIAGNOSIS — Z12.31 VISIT FOR SCREENING MAMMOGRAM: ICD-10-CM

## 2021-04-15 PROCEDURE — 77067 SCR MAMMO BI INCL CAD: CPT

## 2021-05-14 ENCOUNTER — OFFICE VISIT (OUTPATIENT)
Dept: FAMILY MEDICINE CLINIC | Age: 71
End: 2021-05-14

## 2021-05-14 VITALS
SYSTOLIC BLOOD PRESSURE: 132 MMHG | HEART RATE: 74 BPM | HEIGHT: 66 IN | DIASTOLIC BLOOD PRESSURE: 78 MMHG | BODY MASS INDEX: 27.48 KG/M2 | RESPIRATION RATE: 16 BRPM | WEIGHT: 171 LBS | TEMPERATURE: 96.3 F

## 2021-05-14 DIAGNOSIS — I10 ESSENTIAL HYPERTENSION: Primary | ICD-10-CM

## 2021-05-14 DIAGNOSIS — R05.9 COUGH: ICD-10-CM

## 2021-05-14 PROCEDURE — 99213 OFFICE O/P EST LOW 20 MIN: CPT | Performed by: FAMILY MEDICINE

## 2021-05-14 PROCEDURE — G8427 DOCREV CUR MEDS BY ELIG CLIN: HCPCS | Performed by: FAMILY MEDICINE

## 2021-05-14 PROCEDURE — G8399 PT W/DXA RESULTS DOCUMENT: HCPCS | Performed by: FAMILY MEDICINE

## 2021-05-14 PROCEDURE — 4040F PNEUMOC VAC/ADMIN/RCVD: CPT | Performed by: FAMILY MEDICINE

## 2021-05-14 PROCEDURE — 1090F PRES/ABSN URINE INCON ASSESS: CPT | Performed by: FAMILY MEDICINE

## 2021-05-14 PROCEDURE — 3017F COLORECTAL CA SCREEN DOC REV: CPT | Performed by: FAMILY MEDICINE

## 2021-05-14 PROCEDURE — 1123F ACP DISCUSS/DSCN MKR DOCD: CPT | Performed by: FAMILY MEDICINE

## 2021-05-14 PROCEDURE — G8417 CALC BMI ABV UP PARAM F/U: HCPCS | Performed by: FAMILY MEDICINE

## 2021-05-14 PROCEDURE — 1036F TOBACCO NON-USER: CPT | Performed by: FAMILY MEDICINE

## 2021-05-14 RX ORDER — AMLODIPINE BESYLATE 2.5 MG/1
2.5 TABLET ORAL DAILY
Qty: 90 TABLET | Refills: 1 | Status: SHIPPED | OUTPATIENT
Start: 2021-05-14 | End: 2021-10-12

## 2021-05-14 RX ORDER — CLINDAMYCIN HYDROCHLORIDE 150 MG/1
CAPSULE ORAL
COMMUNITY
Start: 2021-05-12

## 2021-05-14 ASSESSMENT — ENCOUNTER SYMPTOMS
DIARRHEA: 0
EYES NEGATIVE: 1
BACK PAIN: 1
CHEST TIGHTNESS: 0
VOMITING: 0
CONSTIPATION: 0
NAUSEA: 0
SHORTNESS OF BREATH: 0
ABDOMINAL PAIN: 0

## 2021-05-14 NOTE — PROGRESS NOTES
Date: 5/14/2021    Pari West is a 69 y.o. female who presents today for:  Chief Complaint   Patient presents with    Hypertension stable    Cough she states that her cough is resolved since we discontinued her Losartan. She is tolerating Norvasc and has no side effects from it. HPI:     Hypertension  This is a chronic problem. The current episode started more than 1 year ago. The problem is unchanged. The problem is controlled. Pertinent negatives include no chest pain, headaches, palpitations or shortness of breath. Risk factors for coronary artery disease include obesity and post-menopausal state. Past treatments include calcium channel blockers, beta blockers and diuretics. The current treatment provides significant improvement. There are no compliance problems. has a current medication list which includes the following prescription(s): clindamycin, amlodipine, hydroxychloroquine, atenolol-chlorthalidone, ketoconazole, tizanidine, aspirin, NONFORMULARY, multiple vitamin, calcium citrate, omeprazole, and vitamin c.     Allergies   Allergen Reactions    Minocycline Swelling    Sulfa Antibiotics Swelling    Bactrim Diarrhea    Erythromycin Diarrhea    Nortriptyline Itching    Vicodin [Hydrocodone-Acetaminophen] Other (See Comments)     Headache.  severe       Social History     Tobacco Use    Smoking status: Never Smoker    Smokeless tobacco: Never Used   Substance Use Topics    Alcohol use: No    Drug use: No       Past Medical History:   Diagnosis Date    Cervical radiculopathy     Degenerative arthritis of cervical spine     Degenerative lumbar disc     Fibromyalgia     GERD (gastroesophageal reflux disease)     Hydronephrosis 02/11/2016    right kidney    Hypertension     Osteoarthritis     neck,hands    Spinal stenosis     UPJ (ureteropelvic junction) obstruction 06/22/2002    Vitreous detachment of left eye 1996    Vitreous detachment of right eye 2005       Past Cancer Maternal Cousin     Breast Cancer Maternal Cousin     Breast Cancer Maternal Cousin     Breast Cancer Maternal Cousin     Breast Cancer Maternal Cousin 76     Subjective:     Review of Systems   Constitutional: Negative for activity change, appetite change, diaphoresis, fatigue and fever. HENT: Negative. Eyes: Negative. Respiratory: Negative for chest tightness and shortness of breath. Cough: her cough is gone. Cardiovascular: Negative for chest pain, palpitations and leg swelling. Gastrointestinal: Negative for abdominal pain, constipation, diarrhea, nausea and vomiting. Genitourinary: Negative. Musculoskeletal: Positive for arthralgias and back pain. She is following with ortho and also sees Dr Oscar Hawley. Skin: Negative. Negative for rash. Neurological: Negative for dizziness, syncope, weakness, light-headedness, numbness and headaches. Psychiatric/Behavioral: Negative.        :   /78 (Site: Right Upper Arm, Position: Sitting, Cuff Size: Medium Adult)   Pulse 74   Temp 96.3 °F (35.7 °C) (Skin)   Resp 16   Ht 5' 6\" (1.676 m)   Wt 171 lb (77.6 kg)   BMI 27.60 kg/m²   Wt Readings from Last 3 Encounters:   05/14/21 171 lb (77.6 kg)   04/14/21 170 lb 4 oz (77.2 kg)   12/14/20 172 lb 2 oz (78.1 kg)     Physical Exam  Vitals signs and nursing note reviewed. Constitutional:       General: She is not in acute distress. Appearance: She is well-developed. She is not diaphoretic. HENT:      Head: Normocephalic and atraumatic. Eyes:      General: No scleral icterus. Right eye: No discharge. Left eye: No discharge. Conjunctiva/sclera: Conjunctivae normal.      Pupils: Pupils are equal, round, and reactive to light. Neck:      Musculoskeletal: Normal range of motion and neck supple. Thyroid: No thyromegaly. Vascular: No JVD. Cardiovascular:      Rate and Rhythm: Normal rate and regular rhythm. Heart sounds: Normal heart sounds. No murmur. Pulmonary:      Effort: No respiratory distress. Breath sounds: Normal breath sounds. No wheezing, rhonchi or rales. Abdominal:      General: Bowel sounds are normal. There is no distension. Palpations: Abdomen is soft. There is no mass. Tenderness: There is no abdominal tenderness. There is no guarding or rebound. Lymphadenopathy:      Cervical: No cervical adenopathy. Skin:     General: Skin is warm and dry. Findings: No rash. Neurological:      Mental Status: She is alert and oriented to person, place, and time. Psychiatric:         Behavior: Behavior normal.       :       Diagnosis Orders   1. Essential hypertension     2. Cough         :      Requested Prescriptions     Signed Prescriptions Disp Refills    amLODIPine (NORVASC) 2.5 MG tablet 90 tablet 1     Sig: Take 1 tablet by mouth daily     Current Outpatient Medications   Medication Sig Dispense Refill    clindamycin (CLEOCIN) 150 MG capsule TAKE FOUR CAPSULES BY MOUTH ONE HOUR BEFORE APPOINTMENT      amLODIPine (NORVASC) 2.5 MG tablet Take 1 tablet by mouth daily 90 tablet 1    hydroxychloroquine (PLAQUENIL) 200 MG tablet TAKE 1 TABLET BY MOUTH ONCE DAILY      atenolol-chlorthalidone (TENORETIC) 50-25 MG per tablet TAKE 1 TABLET BY MOUTH  TWICE DAILY 180 tablet 1    ketoconazole (NIZORAL) 2 % cream APPLY 1 APPLICATION TOPICALLY TO RIGHT FOOT TWICE DAILY FOR 4 WEEKS THEN A FEW DAYS PER WEEK AS PREVENTION.       tiZANidine (ZANAFLEX) 4 MG tablet Take 4 mg by mouth every 6 hours as needed      aspirin 81 MG tablet Take 1 tablet by mouth daily 30 tablet 3    NONFORMULARY Take 2 tablets by mouth daily [facility]      Multiple Vitamin TABS Take by mouth every morning      CALCIUM CITRATE PO Take 1,260 mg by mouth 2 times daily       omeprazole (PRILOSEC) 20 MG capsule Take 1 capsule by mouth daily   2    Ascorbic Acid (VITAMIN C) 1000 MG tablet Take 1,000 mg by mouth daily        No current facility-administered medications for this visit. No orders of the defined types were placed in this encounter. Continue current medications. Return in about 4 months (around 9/14/2021) for HTN. Discussed use, benefit, and side effects of prescribed medications. All patient questions answered. Pt voiced understanding. Instructed to continue current medications,diet and exercise. Patient agreed with treatment plan.

## 2021-07-08 RX ORDER — ATENOLOL AND CHLORTHALIDONE TABLET 50; 25 MG/1; MG/1
TABLET ORAL
Qty: 180 TABLET | Refills: 1 | Status: SHIPPED | OUTPATIENT
Start: 2021-07-08 | End: 2021-11-26 | Stop reason: ALTCHOICE

## 2021-07-08 NOTE — TELEPHONE ENCOUNTER
Date of last visit:  5/14/2021   Date of next visit:  9/17/2021    Requested Prescriptions     Pending Prescriptions Disp Refills    atenolol-chlorthalidone (TENORETIC) 50-25 MG per tablet [Pharmacy Med Name: ATENOL/CHLORTHAL TAB 50/25] 180 tablet 1     Sig: TAKE 1 TABLET BY MOUTH  TWICE DAILY

## 2021-08-22 ENCOUNTER — APPOINTMENT (OUTPATIENT)
Dept: GENERAL RADIOLOGY | Age: 71
End: 2021-08-22
Payer: MEDICARE

## 2021-08-22 ENCOUNTER — HOSPITAL ENCOUNTER (OUTPATIENT)
Age: 71
Setting detail: OBSERVATION
Discharge: HOME | End: 2021-08-23
Attending: FAMILY MEDICINE | Admitting: INTERNAL MEDICINE
Payer: MEDICARE

## 2021-08-22 DIAGNOSIS — R07.9 ACUTE CHEST PAIN: Primary | ICD-10-CM

## 2021-08-22 LAB
ALBUMIN SERPL-MCNC: 4.5 G/DL (ref 3.5–5.1)
ALP BLD-CCNC: 55 U/L (ref 38–126)
ALT SERPL-CCNC: 26 U/L (ref 11–66)
ANION GAP SERPL CALCULATED.3IONS-SCNC: 15 MEQ/L (ref 8–16)
AST SERPL-CCNC: 27 U/L (ref 5–40)
BASOPHILS # BLD: 0.5 %
BASOPHILS ABSOLUTE: 0.1 THOU/MM3 (ref 0–0.1)
BILIRUB SERPL-MCNC: 0.6 MG/DL (ref 0.3–1.2)
BUN BLDV-MCNC: 20 MG/DL (ref 7–22)
CALCIUM SERPL-MCNC: 10.4 MG/DL (ref 8.5–10.5)
CHLORIDE BLD-SCNC: 98 MEQ/L (ref 98–111)
CO2: 25 MEQ/L (ref 23–33)
CREAT SERPL-MCNC: 0.8 MG/DL (ref 0.4–1.2)
EKG ATRIAL RATE: 71 BPM
EKG P AXIS: 69 DEGREES
EKG P-R INTERVAL: 184 MS
EKG Q-T INTERVAL: 368 MS
EKG QRS DURATION: 98 MS
EKG QTC CALCULATION (BAZETT): 399 MS
EKG R AXIS: 14 DEGREES
EKG T AXIS: -7 DEGREES
EKG VENTRICULAR RATE: 71 BPM
EOSINOPHIL # BLD: 2 %
EOSINOPHILS ABSOLUTE: 0.2 THOU/MM3 (ref 0–0.4)
ERYTHROCYTE [DISTWIDTH] IN BLOOD BY AUTOMATED COUNT: 13.1 % (ref 11.5–14.5)
ERYTHROCYTE [DISTWIDTH] IN BLOOD BY AUTOMATED COUNT: 44.4 FL (ref 35–45)
GFR SERPL CREATININE-BSD FRML MDRD: 71 ML/MIN/1.73M2
GLUCOSE BLD-MCNC: 117 MG/DL (ref 70–108)
HCT VFR BLD CALC: 41.8 % (ref 37–47)
HEMOGLOBIN: 13.9 GM/DL (ref 12–16)
IMMATURE GRANS (ABS): 0.05 THOU/MM3 (ref 0–0.07)
IMMATURE GRANULOCYTES: 0.5 %
LYMPHOCYTES # BLD: 18.5 %
LYMPHOCYTES ABSOLUTE: 1.9 THOU/MM3 (ref 1–4.8)
MAGNESIUM: 1.2 MG/DL (ref 1.6–2.4)
MCH RBC QN AUTO: 31 PG (ref 26–33)
MCHC RBC AUTO-ENTMCNC: 33.3 GM/DL (ref 32.2–35.5)
MCV RBC AUTO: 93.3 FL (ref 81–99)
MONOCYTES # BLD: 8.2 %
MONOCYTES ABSOLUTE: 0.9 THOU/MM3 (ref 0.4–1.3)
NUCLEATED RED BLOOD CELLS: 0 /100 WBC
OSMOLALITY CALCULATION: 279.3 MOSMOL/KG (ref 275–300)
PLATELET # BLD: 210 THOU/MM3 (ref 130–400)
PMV BLD AUTO: 8.4 FL (ref 9.4–12.4)
POTASSIUM REFLEX MAGNESIUM: 3.1 MEQ/L (ref 3.5–5.2)
RBC # BLD: 4.48 MILL/MM3 (ref 4.2–5.4)
SEG NEUTROPHILS: 70.3 %
SEGMENTED NEUTROPHILS ABSOLUTE COUNT: 7.3 THOU/MM3 (ref 1.8–7.7)
SODIUM BLD-SCNC: 138 MEQ/L (ref 135–145)
TOTAL PROTEIN: 7.3 G/DL (ref 6.1–8)
TROPONIN T: < 0.01 NG/ML
WBC # BLD: 10.4 THOU/MM3 (ref 4.8–10.8)

## 2021-08-22 PROCEDURE — 71045 X-RAY EXAM CHEST 1 VIEW: CPT

## 2021-08-22 PROCEDURE — 84484 ASSAY OF TROPONIN QUANT: CPT

## 2021-08-22 PROCEDURE — 85025 COMPLETE CBC W/AUTO DIFF WBC: CPT

## 2021-08-22 PROCEDURE — G0378 HOSPITAL OBSERVATION PER HR: HCPCS

## 2021-08-22 PROCEDURE — 96365 THER/PROPH/DIAG IV INF INIT: CPT

## 2021-08-22 PROCEDURE — 2580000003 HC RX 258: Performed by: INTERNAL MEDICINE

## 2021-08-22 PROCEDURE — 2580000003 HC RX 258: Performed by: NURSE PRACTITIONER

## 2021-08-22 PROCEDURE — 6370000000 HC RX 637 (ALT 250 FOR IP): Performed by: NURSE PRACTITIONER

## 2021-08-22 PROCEDURE — 99283 EMERGENCY DEPT VISIT LOW MDM: CPT

## 2021-08-22 PROCEDURE — 96375 TX/PRO/DX INJ NEW DRUG ADDON: CPT

## 2021-08-22 PROCEDURE — 93010 ELECTROCARDIOGRAM REPORT: CPT | Performed by: INTERNAL MEDICINE

## 2021-08-22 PROCEDURE — 6360000002 HC RX W HCPCS: Performed by: INTERNAL MEDICINE

## 2021-08-22 PROCEDURE — 93005 ELECTROCARDIOGRAM TRACING: CPT | Performed by: FAMILY MEDICINE

## 2021-08-22 PROCEDURE — 6370000000 HC RX 637 (ALT 250 FOR IP): Performed by: INTERNAL MEDICINE

## 2021-08-22 PROCEDURE — 83735 ASSAY OF MAGNESIUM: CPT

## 2021-08-22 PROCEDURE — 6360000002 HC RX W HCPCS: Performed by: NURSE PRACTITIONER

## 2021-08-22 PROCEDURE — 96372 THER/PROPH/DIAG INJ SC/IM: CPT

## 2021-08-22 PROCEDURE — 36415 COLL VENOUS BLD VENIPUNCTURE: CPT

## 2021-08-22 PROCEDURE — 80053 COMPREHEN METABOLIC PANEL: CPT

## 2021-08-22 RX ORDER — POTASSIUM CHLORIDE 7.45 MG/ML
10 INJECTION INTRAVENOUS PRN
Status: DISCONTINUED | OUTPATIENT
Start: 2021-08-22 | End: 2021-08-23 | Stop reason: HOSPADM

## 2021-08-22 RX ORDER — SODIUM CHLORIDE 0.9 % (FLUSH) 0.9 %
5-40 SYRINGE (ML) INJECTION EVERY 12 HOURS SCHEDULED
Status: DISCONTINUED | OUTPATIENT
Start: 2021-08-22 | End: 2021-08-23 | Stop reason: HOSPADM

## 2021-08-22 RX ORDER — AMLODIPINE BESYLATE 2.5 MG/1
2.5 TABLET ORAL DAILY
Status: DISCONTINUED | OUTPATIENT
Start: 2021-08-23 | End: 2021-08-23 | Stop reason: HOSPADM

## 2021-08-22 RX ORDER — MAGNESIUM SULFATE/D5W 1 G/50 ML
1 INTRAVENOUS SOLUTION, PIGGYBACK (ML) INTRAVENOUS ONCE
Status: DISCONTINUED | OUTPATIENT
Start: 2021-08-22 | End: 2021-08-22

## 2021-08-22 RX ORDER — TIZANIDINE 4 MG/1
4 TABLET ORAL NIGHTLY
Status: DISCONTINUED | OUTPATIENT
Start: 2021-08-22 | End: 2021-08-23 | Stop reason: HOSPADM

## 2021-08-22 RX ORDER — ASCORBIC ACID 500 MG
1000 TABLET ORAL DAILY
Status: DISCONTINUED | OUTPATIENT
Start: 2021-08-22 | End: 2021-08-23 | Stop reason: HOSPADM

## 2021-08-22 RX ORDER — ONDANSETRON 4 MG/1
4 TABLET, ORALLY DISINTEGRATING ORAL EVERY 8 HOURS PRN
Status: DISCONTINUED | OUTPATIENT
Start: 2021-08-22 | End: 2021-08-23 | Stop reason: HOSPADM

## 2021-08-22 RX ORDER — HYDROXYCHLOROQUINE SULFATE 200 MG/1
200 TABLET, FILM COATED ORAL NIGHTLY
Status: DISCONTINUED | OUTPATIENT
Start: 2021-08-22 | End: 2021-08-23 | Stop reason: HOSPADM

## 2021-08-22 RX ORDER — ASPIRIN 81 MG/1
324 TABLET, CHEWABLE ORAL ONCE
Status: COMPLETED | OUTPATIENT
Start: 2021-08-22 | End: 2021-08-22

## 2021-08-22 RX ORDER — ATORVASTATIN CALCIUM 80 MG/1
80 TABLET, FILM COATED ORAL NIGHTLY
Status: DISCONTINUED | OUTPATIENT
Start: 2021-08-22 | End: 2021-08-23 | Stop reason: HOSPADM

## 2021-08-22 RX ORDER — ATENOLOL AND CHLORTHALIDONE TABLET 50; 25 MG/1; MG/1
1 TABLET ORAL DAILY
Status: DISCONTINUED | OUTPATIENT
Start: 2021-08-23 | End: 2021-08-23 | Stop reason: HOSPADM

## 2021-08-22 RX ORDER — NITROGLYCERIN 0.4 MG/1
0.4 TABLET SUBLINGUAL EVERY 5 MIN PRN
Status: DISCONTINUED | OUTPATIENT
Start: 2021-08-22 | End: 2021-08-23 | Stop reason: HOSPADM

## 2021-08-22 RX ORDER — ASPIRIN 81 MG/1
81 TABLET ORAL NIGHTLY
Status: DISCONTINUED | OUTPATIENT
Start: 2021-08-22 | End: 2021-08-22 | Stop reason: SDUPTHER

## 2021-08-22 RX ORDER — MORPHINE SULFATE 2 MG/ML
2 INJECTION, SOLUTION INTRAMUSCULAR; INTRAVENOUS ONCE
Status: COMPLETED | OUTPATIENT
Start: 2021-08-22 | End: 2021-08-22

## 2021-08-22 RX ORDER — ASPIRIN 81 MG/1
81 TABLET, CHEWABLE ORAL DAILY
Status: DISCONTINUED | OUTPATIENT
Start: 2021-08-23 | End: 2021-08-23 | Stop reason: HOSPADM

## 2021-08-22 RX ORDER — SODIUM CHLORIDE 0.9 % (FLUSH) 0.9 %
5-40 SYRINGE (ML) INJECTION PRN
Status: DISCONTINUED | OUTPATIENT
Start: 2021-08-22 | End: 2021-08-23 | Stop reason: HOSPADM

## 2021-08-22 RX ORDER — SODIUM CHLORIDE 9 MG/ML
25 INJECTION, SOLUTION INTRAVENOUS PRN
Status: DISCONTINUED | OUTPATIENT
Start: 2021-08-22 | End: 2021-08-23 | Stop reason: HOSPADM

## 2021-08-22 RX ORDER — POTASSIUM CHLORIDE 20 MEQ/1
40 TABLET, EXTENDED RELEASE ORAL PRN
Status: DISCONTINUED | OUTPATIENT
Start: 2021-08-22 | End: 2021-08-23 | Stop reason: HOSPADM

## 2021-08-22 RX ORDER — POLYETHYLENE GLYCOL 3350 17 G/17G
17 POWDER, FOR SOLUTION ORAL DAILY PRN
Status: DISCONTINUED | OUTPATIENT
Start: 2021-08-22 | End: 2021-08-23 | Stop reason: HOSPADM

## 2021-08-22 RX ORDER — ONDANSETRON 2 MG/ML
4 INJECTION INTRAMUSCULAR; INTRAVENOUS EVERY 6 HOURS PRN
Status: DISCONTINUED | OUTPATIENT
Start: 2021-08-22 | End: 2021-08-23 | Stop reason: HOSPADM

## 2021-08-22 RX ORDER — OMEPRAZOLE 20 MG/1
20 CAPSULE, DELAYED RELEASE ORAL DAILY
Status: DISCONTINUED | OUTPATIENT
Start: 2021-08-23 | End: 2021-08-23 | Stop reason: HOSPADM

## 2021-08-22 RX ADMIN — ATENOLOL AND CHLORTHALIDONE TABLET 1 TABLET: 50; 25 TABLET ORAL at 20:22

## 2021-08-22 RX ADMIN — ENOXAPARIN SODIUM 40 MG: 40 INJECTION SUBCUTANEOUS at 13:21

## 2021-08-22 RX ADMIN — MAGNESIUM SULFATE 1000 MG: 500 INJECTION, SOLUTION INTRAMUSCULAR; INTRAVENOUS at 10:29

## 2021-08-22 RX ADMIN — ASPIRIN 81 MG: 81 TABLET, CHEWABLE ORAL at 20:22

## 2021-08-22 RX ADMIN — ATORVASTATIN CALCIUM 80 MG: 80 TABLET, FILM COATED ORAL at 20:22

## 2021-08-22 RX ADMIN — SODIUM CHLORIDE, PRESERVATIVE FREE 10 ML: 5 INJECTION INTRAVENOUS at 20:22

## 2021-08-22 RX ADMIN — ASPIRIN 324 MG: 81 TABLET, CHEWABLE ORAL at 10:29

## 2021-08-22 RX ADMIN — POTASSIUM CHLORIDE 40 MEQ: 20 TABLET, EXTENDED RELEASE ORAL at 13:21

## 2021-08-22 RX ADMIN — TIZANIDINE 4 MG: 4 TABLET ORAL at 22:12

## 2021-08-22 RX ADMIN — HYDROXYCHLOROQUINE SULFATE 200 MG: 200 TABLET, FILM COATED ORAL at 20:22

## 2021-08-22 RX ADMIN — NITROGLYCERIN 0.4 MG: 0.4 TABLET, ORALLY DISINTEGRATING SUBLINGUAL at 13:08

## 2021-08-22 RX ADMIN — MORPHINE SULFATE 2 MG: 2 INJECTION, SOLUTION INTRAMUSCULAR; INTRAVENOUS at 10:29

## 2021-08-22 ASSESSMENT — ENCOUNTER SYMPTOMS
COLOR CHANGE: 0
ABDOMINAL DISTENTION: 0
DIARRHEA: 0
WHEEZING: 0
RHINORRHEA: 0
SHORTNESS OF BREATH: 0
SORE THROAT: 0
NAUSEA: 0
COUGH: 0
VOMITING: 0
CONSTIPATION: 0

## 2021-08-22 ASSESSMENT — PAIN SCALES - GENERAL
PAINLEVEL_OUTOF10: 0
PAINLEVEL_OUTOF10: 3
PAINLEVEL_OUTOF10: 0
PAINLEVEL_OUTOF10: 1
PAINLEVEL_OUTOF10: 0

## 2021-08-22 ASSESSMENT — PAIN DESCRIPTION - PAIN TYPE
TYPE: ACUTE PAIN

## 2021-08-22 ASSESSMENT — PAIN DESCRIPTION - LOCATION
LOCATION: CHEST;ARM
LOCATION: CHEST
LOCATION: CHEST

## 2021-08-22 ASSESSMENT — PAIN DESCRIPTION - ORIENTATION: ORIENTATION: MID

## 2021-08-22 ASSESSMENT — PAIN DESCRIPTION - FREQUENCY: FREQUENCY: CONTINUOUS

## 2021-08-22 ASSESSMENT — PAIN DESCRIPTION - DIRECTION: RADIATING_TOWARDS: LEFT ARM

## 2021-08-22 ASSESSMENT — PAIN DESCRIPTION - DESCRIPTORS: DESCRIPTORS: PRESSURE

## 2021-08-22 ASSESSMENT — PAIN DESCRIPTION - ONSET: ONSET: ON-GOING

## 2021-08-22 ASSESSMENT — PAIN - FUNCTIONAL ASSESSMENT: PAIN_FUNCTIONAL_ASSESSMENT: ACTIVITIES ARE NOT PREVENTED

## 2021-08-22 ASSESSMENT — PAIN DESCRIPTION - PROGRESSION: CLINICAL_PROGRESSION: NOT CHANGED

## 2021-08-22 NOTE — ED PROVIDER NOTES
Hypertension     Osteoarthritis     neck,hands    Spinal stenosis     UPJ (ureteropelvic junction) obstruction 06/22/2002    Vitreous detachment of left eye 1996    Vitreous detachment of right eye 2005     Past Surgical History:   Procedure Laterality Date    BACK SURGERY  05/19/2017    L2-5 Decompression L2-5 posterior fusion and tlif by Dr Lloyd Koenig  08/30/2004    biopsy, Dr. Tonja Huggins  02/07/2015    CARDIOVASCULAR STRESS TEST  2014    CATARACT REMOVAL WITH IMPLANT Left 04/25/2016    CATARACT REMOVAL WITH IMPLANT Right 05/09/2016    CERVICAL [facility] [place] SURGERY  01/18/2008    C5-6 discectomy, Dr. Anastasiia Gannon Jessica Lyle  10/02 10/05 11/10   [facility]    Dr. Radha Gatica, [facility]    HIP ARTHROSCOPY Right 07/17/2014    labrum repair and PSCAS lengthening - Dr. Irving Shahid in [facility]  08/18/2001    Dr. Leticia James - [facility] [place] Right 12/16/2014    TOTAL HIP, DR. Jluis Self - [facility]    JOINT REPLACEMENT Left 10/22/2018    LUMBAR [facility] SURGERY  05/2000    [facility]  [facility]    L3-4 with cage, Dr. Jazmin Bess  [facility] [place]  08/23/2016    radiofrequency ablation right SI joint - Dr. Hernan Crowley Right 08/23/2016    right SI joint    [place] [facility] Right 04/23/2012    Dr. Rick Ridley Left 09/22/2014    Dr. Robby Paul    [address] Left 12/11/2017    LEFT TOTAL HIP ARTHROPLASTY performed by Reagan Vega MD at [address] [place] [address]   02/23/2016    [address] [place] [address]  D/T ureteropelvic junction obstruction, hydronephrosis         MEDICATIONS     Current Facility-Administered Medications:     magnesium sulfate 1,000 mg in dextrose 5 % 100 mL IVPB, 1,000 mg, Intravenous, Once, [employer] MD Codie, Last Rate: 50 mL/hr at 21 1029, 1,000 mg at 21 1029      SOCIAL HISTORY     Social History     Social History Narrative    Not on file     Social History     Tobacco Use    Smoking status: Never Smoker    Smokeless tobacco: Never Used   Substance Use Topics    Alcohol use: No    Drug use: No         ALLERGIES     Allergies   Allergen Reactions    Minocycline Swelling    Sulfa Antibiotics Swelling    Bactrim Diarrhea    Erythromycin Diarrhea    Nortriptyline Itching    Vicodin [Hydrocodone-Acetaminophen] Other (See Comments)     Headache.  severe         FAMILY HISTORY     Family History   Problem Relation Age of Onset    Heart Disease Mother     Arthritis Father     Kidney Disease Father     Other Father         MDS   79 y/o    Heart Disease Father 61        cabg    Cancer Father         kidney    Stroke Paternal Grandmother     Diabetes Paternal Grandmother     High Blood Pressure Sister     High Cholesterol Sister     Breast Cancer Maternal Cousin     Breast Cancer Maternal Cousin     Breast Cancer Maternal Cousin     Breast Cancer Maternal Cousin     Breast Cancer Maternal Cousin 76         PREVIOUS RECORDS   Previous records reviewed: Today    PHYSICAL EXAM     ED Triage Vitals [21 0901]   BP Temp Temp Source Pulse Resp SpO2 Height Weight   (!) 140/92 97.8 °F (36.6 °C) Oral 70 18 98 % 5' 6\" (1.676 m) 165 lb (74.8 kg)     Initial vital signs and nursing assessment reviewed and abnormal from hypertension. Body mass index is 26.63 kg/m². Pulsoximetry is normal per my interpretation. Additional Vital Signs:  Vitals:    21 1020   BP: 118/72   Pulse: 63   Resp: 18   Temp:    SpO2: 94%       Physical Exam  Constitutional:       Appearance: Normal appearance. HENT:      Head: Normocephalic.       Right Ear: External ear normal.      Left Ear: External ear normal.      Nose: Nose normal.      Mouth/Throat:      Mouth: Mucous membranes are moist. Pharynx: Oropharynx is clear. Eyes:      Extraocular Movements: Extraocular movements intact. Conjunctiva/sclera: Conjunctivae normal.      Pupils: Pupils are equal, round, and reactive to light. Cardiovascular:      Rate and Rhythm: Normal rate and regular rhythm. Pulses: Normal pulses. Heart sounds: Normal heart sounds. Pulmonary:      Effort: Pulmonary effort is normal.      Breath sounds: Normal breath sounds. Abdominal:      General: Bowel sounds are normal.      Palpations: Abdomen is soft. Musculoskeletal:         General: Normal range of motion. Cervical back: Normal range of motion and neck supple. Skin:     General: Skin is warm and dry. Capillary Refill: Capillary refill takes less than 2 seconds. Neurological:      General: No focal deficit present. Mental Status: She is alert and oriented to person, place, and time. MEDICAL DECISION MAKING   Initial Assessment:   Patient is a 50-year-old female with complaint of chest pain started 3 days ago has been intermittent. Patient states that this radiates to left jaw and left arm at this time. Patient initial troponin is negative. Patient differential diagnosis includes but is not limited to MI, acute coronary syndrome, pneumonia, musculoskeletal pain, and pulmonary embolism. Patient will be admitted for chest pain at this time for further evaluation and treatment. Plan:   Patient was admitted to the hospital at this time for further evaluation and treatment.   Serial troponin levels        ED RESULTS   Laboratory results:  Labs Reviewed   CBC WITH AUTO DIFFERENTIAL - Abnormal; Notable for the following components:       Result Value    MPV 8.4 (*)     All other components within normal limits   COMPREHENSIVE METABOLIC PANEL W/ REFLEX TO MG FOR LOW K - Abnormal; Notable for the following components:    Glucose 117 (*)     Potassium reflex Magnesium 3.1 (*)     All other components within normal limits GLOMERULAR FILTRATION RATE, ESTIMATED - Abnormal; Notable for the following components:    Est, Glom Filt Rate 71 (*)     All other components within normal limits   MAGNESIUM - Abnormal; Notable for the following components:    Magnesium 1.2 (*)     All other components within normal limits   TROPONIN   ANION GAP   OSMOLALITY   TROPONIN   TROPONIN       Radiologic studies results:  XR CHEST PORTABLE   Final Result   No interval change since previous study dated 16 November 2020. Jem Hamilton **This report has been created using voice recognition software. It may contain minor errors which are inherent in voice recognition technology. **      Final report electronically signed by DR Jigna Tong on 8/22/2021 9:35 AM          ED Medications administered this visit:   Medications   magnesium sulfate 1,000 mg in dextrose 5 % 100 mL IVPB (1,000 mg Intravenous New Bag 8/22/21 1029)   aspirin chewable tablet 324 mg (324 mg Oral Given 8/22/21 1029)   morphine (PF) injection 2 mg (2 mg Intravenous Given 8/22/21 1029)         ED COURSE        Patient had an EKG that showed some abnormal ST and T wave abnormalities never no changes from previous EKG. (10-9-18). Patient had a negative troponin at this time as well. MEDICATION CHANGES     Current Discharge Medication List            FINAL DISPOSITION     Final diagnoses:   Acute chest pain     Condition: condition: good  Dispo: Admit to telemetry      This transcription was electronically signed. Parts of this transcriptions may have been dictated by use of voice recognition software and electronically transcribed, and parts may have been transcribed with the assistance of an ED scribe. The transcription may contain errors not detected in proofreading. Please refer to my supervising physician's documentation if my documentation differs.     Electronically Signed: Geanie Bernheim, MD, 08/22/21, Jany Khan MD  Resident  08/22/21 [id number]

## 2021-08-22 NOTE — ED NOTES
ED to inpatient nurses report    Chief Complaint   Patient presents with    Chest Pain      Present to ED from home  LOC: alert and orientated to name, place, date  Vital signs   Vitals:    08/22/21 0901 08/22/21 1020   BP: (!) 140/92 118/72   Pulse: 70 63   Resp: 18 18   Temp: 97.8 °F (36.6 °C)    TempSrc: Oral    SpO2: 98% 94%   Weight: 165 lb (74.8 kg)    Height: 5' 6\" (1.676 m)       Oxygen Baseline room air    Current needs required room air Bipap/Cpap No  LDAs:   Peripheral IV 12/11/17 Right Forearm (Active)   Site Assessment Clean;Dry; Intact 08/22/21 1020   Line Status Normal saline locked 08/22/21 1020   Dressing Status Clean;Dry; Intact 08/22/21 1020       Peripheral IV 08/22/21 Right Forearm (Active)     Mobility: Independent  Pending ED orders: complete  Present condition: stable      Electronically signed by Juma Whitehead RN on 8/22/2021 at 10:43 AM       Juma Whitehead RN  08/22/21 1044

## 2021-08-22 NOTE — PROGRESS NOTES
Pt admitted to  8AB26 via wheelchair from ED. Complaints: Chest pain / discomfort. IV site free of s/s of infection or infiltration. Vital signs obtained. Assessment and data collection initiated. Two nurse skin assessment performed by Patsy Rogers RN and [facility]. Oriented to room. Policies and procedures for 8AB explained. All questions answered with no further questions at this time. Fall prevention and safety brochure discussed with patient. Bed alarm on. Call light in reach. Oriented to room. Sotero Guzman, RN, RN 8/22/2021 6:15 PM     Explained patients right to have family, representative or physician notified of their admission. Patient has Declined for physician to be notified. Patient has Declined for family/representative to be notified. Patient would like family notified once per shift? No, pt will update  self. Call to Dr Amador Morel for admission orders. See orders.

## 2021-08-22 NOTE — ED TRIAGE NOTES
Pt presents to the ED for chest pain x3 days. Pt states she started having left arm pain today. Pt denies taking medication for the pain.  Pt denies SOB

## 2021-08-22 NOTE — Clinical Note
Patient Class: Observation [104]   REQUIRED: Diagnosis: Acute chest pain [580908]   Estimated Length of Stay: Estimated stay of more than 2 midnights   Admitting Provider: Graham Gaona [[id number]]   Telemetry/Cardiac Monitoring Required?: No

## 2021-08-22 NOTE — ED NOTES
Pt transported to [facility] on cart in stable condition. Floor contacted before transport.      Kelly Van  08/22/21 [id number]

## 2021-08-23 VITALS
BODY MASS INDEX: 26.79 KG/M2 | OXYGEN SATURATION: 96 % | SYSTOLIC BLOOD PRESSURE: 137 MMHG | WEIGHT: 166.7 LBS | HEIGHT: 66 IN | RESPIRATION RATE: 16 BRPM | TEMPERATURE: 98 F | DIASTOLIC BLOOD PRESSURE: 77 MMHG | HEART RATE: 65 BPM

## 2021-08-23 LAB
ALBUMIN SERPL-MCNC: 4.2 G/DL (ref 3.5–5.1)
ALP BLD-CCNC: 54 U/L (ref 38–126)
ALT SERPL-CCNC: 25 U/L (ref 11–66)
AST SERPL-CCNC: 25 U/L (ref 5–40)
BILIRUB SERPL-MCNC: 0.5 MG/DL (ref 0.3–1.2)
BILIRUBIN DIRECT: < 0.2 MG/DL (ref 0–0.3)
CHOLESTEROL, TOTAL: 160 MG/DL (ref 100–199)
EKG ATRIAL RATE: 66 BPM
EKG P AXIS: 71 DEGREES
EKG P-R INTERVAL: 186 MS
EKG Q-T INTERVAL: 402 MS
EKG QRS DURATION: 100 MS
EKG QTC CALCULATION (BAZETT): 421 MS
EKG R AXIS: 10 DEGREES
EKG T AXIS: 14 DEGREES
EKG VENTRICULAR RATE: 66 BPM
ERYTHROCYTE [DISTWIDTH] IN BLOOD BY AUTOMATED COUNT: 13.1 % (ref 11.5–14.5)
ERYTHROCYTE [DISTWIDTH] IN BLOOD BY AUTOMATED COUNT: 46 FL (ref 35–45)
HCT VFR BLD CALC: 41.5 % (ref 37–47)
HDLC SERPL-MCNC: 56 MG/DL
HEMOGLOBIN: 13.3 GM/DL (ref 12–16)
LDL CHOLESTEROL CALCULATED: 87 MG/DL
MAGNESIUM: 1.4 MG/DL (ref 1.6–2.4)
MCH RBC QN AUTO: 30.9 PG (ref 26–33)
MCHC RBC AUTO-ENTMCNC: 32 GM/DL (ref 32.2–35.5)
MCV RBC AUTO: 96.5 FL (ref 81–99)
PLATELET # BLD: 210 THOU/MM3 (ref 130–400)
PMV BLD AUTO: 8.8 FL (ref 9.4–12.4)
POTASSIUM SERPL-SCNC: 3.7 MEQ/L (ref 3.5–5.2)
RBC # BLD: 4.3 MILL/MM3 (ref 4.2–5.4)
TOTAL PROTEIN: 6.8 G/DL (ref 6.1–8)
TRIGL SERPL-MCNC: 84 MG/DL (ref 0–199)
WBC # BLD: 8 THOU/MM3 (ref 4.8–10.8)

## 2021-08-23 PROCEDURE — 84132 ASSAY OF SERUM POTASSIUM: CPT

## 2021-08-23 PROCEDURE — 6360000002 HC RX W HCPCS: Performed by: INTERNAL MEDICINE

## 2021-08-23 PROCEDURE — 85027 COMPLETE CBC AUTOMATED: CPT

## 2021-08-23 PROCEDURE — 83735 ASSAY OF MAGNESIUM: CPT

## 2021-08-23 PROCEDURE — G0378 HOSPITAL OBSERVATION PER HR: HCPCS

## 2021-08-23 PROCEDURE — 80061 LIPID PANEL: CPT

## 2021-08-23 PROCEDURE — 93005 ELECTROCARDIOGRAM TRACING: CPT | Performed by: INTERNAL MEDICINE

## 2021-08-23 PROCEDURE — 36415 COLL VENOUS BLD VENIPUNCTURE: CPT

## 2021-08-23 PROCEDURE — 80076 HEPATIC FUNCTION PANEL: CPT

## 2021-08-23 PROCEDURE — 2580000003 HC RX 258: Performed by: INTERNAL MEDICINE

## 2021-08-23 PROCEDURE — 96366 THER/PROPH/DIAG IV INF ADDON: CPT

## 2021-08-23 PROCEDURE — 93010 ELECTROCARDIOGRAM REPORT: CPT | Performed by: INTERNAL MEDICINE

## 2021-08-23 RX ORDER — POTASSIUM CHLORIDE 750 MG/1
10 TABLET, EXTENDED RELEASE ORAL 2 TIMES DAILY
Qty: 180 TABLET | Refills: 1 | Status: SHIPPED | OUTPATIENT
Start: 2021-08-23 | End: 2022-02-28 | Stop reason: SDUPTHER

## 2021-08-23 RX ORDER — MAGNESIUM SULFATE IN WATER 40 MG/ML
2000 INJECTION, SOLUTION INTRAVENOUS PRN
Status: DISCONTINUED | OUTPATIENT
Start: 2021-08-23 | End: 2021-08-23 | Stop reason: HOSPADM

## 2021-08-23 RX ORDER — NITROGLYCERIN 0.4 MG/1
TABLET SUBLINGUAL
Qty: 25 TABLET | Refills: 3 | Status: SHIPPED | OUTPATIENT
Start: 2021-08-23

## 2021-08-23 RX ADMIN — OMEPRAZOLE 20 MG: 20 CAPSULE, DELAYED RELEASE ORAL at 08:23

## 2021-08-23 RX ADMIN — MAGNESIUM SULFATE HEPTAHYDRATE 2000 MG: 40 INJECTION, SOLUTION INTRAVENOUS at 08:25

## 2021-08-23 RX ADMIN — AMLODIPINE BESYLATE 2.5 MG: 2.5 TABLET ORAL at 08:24

## 2021-08-23 RX ADMIN — SODIUM CHLORIDE, PRESERVATIVE FREE 10 ML: 5 INJECTION INTRAVENOUS at 08:25

## 2021-08-23 ASSESSMENT — PAIN SCALES - GENERAL
PAINLEVEL_OUTOF10: 0

## 2021-08-23 NOTE — CARE COORDINATION
8/23/21, 10:21 AM EDT  DISCHARGE PLANNING EVALUATION:    Noy Siddiqi       Admitted: 8/22/2021/ [address] [facility],8W day: 0   [facility] Reason for admit: Acute chest pain [R07.9]   PMH:  has a past medical history of Cervical radiculopathy, Degenerative arthritis of cervical spine, Degenerative lumbar disc, Fibromyalgia, GERD (gastroesophageal reflux disease), Hydronephrosis, Hypertension, Osteoarthritis, Spinal stenosis, UPJ (ureteropelvic junction) obstruction, Vitreous detachment of left eye, and Vitreous detachment of right eye. Procedure: No.   Barriers to Discharge:  Here for CP. Trop neg. Cardiology consulted. PCP: Giulia Granda MD    Patient Goals/Plan/Treatment Preferences: From home with spouse. No services or DME. No Transportation issues or problems getting meds. She has a PCP. Transportation/Food Security/Housekeeping Addressed:  No issues identified. 8/23/21, 10:28 AM EDT    Patient goals/plan/ treatment preferences discussed by  and . Patient goals/plan/ treatment preferences reviewed with patient/ family. Patient/ family verbalize understanding of discharge plan and are in agreement with goal/plan/treatment preferences. Understanding was demonstrated using the teach back method. AVS provided by RN at time of discharge, which includes all necessary medical information pertaining to the patients current course of illness, treatment, post-discharge goals of care, and treatment preferences. Discharge order in place. No needs voiced.

## 2021-08-23 NOTE — PLAN OF CARE
Problem: Pain:  Goal: Pain level will decrease  Description: Pain level will decrease  8/23/2021 1221 by Natalie Acosta RN  Outcome: Completed     Problem: Pain:  Goal: Control of acute pain  Description: Control of acute pain  8/23/2021 1221 by Natalie Acosta RN  Outcome: Completed     Problem: Pain:  Goal: Control of chronic pain  Description: Control of chronic pain  8/23/2021 1221 by Natalie Acosat RN  Outcome: Completed     Problem: Falls - Risk of:  Goal: Will remain free from falls  Description: Will remain free from falls  8/23/2021 1221 by Natalie Acosta RN  Outcome: Completed     Problem: Falls - Risk of:  Goal: Absence of physical injury  Description: Absence of physical injury  8/23/2021 1221 by Natalie Acosta RN  Outcome: Completed     Care plan reviewed with patient. Patient verbalize understanding of the plan of care and contribute to goal setting.

## 2021-08-23 NOTE — PLAN OF CARE
Problem: Pain:  Goal: Pain level will decrease  Description: Pain level will decrease  Outcome: Ongoing  Note: Patient denies pain this shift. Will continue to monitor. Goal: Control of acute pain  Description: Control of acute pain  Outcome: Ongoing  Goal: Control of chronic pain  Description: Control of chronic pain  Outcome: Ongoing     Problem: Falls - Risk of:  Goal: Will remain free from falls  Description: Will remain free from falls  Outcome: Ongoing  Note: Pt remains free from falls/injury this shift. Wearing nonslip socks, uses call light appropriately, utilizes hourly rounding. Frequently used items within reach. Goal: Absence of physical injury  Description: Absence of physical injury  Outcome: Ongoing  Note: Pt remains free from falls/injury this shift. Wearing nonslip socks, uses call light appropriately, utilizes hourly rounding. Frequently used items within reach. Care plan reviewed with patient. Patient verbalize understanding of the plan of care and contribute to goal setting.

## 2021-08-23 NOTE — H&P
[id number] combined h/p and short stay summary dictated
hypercholesterolemia. The patient has history of hip  replacement. No history of claudication. She denied fever, chills, or  rigors. No change in her weight. SOCIAL HISTORY:  She denies smoking or alcohol abuse. ALLERGIES:  THE PATIENT HAS MULTIPLE ALLERGIES, ABOUT 7 ALLERGIES. CODE STATUS:  She is a full code patient. FAMILY HISTORY:  Irrelevant. PHYSICAL EXAMINATION:  VITAL SIGNS:  Exam showed her blood pressure is 117/69. Sinus rhythm. She was afebrile. Respiratory rate was 18. NEUROLOGIC:  She has no focal neurological deficit. NECK:  She has no JVD. There was no thyromegaly. No neck  lymphadenopathy. HEENT:  No discharge from the throat, the ears, or the nose. LUNGS:  Showed scattered rhonchi. HEART:  There was no S3.  ABDOMEN:  Soft. GENITAL/RECTAL:  Deferred. EXTREMITIES:  Lower limbs, there is no edema. LABORATORY DATA:  Her potassium was 3.1. She did receive potassium  supplement. Her blood sugar 117. Her BUN 20 and creatinine 0.8. Troponins were negative. HOSPITALIZATION COURSE:  The patient was admitted to the telemetry bed. She continued to be in sinus rhythm. She has no recurrence of her chest  pain. Her vital signs continued to be stable. No arrhythmia. Troponins are negative. The patient ambulated, continued to be chest  pain free. Different options of treatment were discussed with the patient to have a  heart cath and to have a stress test.  The patient has to have a stress  test.  Arrangements will be made for the patient to have a stress test  as an outpatient. She is to seek medical attention if she had any  change in clinical condition. She is to follow up with family physician  for long-term care. The patient was discharged home in a stable condition. She will be seen  in the office tomorrow.         Sadie Delong M.D.    D: 08/23/2021 7:29:06       T: 08/23/2021 9:22:23     AS/ARNALDO  Job#: [id number]     Doc#: [id number]    CC:

## 2021-08-24 ENCOUNTER — CARE COORDINATION (OUTPATIENT)
Dept: FAMILY MEDICINE CLINIC | Age: 71
End: 2021-08-24

## 2021-08-24 LAB
STRESS STAGE 1 BP: NORMAL MMHG
STRESS STAGE 2 BP: NORMAL MMHG
STRESS STAGE 3 BP: NORMAL MMHG
STRESS STAGE 4 BP: NORMAL MMHG

## 2021-08-24 RX ORDER — CHOLECALCIFEROL (VITAMIN D3) 1250 MCG
1 CAPSULE ORAL DAILY
COMMUNITY
Start: 2021-08-24 | End: 2021-09-03 | Stop reason: ALTCHOICE

## 2021-08-24 NOTE — CARE COORDINATION
[facility] Transitions Initial Follow Up Call    Outreach made within 2 business days of discharge: Yes    Patient: Samira Soto Patient : 1950   MRN: [id number]  Reason for Admission: Chest Pain  Discharge Date: 21       Spoke with: Jenny Salazar    Discharge department/facility:[place] [address] Interactive Patient Contact:  Was patient able to fill all prescriptions: Yes  Was patient instructed to bring all medications to the follow-up visit: Yes  Is patient taking all medications as directed in the discharge summary? Yes  Does patient understand their discharge instructions: Yes  Does patient have questions or concerns that need addressed prior to 7-14 day follow up office visit: no    Jenny Salazar is no longer having chest pain. I talked to her in the middle of her stress test on the break. She said she is feeling good. She has no home needs and has a follow up appointment scheduled.   Scheduled appointment with PCP within 7-14 days    Follow Up  Future Appointments   Date Time Provider Angelique Llamas   9/3/2021  9:20 AM Camille Adams MD [facility]   2021  8:30 AM Camille Adams MD [facility] [address]       Peter Cardona RN

## 2021-09-01 ENCOUNTER — CARE COORDINATION (OUTPATIENT)
Dept: FAMILY MEDICINE CLINIC | Age: 71
End: 2021-09-01

## 2021-09-01 NOTE — CARE COORDINATION
Aliyah Liu has had no chest pain since getting out of the hospital. She had a stress test and the Dr told her their was a small change but nothing to be concerned about. She has no needs at home and if she did she said her  has been a good nurse for her over the years. She is independent with her care. She has a follow up appointment on Friday and I will meet her then.

## 2021-09-03 ENCOUNTER — OFFICE VISIT (OUTPATIENT)
Dept: FAMILY MEDICINE CLINIC | Age: 71
End: 2021-09-03

## 2021-09-03 VITALS
BODY MASS INDEX: 26.9 KG/M2 | SYSTOLIC BLOOD PRESSURE: 136 MMHG | DIASTOLIC BLOOD PRESSURE: 72 MMHG | RESPIRATION RATE: 16 BRPM | WEIGHT: 167.38 LBS | HEIGHT: 66 IN | HEART RATE: 74 BPM | TEMPERATURE: 96.7 F

## 2021-09-03 DIAGNOSIS — E83.42 HYPOMAGNESEMIA: ICD-10-CM

## 2021-09-03 DIAGNOSIS — I10 ESSENTIAL HYPERTENSION: Primary | ICD-10-CM

## 2021-09-03 DIAGNOSIS — E87.6 HYPOKALEMIA: ICD-10-CM

## 2021-09-03 PROCEDURE — 99495 TRANSJ CARE MGMT MOD F2F 14D: CPT | Performed by: FAMILY MEDICINE

## 2021-09-03 RX ORDER — ACETAMINOPHEN 160 MG
TABLET,DISINTEGRATING ORAL
COMMUNITY

## 2021-09-03 RX ORDER — ZINC GLUCONATE 50 MG
50 TABLET ORAL DAILY
COMMUNITY

## 2021-09-03 SDOH — ECONOMIC STABILITY: FOOD INSECURITY: WITHIN THE PAST 12 MONTHS, YOU WORRIED THAT YOUR FOOD WOULD RUN OUT BEFORE YOU GOT MONEY TO BUY MORE.: NEVER TRUE

## 2021-09-03 SDOH — ECONOMIC STABILITY: FOOD INSECURITY: WITHIN THE PAST 12 MONTHS, THE FOOD YOU BOUGHT JUST DIDN'T LAST AND YOU DIDN'T HAVE MONEY TO GET MORE.: NEVER TRUE

## 2021-09-03 ASSESSMENT — ENCOUNTER SYMPTOMS
CHEST TIGHTNESS: 0
VOMITING: 0
BACK PAIN: 1
CONSTIPATION: 0
COUGH: 0
DIARRHEA: 0
ABDOMINAL PAIN: 0
EYES NEGATIVE: 1
NAUSEA: 0
SHORTNESS OF BREATH: 0

## 2021-09-03 ASSESSMENT — SOCIAL DETERMINANTS OF HEALTH (SDOH): HOW HARD IS IT FOR YOU TO PAY FOR THE VERY BASICS LIKE FOOD, HOUSING, MEDICAL CARE, AND HEATING?: NOT HARD AT ALL

## 2021-09-03 NOTE — PROGRESS NOTES
Post-Discharge Transitional Care Management Services or Hospital Follow Up      Satnam Carroll   YOB: 1950    Date of Office Visit:  9/3/2021  Date of Hospital Admission: 8/22/21  Date of Hospital Discharge: 8/23/21  Readmission Risk Score(high >=14%.  Medium >=10%):No data recorded    Care management risk score Rising risk (score 2-5) and Complex Care (Scores >=6): 1     Non face to face  following discharge, date last encounter closed (first attempt may have been earlier): 8/24/2021 11:34 AM 8/24/2021 11:34 AM    Call initiated 2 business days of discharge: Yes     Patient Active Problem List   Diagnosis    Hypertension    Degenerative arthritis of cervical spine    Osteoarthritis    Chest pain syndrome    Heart palpitations    Primary osteoarthritis of left hip    Gastroesophageal reflux disease    Acute chest pain       Allergies   Allergen Reactions    Minocycline Swelling    Sulfa Antibiotics Swelling    Bactrim Diarrhea    Erythromycin Diarrhea    Norco [Hydrocodone-Acetaminophen] Other (See Comments)     Headache    Nortriptyline Itching    Vicodin [Hydrocodone-Acetaminophen] Other (See Comments)     Headache.  severe       Medications listed as ordered at the time of discharge from [facility] Medication Instructions ELISA:    Printed on:09/03/21 1007   Medication Information                      amLODIPine (NORVASC) 2.5 MG tablet  Take 1 tablet by mouth daily             Ascorbic Acid (VITAMIN C) 1000 MG tablet  Take 1,000 mg by mouth daily              aspirin 81 MG tablet  Take 1 tablet by mouth daily             atenolol-chlorthalidone (TENORETIC) 50-25 MG per tablet  TAKE 1 TABLET BY MOUTH  TWICE DAILY             CALCIUM CITRATE PO  Take 600 mg by mouth 2 times daily              Cholecalciferol (VITAMIN D3) 50 MCG (2000 UT) CAPS  Take by mouth             clindamycin (CLEOCIN) 150 MG capsule  Before dental procedures             hydroxychloroquine (PLAQUENIL) 200 MG tablet  Take 200 mg by mouth nightly              ketoconazole (NIZORAL) 2 % cream  APPLY 1 APPLICATION TOPICALLY TO RIGHT FOOT TWICE DAILY FOR 4 WEEKS THEN A FEW DAYS PER WEEK AS PREVENTION. Multiple Vitamin TABS  Take by mouth every morning             nitroGLYCERIN (NITROSTAT) 0.4 MG SL tablet  up to max of 3 total doses. If no relief after 1 dose, call 911. NONFORMULARY  Take 2 tablets by mouth daily Eye promise             omeprazole (PRILOSEC) 20 MG capsule  Take 1 capsule by mouth daily              potassium chloride (KLOR-CON M) 10 MEQ extended release tablet  Take 1 tablet by mouth 2 times daily             tiZANidine (ZANAFLEX) 4 MG tablet  Take 8 mg by mouth nightly              zinc gluconate 50 MG tablet  Take 50 mg by mouth daily                   Medications marked \"taking\" at this time  Outpatient Medications Marked as Taking for the 9/3/21 encounter (Office Visit) with Giulia Granda MD   Medication Sig Dispense Refill    Cholecalciferol (VITAMIN D3) 50 MCG (2000 UT) CAPS Take by mouth      zinc gluconate 50 MG tablet Take 50 mg by mouth daily      nitroGLYCERIN (NITROSTAT) 0.4 MG SL tablet up to max of 3 total doses. If no relief after 1 dose, call 911. 25 tablet 3    potassium chloride (KLOR-CON M) 10 MEQ extended release tablet Take 1 tablet by mouth 2 times daily 180 tablet 1    atenolol-chlorthalidone (TENORETIC) 50-25 MG per tablet TAKE 1 TABLET BY MOUTH  TWICE DAILY 180 tablet 1    clindamycin (CLEOCIN) 150 MG capsule Before dental procedures      amLODIPine (NORVASC) 2.5 MG tablet Take 1 tablet by mouth daily 90 tablet 1    hydroxychloroquine (PLAQUENIL) 200 MG tablet Take 200 mg by mouth nightly       ketoconazole (NIZORAL) 2 % cream APPLY 1 APPLICATION TOPICALLY TO RIGHT FOOT TWICE DAILY FOR 4 WEEKS THEN A FEW DAYS PER WEEK AS PREVENTION.       tiZANidine (ZANAFLEX) 4 MG tablet Take 8 mg by mouth nightly       aspirin 81 MG tablet Take 1 tablet by mouth daily (Patient taking differently: Take 81 mg by mouth nightly ) 30 tablet 3    NONFORMULARY Take 2 tablets by mouth daily Eye promise      Multiple Vitamin TABS Take by mouth every morning      CALCIUM CITRATE PO Take 600 mg by mouth 2 times daily       omeprazole (PRILOSEC) 20 MG capsule Take 1 capsule by mouth daily   2    Ascorbic Acid (VITAMIN C) 1000 MG tablet Take 1,000 mg by mouth daily           Medications patient taking as of now reconciled against medications ordered at time of hospital discharge: Yes    Chief Complaint   Patient presents with    Follow-Up from Hospital       HPI    Inpatient course: Discharge summary reviewed- see chart. Interval history/Current status: she went to the hospital because she was having some pain in her chest, and her left arm she was concerned that it could be from her heart. She was under observation and had a cardiac workup was unrevealing. She just had a stress test on Tuesday but we do not have the report yet. She stated that she realized that he was from her therapy,     Review of Systems   Constitutional: Negative for activity change, appetite change, diaphoresis, fatigue and fever. HENT: Negative. Eyes: Negative. Respiratory: Negative for cough, chest tightness and shortness of breath. Cardiovascular: Negative for chest pain, palpitations and leg swelling. Gastrointestinal: Negative for abdominal pain, constipation, diarrhea, nausea and vomiting. Genitourinary: Negative. Musculoskeletal: Positive for arthralgias and back pain. Skin: Negative. Negative for rash. Neurological: Negative for dizziness, syncope, weakness, light-headedness, numbness and headaches. Psychiatric/Behavioral: Positive for sleep disturbance (she wakes up in the middle of the night and it is hard to go back to sleep If she takes Melatonin it does help. ).        Vitals:    09/03/21 0925   BP: 136/72   Site: Right Upper Arm   Position: Sitting   Cuff Size: Medium Adult   Pulse: 74   Resp: 16   Temp: 96.7 °F (35.9 °C)   TempSrc: Skin   Weight: 167 lb 6 oz (75.9 kg)   Height: 5' 6\" (1.676 m)     Body mass index is 27.02 kg/m². Wt Readings from Last 3 Encounters:   09/03/21 167 lb 6 oz (75.9 kg)   08/22/21 166 lb 11.2 oz (75.6 kg)   05/14/21 171 lb (77.6 kg)     BP Readings from Last 3 Encounters:   09/03/21 136/72   08/23/21 137/77   05/14/21 132/78       Physical Exam  Vitals and nursing note reviewed. Constitutional:       General: She is not in acute distress. Appearance: She is well-developed. She is not diaphoretic. HENT:      Head: Normocephalic and atraumatic. Eyes:      General: No scleral icterus. Right eye: No discharge. Left eye: No discharge. Conjunctiva/sclera: Conjunctivae normal.      Pupils: Pupils are equal, round, and reactive to light. Neck:      Thyroid: No thyromegaly. Vascular: No JVD. Cardiovascular:      Rate and Rhythm: Normal rate and regular rhythm. Heart sounds: Normal heart sounds. No murmur heard. Pulmonary:      Effort: No respiratory distress. Breath sounds: Normal breath sounds. No wheezing, rhonchi or rales. Abdominal:      General: Bowel sounds are normal. There is no distension. Palpations: Abdomen is soft. There is no mass. Tenderness: There is no abdominal tenderness. There is no guarding or rebound. Musculoskeletal:         General: Normal range of motion. Cervical back: Normal range of motion and neck supple. Lymphadenopathy:      Cervical: No cervical adenopathy. Skin:     General: Skin is warm and dry. Findings: No rash. Neurological:      Mental Status: She is alert and oriented to person, place, and time. Psychiatric:         Behavior: Behavior normal.        Diagnosis Orders   1. Essential hypertension  Basic Metabolic Panel   2. Hypomagnesemia  Magnesium   3.  Hypokalemia  Basic Metabolic Panel       Requested Prescriptions      No prescriptions requested or ordered in this encounter     Current Outpatient Medications   Medication Sig Dispense Refill    Cholecalciferol (VITAMIN D3) 50 MCG (2000 UT) CAPS Take by mouth      zinc gluconate 50 MG tablet Take 50 mg by mouth daily      nitroGLYCERIN (NITROSTAT) 0.4 MG SL tablet up to max of 3 total doses. If no relief after 1 dose, call 911. 25 tablet 3    potassium chloride (KLOR-CON M) 10 MEQ extended release tablet Take 1 tablet by mouth 2 times daily 180 tablet 1    atenolol-chlorthalidone (TENORETIC) 50-25 MG per tablet TAKE 1 TABLET BY MOUTH  TWICE DAILY 180 tablet 1    clindamycin (CLEOCIN) 150 MG capsule Before dental procedures      amLODIPine (NORVASC) 2.5 MG tablet Take 1 tablet by mouth daily 90 tablet 1    hydroxychloroquine (PLAQUENIL) 200 MG tablet Take 200 mg by mouth nightly       ketoconazole (NIZORAL) 2 % cream APPLY 1 APPLICATION TOPICALLY TO RIGHT FOOT TWICE DAILY FOR 4 WEEKS THEN A FEW DAYS PER WEEK AS PREVENTION.  tiZANidine (ZANAFLEX) 4 MG tablet Take 8 mg by mouth nightly       aspirin 81 MG tablet Take 1 tablet by mouth daily (Patient taking differently: Take 81 mg by mouth nightly ) 30 tablet 3    NONFORMULARY Take 2 tablets by mouth daily Eye promise      Multiple Vitamin TABS Take by mouth every morning      CALCIUM CITRATE PO Take 600 mg by mouth 2 times daily       omeprazole (PRILOSEC) 20 MG capsule Take 1 capsule by mouth daily   2    Ascorbic Acid (VITAMIN C) 1000 MG tablet Take 1,000 mg by mouth daily        No current facility-administered medications for this visit. Orders Placed This Encounter   Procedures    Basic Metabolic Panel     Standing Status:   Future     Standing Expiration Date:   9/3/2022    Magnesium     Standing Status:   Future     Standing Expiration Date:   9/3/2022     Continue current  medications. Return in about 3 months (around 12/3/2021) for HTN.     Medical Decision Making: moderate complexity

## 2021-09-08 ENCOUNTER — CARE COORDINATION (OUTPATIENT)
Dept: FAMILY MEDICINE CLINIC | Age: 71
End: 2021-09-08

## 2021-09-08 NOTE — CARE COORDINATION
Carmelina Jauregui is doing well. She has no home needs and is feeling good. She thanked me for calling to check on her.

## 2021-09-10 LAB
ANION GAP SERPL CALCULATED.3IONS-SCNC: 10 MMOL/L (ref 5–15)
BUN BLDV-MCNC: 18 MG/DL (ref 5–27)
CALCIUM SERPL-MCNC: 10.2 MG/DL (ref 8.5–10.5)
CHLORIDE BLD-SCNC: 98 MMOL/L (ref 98–109)
CO2: 33 MMOL/L (ref 22–32)
CREAT SERPL-MCNC: 0.81 MG/DL (ref 0.4–1)
EGFR AFRICAN AMERICAN: >60 ML/MIN/1.73SQ.M
EGFR IF NONAFRICAN AMERICAN: >60 ML/MIN/1.73SQ.M
GLUCOSE: 96 MG/DL (ref 65–99)
MAGNESIUM: 1.2 MG/DL (ref 1.8–2.6)
POTASSIUM SERPL-SCNC: 4.2 MMOL/L (ref 3.5–5)
SODIUM BLD-SCNC: 141 MMOL/L (ref 134–146)

## 2021-09-14 ENCOUNTER — TELEPHONE (OUTPATIENT)
Dept: FAMILY MEDICINE CLINIC | Age: 71
End: 2021-09-14

## 2021-09-14 DIAGNOSIS — E83.42 HYPOMAGNESEMIA: Primary | ICD-10-CM

## 2021-09-14 NOTE — TELEPHONE ENCOUNTER
----- Message from Parth Sampson MD sent at 9/13/2021  4:48 PM EDT -----  Please verify if she is taking magnesium orally?

## 2021-09-15 ENCOUNTER — CARE COORDINATION (OUTPATIENT)
Dept: FAMILY MEDICINE CLINIC | Age: 71
End: 2021-09-15

## 2021-09-15 RX ORDER — CALCIUM CARBONATE/VITAMIN D3 500-10/5ML
1 LIQUID (ML) ORAL DAILY
Qty: 30 CAPSULE | Refills: 3 | Status: SHIPPED | OUTPATIENT
Start: 2021-09-15 | End: 2021-10-04 | Stop reason: SDUPTHER

## 2021-09-15 NOTE — CARE COORDINATION
Mary Lou Tadeo is doing well. She has no c/o or needs and no chest pain. She had her stress test and has a follow up with cardiology on the 30th. She said it must be pretty much OK or they would want to see her sooner. She will  her magnesium and get her follow up lab test as requested. She said she is feeling good. She said that she has not had to take magnesium before. I explained to her that keeping the levels in range help her heart beat more effectively. There are minerals that work in tandem to keep the heart beating correctly and magnesium is one of them. She thanked me for checking on her.

## 2021-09-21 ENCOUNTER — CARE COORDINATION (OUTPATIENT)
Dept: FAMILY MEDICINE CLINIC | Age: 71
End: 2021-09-21

## 2021-09-21 NOTE — CARE COORDINATION
Porsha Hayes is doing fine. She has no additional needs at this time. I informed her that I am here every day during the week from 9-2:30 if she has any questions or needs. She thanked me for calling and checking on her.

## 2021-09-24 LAB — MAGNESIUM: 1.3 MG/DL (ref 1.8–2.6)

## 2021-09-27 ENCOUNTER — TELEPHONE (OUTPATIENT)
Dept: FAMILY MEDICINE CLINIC | Age: 71
End: 2021-09-27

## 2021-09-27 DIAGNOSIS — R89.9 ABNORMAL LABORATORY TEST RESULT: Primary | ICD-10-CM

## 2021-09-27 NOTE — TELEPHONE ENCOUNTER
----- Message from Samir Reynoso MD sent at 9/27/2021  1:06 PM EDT -----  Please let her know that her magnesium is still low and we need her to increase her magnesium to twice daily  Repeat magnesium level in 1 week

## 2021-10-04 RX ORDER — CALCIUM CARBONATE/VITAMIN D3 500-10/5ML
1 LIQUID (ML) ORAL DAILY
Qty: 60 CAPSULE | Refills: 0 | Status: SHIPPED | OUTPATIENT
Start: 2021-10-04 | End: 2021-10-13 | Stop reason: SDUPTHER

## 2021-10-04 NOTE — TELEPHONE ENCOUNTER
Date of last visit:  9/3/2021  Date of next visit:  12/3/2021    Stated she needs a new RX since she is taking it bid instead of qd.     Requested Prescriptions     Pending Prescriptions Disp Refills    Magnesium Oxide 400 MG CAPS 60 capsule 0     Sig: Take 1 capsule by mouth daily

## 2021-10-05 LAB — MAGNESIUM: 1.4 MG/DL (ref 1.8–2.6)

## 2021-10-07 ENCOUNTER — TELEPHONE (OUTPATIENT)
Dept: FAMILY MEDICINE CLINIC | Age: 71
End: 2021-10-07

## 2021-10-07 DIAGNOSIS — E83.42 HYPOMAGNESEMIA: Primary | ICD-10-CM

## 2021-10-07 NOTE — TELEPHONE ENCOUNTER
Rossy Fajardo informed by Phone. Stated she is taking it BID but had only started that about 4 days prior to the redraw.

## 2021-10-07 NOTE — TELEPHONE ENCOUNTER
----- Message from Jacob Philippe MD sent at 10/6/2021  3:56 PM EDT -----  Please tell her that her magnesium is still low  Verify if she increased to BID

## 2021-10-12 LAB — MAGNESIUM: 1.4 MG/DL (ref 1.8–2.6)

## 2021-10-12 RX ORDER — AMLODIPINE BESYLATE 2.5 MG/1
2.5 TABLET ORAL DAILY
Qty: 90 TABLET | Refills: 1 | Status: SHIPPED | OUTPATIENT
Start: 2021-10-12 | End: 2021-11-12 | Stop reason: SDUPTHER

## 2021-10-12 NOTE — TELEPHONE ENCOUNTER
Date of last visit:  9/3/2021   Date of next visit:  12/3/2021    Requested Prescriptions     Pending Prescriptions Disp Refills    amLODIPine (NORVASC) 2.5 MG tablet [Pharmacy Med Name: amLODIPine Besylate 2.5 MG Oral Tablet] 90 tablet 1     Sig: TAKE 1 TABLET BY MOUTH  DAILY

## 2021-10-13 ENCOUNTER — TELEPHONE (OUTPATIENT)
Dept: FAMILY MEDICINE CLINIC | Age: 71
End: 2021-10-13

## 2021-10-13 DIAGNOSIS — E83.42 HYPOMAGNESEMIA: Primary | ICD-10-CM

## 2021-10-13 RX ORDER — CALCIUM CARBONATE/VITAMIN D3 500-10/5ML
1 LIQUID (ML) ORAL 3 TIMES DAILY
Qty: 90 CAPSULE | Refills: 1 | Status: SHIPPED | OUTPATIENT
Start: 2021-10-13 | End: 2021-12-06 | Stop reason: SDUPTHER

## 2021-10-13 NOTE — TELEPHONE ENCOUNTER
----- Message from Rd Duran MD sent at 10/13/2021  4:03 PM EDT -----  Please let her know that her magnesium is still low. Does she have any problems with diarrhea, is she still taking Omeprazole does she drinks any alcohol? I think her low magnesium if from her diuretic and omeprazole if she is still taking it?

## 2021-10-13 NOTE — TELEPHONE ENCOUNTER
Sam Goltz informed by Phone. Stated she doesn't drink but is taking the atenolol-chlorthalidone and omeprazole. Please advise.

## 2021-10-20 LAB — MAGNESIUM: 1.5 MG/DL (ref 1.8–2.6)

## 2021-11-05 ENCOUNTER — TELEPHONE (OUTPATIENT)
Dept: FAMILY MEDICINE CLINIC | Age: 71
End: 2021-11-05

## 2021-11-05 NOTE — TELEPHONE ENCOUNTER
----- Message from Mert Antunez MD sent at 11/5/2021  8:26 AM EDT -----  Please schedule follow up appt.

## 2021-11-12 ENCOUNTER — OFFICE VISIT (OUTPATIENT)
Dept: FAMILY MEDICINE CLINIC | Age: 71
End: 2021-11-12

## 2021-11-12 VITALS
HEIGHT: 66 IN | RESPIRATION RATE: 16 BRPM | HEART RATE: 72 BPM | WEIGHT: 171.5 LBS | BODY MASS INDEX: 27.56 KG/M2 | DIASTOLIC BLOOD PRESSURE: 82 MMHG | SYSTOLIC BLOOD PRESSURE: 142 MMHG

## 2021-11-12 DIAGNOSIS — E87.6 HYPOKALEMIA: ICD-10-CM

## 2021-11-12 DIAGNOSIS — I10 ESSENTIAL HYPERTENSION: ICD-10-CM

## 2021-11-12 DIAGNOSIS — E83.42 HYPOMAGNESEMIA: ICD-10-CM

## 2021-11-12 PROCEDURE — 1123F ACP DISCUSS/DSCN MKR DOCD: CPT | Performed by: FAMILY MEDICINE

## 2021-11-12 PROCEDURE — 4040F PNEUMOC VAC/ADMIN/RCVD: CPT | Performed by: FAMILY MEDICINE

## 2021-11-12 PROCEDURE — 1090F PRES/ABSN URINE INCON ASSESS: CPT | Performed by: FAMILY MEDICINE

## 2021-11-12 PROCEDURE — 1036F TOBACCO NON-USER: CPT | Performed by: FAMILY MEDICINE

## 2021-11-12 PROCEDURE — 99213 OFFICE O/P EST LOW 20 MIN: CPT | Performed by: FAMILY MEDICINE

## 2021-11-12 PROCEDURE — G8399 PT W/DXA RESULTS DOCUMENT: HCPCS | Performed by: FAMILY MEDICINE

## 2021-11-12 PROCEDURE — G8417 CALC BMI ABV UP PARAM F/U: HCPCS | Performed by: FAMILY MEDICINE

## 2021-11-12 PROCEDURE — G8427 DOCREV CUR MEDS BY ELIG CLIN: HCPCS | Performed by: FAMILY MEDICINE

## 2021-11-12 PROCEDURE — 3017F COLORECTAL CA SCREEN DOC REV: CPT | Performed by: FAMILY MEDICINE

## 2021-11-12 RX ORDER — AMLODIPINE BESYLATE 2.5 MG/1
2.5 TABLET ORAL DAILY
Qty: 90 TABLET | Refills: 1 | Status: SHIPPED | OUTPATIENT
Start: 2021-11-12 | End: 2022-02-28 | Stop reason: SDUPTHER

## 2021-11-12 RX ORDER — ATENOLOL 50 MG/1
50 TABLET ORAL 2 TIMES DAILY
Qty: 60 TABLET | Refills: 1 | Status: SHIPPED | OUTPATIENT
Start: 2021-11-12 | End: 2022-01-31

## 2021-11-12 ASSESSMENT — ENCOUNTER SYMPTOMS
SHORTNESS OF BREATH: 0
DIARRHEA: 0
NAUSEA: 0
CHEST TIGHTNESS: 0
EYES NEGATIVE: 1
VOMITING: 0
ABDOMINAL PAIN: 0
COUGH: 0
CONSTIPATION: 0

## 2021-11-12 ASSESSMENT — PATIENT HEALTH QUESTIONNAIRE - PHQ9
SUM OF ALL RESPONSES TO PHQ9 QUESTIONS 1 & 2: 0
2. FEELING DOWN, DEPRESSED OR HOPELESS: 0
1. LITTLE INTEREST OR PLEASURE IN DOING THINGS: 0
SUM OF ALL RESPONSES TO PHQ QUESTIONS 1-9: 0

## 2021-11-12 ASSESSMENT — LIFESTYLE VARIABLES: HOW OFTEN DO YOU HAVE A DRINK CONTAINING ALCOHOL: 0

## 2021-11-12 NOTE — PROGRESS NOTES
Medicare Annual Wellness Visit  Name: Raheem Reyes Date: 2021   MRN: [id number] Sex: Female   Age: 66 y.o. Ethnicity: Non- / Non    : 1950 Race: White (non-)      Renetta Burkett is here for Check-Up, Hypertension, and Medicare AWV    Screenings for behavioral, psychosocial and functional/safety risks, and cognitive dysfunction are all negative except as indicated below. These results, as well as other patient data from the [address] form, are documented in Flowsheets linked to this Encounter. Allergies   Allergen Reactions    Minocycline Swelling    Sulfa Antibiotics Swelling    Bactrim Diarrhea    Erythromycin Diarrhea    Norco [Hydrocodone-Acetaminophen] Other (See Comments)     Headache    Nortriptyline Itching    Vicodin [Hydrocodone-Acetaminophen] Other (See Comments)     Headache.  severe         Prior to Visit Medications    Medication Sig Taking? Authorizing Provider   amLODIPine (NORVASC) 2.5 MG tablet Take 1 tablet by mouth daily Yes Melissa Meredith MD   atenolol (TENORMIN) 50 MG tablet Take 1 tablet by mouth 2 times daily Yes Melissa Meredith MD   Magnesium Oxide 400 MG CAPS Take 1 capsule by mouth 3 times daily  Melissa Meredith MD   Cholecalciferol (VITAMIN D3) 50 MCG (2000) CAPS Take by mouth  Historical Provider, MD   zinc gluconate 50 MG tablet Take 50 mg by mouth daily  Historical Provider, MD   nitroGLYCERIN (NITROSTAT) 0.4 MG SL tablet up to max of 3 total doses. If no relief after 1 dose, call 911.   Latha Mercer MD   potassium chloride (KLOR-CON M) 10 MEQ extended release tablet Take 1 tablet by mouth 2 times daily  Latha Mercer MD   atenolol-chlorthalidone (TENORETIC) 50-25 MG per tablet TAKE 1 TABLET BY MOUTH  TWICE DAILY  Melissa Meredith MD   clindamycin (CLEOCIN) 150 MG capsule Before dental procedures  Historical Provider, MD   hydroxychloroquine (PLAQUENIL) 200 MG tablet Take 200 mg by mouth nightly   Historical Provider, MD   ketoconazole (NIZORAL) 2 % cream APPLY 1 APPLICATION TOPICALLY TO RIGHT FOOT TWICE DAILY FOR 4 WEEKS THEN A FEW DAYS PER WEEK AS PREVENTION.   Historical Provider, MD   tiZANidine (ZANAFLEX) 4 MG tablet Take 8 mg by mouth nightly   Historical Provider, MD   aspirin 81 MG tablet Take 1 tablet by mouth daily  Patient taking differently: Take 81 mg by mouth nightly   Alina Oro MD   NONFORMULARY Take 2 tablets by mouth daily Eye promise  Historical Provider, MD   Multiple Vitamin TABS Take by mouth every morning  Historical Provider, MD   CALCIUM CITRATE PO Take 600 mg by mouth 2 times daily   Historical Provider, MD   omeprazole (PRILOSEC) 20 MG capsule Take 1 capsule by mouth daily   Historical Provider, MD   Ascorbic Acid (VITAMIN C) 1000 MG tablet Take 1,000 mg by mouth daily   Historical Provider, MD         Past Medical History:   Diagnosis Date    Cervical radiculopathy     Degenerative arthritis of cervical spine     Degenerative lumbar disc     Fibromyalgia     GERD (gastroesophageal reflux disease)     Hydronephrosis 02/11/2016    right kidney    Hypertension     Osteoarthritis     neck,hands    Spinal stenosis     UPJ (ureteropelvic junction) obstruction 06/22/2002    Vitreous detachment of left eye 1996    Vitreous detachment of right eye 2005       Past Surgical History:   Procedure Laterality Date    BACK SURGERY  05/19/2017    L2-5 Decompression L2-5 posterior fusion and tlif by Dr Verlie Oppenheim  08/30/2004    biopsy, Dr. Selin Aldrich  02/07/2015    CARDIOVASCULAR STRESS TEST  2014    CATARACT REMOVAL WITH IMPLANT Left 04/25/2016    CATARACT REMOVAL WITH IMPLANT Right 05/09/2016    CERVICAL DISC SURGERY  01/18/2008    C5-6 discectomy, Dr. Edenilson Gray COLONOSCOPY  10/02 10/05 11/10   [id number]    Dr. Allan Gresham, [facility]    HIP ARTHROSCOPY Right 07/17/2014    labrum repair and PSCAS Arm Left Upper Arm   Position: Sitting Sitting   Cuff Size: Medium Adult Medium Adult   Pulse: 72    Resp: 16    Weight: 171 lb 8 oz (77.8 kg)    Height: 5' 6\" (1.676 m)      Body mass index is 27.68 kg/m². Based upon direct observation of the patient, evaluation of cognition reveals recent and remote memory intact. Patient's complete Health Risk Assessment and screening values have been reviewed and are found in Flowsheets. The following problems were reviewed today and where indicated follow up appointments were made and/or referrals ordered. Positive Risk Factor Screenings with Interventions:            General Health and ACP:  General  In general, how would you say your health is?: Good  In the past 7 days, have you experienced any of the following? New or Increased Pain, New or Increased Fatigue, Loneliness, Social Isolation, Stress or Anger?: (!) New or Increased Pain  Do you get the social and emotional support that you need?: Yes  Do you have a Living Will?: Yes  Advance Directives     Power of  Living Will ACP-Advance Directive ACP-Power of     Not on File Filed on 05/24/17 Filed Not on File      General Health Risk Interventions:  · Pain issues: home exercises provided, she has a lot of arthritic pain.          Personalized Preventive Plan   Current Health Maintenance Status  Immunization History   Administered Date(s) Administered    Influenza Vaccine, unspecified formulation 10/10/2018    Influenza Virus Vaccine 11/09/2015    Influenza Whole 11/09/2015    Influenza, High Dose (Fluzone 65 yrs and older) 10/09/2018, 10/09/2019, 10/06/2020    Influenza, High-dose, Quadv, 65 yrs +, IM (Fluzone) 10/06/2020    Influenza, MDCK Quadv, IM, PF (Flucelvax 2 yrs and older) 10/27/2017    Influenza, Quadv, IM, PF (6 mo and older Fluzone, Flulaval, Fluarix, and 3 yrs and older Afluria) 10/05/2016    Pneumococcal Conjugate 13-valent ([id number]) 10/05/2016    Pneumococcal Polysaccharide ([id number]) 10/27/2017    Td, unspecified formulation 06/06/2005    Tdap (Boostrix, Adacel) 12/02/2015    Zoster Recombinant (Shingrix) 10/06/2020, 12/14/2020        Health Maintenance   Topic Date Due    Hepatitis C screen  Never done    Annual Wellness Visit (AWV)  05/02/2021    Flu vaccine (1) 09/01/2021    COVID-19 Vaccine (1) 09/03/2023 (Originally 10/11/1962)    Breast cancer screen  04/15/2022    DTaP/Tdap/Td vaccine (2 - Td or Tdap) 12/02/2025    Colon cancer screen colonoscopy  12/16/2025    Lipid screen  08/23/2026    DEXA (modify frequency per FRAX score)  Completed    Shingles Vaccine  Completed    Pneumococcal 65+ years Vaccine  Completed    Hepatitis A vaccine  Aged Out    Hepatitis B vaccine  Aged Out    Hib vaccine  Aged Out    Meningococcal (ACWY) vaccine  Aged Out     Recommendations for Asktourism Due: see orders and patient instructions/AVS.  . Recommended screening schedule for the next 5-10 years is provided to the patient in written form: see Patient Anderson Dave was seen today for check-up, hypertension and medicare awv. Diagnoses and all orders for this visit:    Essential hypertension    Hypomagnesemia  -     Basic Metabolic Panel; Future    Hypokalemia  -     Basic Metabolic Panel; Future    Other orders  -     amLODIPine (NORVASC) 2.5 MG tablet; Take 1 tablet by mouth daily  -     atenolol (TENORMIN) 50 MG tablet; Take 1 tablet by mouth 2 times daily             Advance Care Planning   Advanced Care Planning: Discussed the patients choices for care and treatment in case of a health event that adversely affects decision-making abilities. Also discussed the patients long-term treatment options.  Reviewed with the patient the [facility] of [facility] Will Declaration forms  Reviewed the process of designating a competent adult as an Agent (or -in-fact) that could take make health care decisions for the patient if incompetent. Patient was asked to complete the declaration forms, either acknowledge                     Date: 11/12/2021    Iveth Wheeler is a 66 y.o. female who presents today for:  Chief Complaint   Patient presents with    Check-Up    Hypertension    Medicare AWV       HPI:     Hypertension  This is a chronic problem. The current episode started more than 1 year ago. The problem is unchanged. The problem is controlled. Pertinent negatives include no anxiety, chest pain, headaches, neck pain, palpitations, peripheral edema, PND or shortness of breath. Risk factors for coronary artery disease include post-menopausal state. Past treatments include calcium channel blockers, beta blockers and diuretics. The current treatment provides significant improvement. There are no compliance problems. has a current medication list which includes the following prescription(s): amlodipine, atenolol, magnesium oxide, vitamin d3, zinc gluconate, nitroglycerin, potassium chloride, atenolol-chlorthalidone, clindamycin, hydroxychloroquine, ketoconazole, tizanidine, aspirin, NONFORMULARY, multiple vitamin, calcium citrate, omeprazole, and vitamin c.     Allergies   Allergen Reactions    Minocycline Swelling    Sulfa Antibiotics Swelling    Bactrim Diarrhea    Erythromycin Diarrhea    Norco [Hydrocodone-Acetaminophen] Other (See Comments)     Headache    Nortriptyline Itching    Vicodin [Hydrocodone-Acetaminophen] Other (See Comments)     Headache.  severe       Social History     Tobacco Use    Smoking status: Never Smoker    Smokeless tobacco: Never Used   Vaping Use    Vaping Use: Never used   Substance Use Topics    Alcohol use: No    Drug use: No       Past Medical History:   Diagnosis Date    Cervical radiculopathy     Degenerative arthritis of cervical spine     Degenerative lumbar disc     Fibromyalgia     GERD (gastroesophageal reflux disease)     Hydronephrosis 02/11/2016    right kidney    Hypertension     Osteoarthritis     neck,hands    Spinal stenosis     UPJ (ureteropelvic junction) obstruction 2002    Vitreous detachment of left eye     Vitreous detachment of right eye        Past Surgical History:   Procedure Laterality Date    BACK SURGERY  2017    L2-5 Decompression L2-5 posterior fusion and tlif by Dr Guerrero  2004    biopsy, Dr. Lisa Styles  2015    CARDIOVASCULAR STRESS TEST  2014    CATARACT REMOVAL WITH IMPLANT Left 2016    CATARACT REMOVAL WITH IMPLANT Right 2016    CERVICAL [address] SURGERY  2008    C5-6 discectomy, Dr. Jhonathan Oconnell Mohini Crumbly  10/02 10/05 11/10   [facility]    Dr. Osmin Horan, [facility]    HIP ARTHROSCOPY Right 2014    labrum repair and PSCAS lengthening - Dr. Suarez [facility]  2001    Dr. Lora Ha - [place] Right 2014    TOTAL HIP, DR. Hortensia Morales - [facility]    JOINT REPLACEMENT Left 10/22/2018    LUMBAR [address] SURGERY  2000    [facility]  [facility]    L3-4 with cage, Dr. Gemma Carrizales  [facility]  2016    radiofrequency ablation right SI joint - Dr. Denise Solano Right 2016    right SI joint    [address] Right 2012    Dr. Leny Henson Left 2014    Dr. Tai Loomis    [facility] ARTHROPLASTY Left 2017    LEFT TOTAL HIP ARTHROPLASTY performed by Mirna Klein MD at [address] [place] [address]   [facility]    [facility]  D[facility]T ureteropelvic junction obstruction, hydronephrosis       Family History   Problem Relation Age of Onset    Heart Disease Mother     Arthritis Father     Kidney Disease Father     Other Father         MDS   80 y/o    Heart Disease Father 61        cabg    Cancer Father         kidney    Stroke Paternal Grandmother     Diabetes Paternal Grandmother     High Blood Pressure Sister     High Cholesterol Sister     Breast Cancer Maternal Cousin     Breast Cancer Maternal Cousin     Breast Cancer Maternal Cousin     Breast Cancer Maternal Cousin     Breast Cancer Maternal Cousin 68     Subjective:     Review of Systems   Constitutional: Negative for activity change, appetite change, diaphoresis, fatigue and fever. HENT: Negative. Eyes: Negative. Respiratory: Negative for cough, chest tightness and shortness of breath. Cardiovascular: Negative for chest pain, palpitations, leg swelling and PND. Gastrointestinal: Negative for abdominal pain, constipation, diarrhea, nausea and vomiting. Genitourinary: Negative. Musculoskeletal: Positive for arthralgias. Negative for neck pain. Skin: Negative. Negative for rash. Neurological: Negative for dizziness, syncope, weakness, light-headedness, numbness and headaches. Psychiatric/Behavioral: Negative.        :   BP (!) 142/82 (Site: Left Upper Arm, Position: Sitting, Cuff Size: Medium Adult)   Pulse 72   Resp 16   Ht 5' 6\" (1.676 m)   Wt 171 lb 8 oz (77.8 kg)   BMI 27.68 kg/m²   Wt Readings from Last 3 Encounters:   11/12/21 171 lb 8 oz (77.8 kg)   09/03/21 167 lb 6 oz (75.9 kg)   08/22/21 166 lb 11.2 oz (75.6 kg)     Physical Exam  Vitals and nursing note reviewed. Constitutional:       General: She is not in acute distress. Appearance: She is well-developed. She is not diaphoretic. HENT:      Head: Normocephalic and atraumatic. Eyes:      General: No scleral icterus. Right eye: No discharge. Left eye: No discharge. Conjunctiva/sclera: Conjunctivae normal.      Pupils: Pupils are equal, round, and reactive to light. Neck:      Thyroid: No thyromegaly. Vascular: No JVD.    Cardiovascular:      Rate and Rhythm: Normal rate and regular rhythm. Heart sounds: Normal heart sounds. No murmur heard. Pulmonary:      Effort: No respiratory distress. Breath sounds: Normal breath sounds. No wheezing, rhonchi or rales. Abdominal:      General: Bowel sounds are normal. There is no distension. Palpations: Abdomen is soft. There is no mass. Tenderness: There is no abdominal tenderness. There is no guarding or rebound. Musculoskeletal:         General: Normal range of motion. Cervical back: Normal range of motion and neck supple. Lymphadenopathy:      Cervical: No cervical adenopathy. Skin:     General: Skin is warm and dry. Findings: No rash. Neurological:      Mental Status: She is alert and oriented to person, place, and time. Psychiatric:         Behavior: Behavior normal.       :       Diagnosis Orders   1. Essential hypertension  amLODIPine (NORVASC) 2.5 MG tablet    atenolol (TENORMIN) 50 MG tablet   2. Hypomagnesemia  Magnesium   3. Hypokalemia  Basic Metabolic Panel       :      Requested Prescriptions     Signed Prescriptions Disp Refills    amLODIPine (NORVASC) 2.5 MG tablet 90 tablet 1     Sig: Take 1 tablet by mouth daily    atenolol (TENORMIN) 50 MG tablet 60 tablet 1     Sig: Take 1 tablet by mouth 2 times daily     Current Outpatient Medications   Medication Sig Dispense Refill    amLODIPine (NORVASC) 2.5 MG tablet Take 1 tablet by mouth daily 90 tablet 1    atenolol (TENORMIN) 50 MG tablet Take 1 tablet by mouth 2 times daily 60 tablet 1    Magnesium Oxide 400 MG CAPS Take 1 capsule by mouth 3 times daily 90 capsule 1    Cholecalciferol (VITAMIN D3) 50 MCG (2000 UT) CAPS Take by mouth      zinc gluconate 50 MG tablet Take 50 mg by mouth daily      nitroGLYCERIN (NITROSTAT) 0.4 MG SL tablet up to max of 3 total doses.  If no relief after 1 dose, call 911. 25 tablet 3    potassium chloride (KLOR-CON M) 10 MEQ extended release tablet Take 1 tablet by mouth 2 times daily 180 tablet 1    atenolol-chlorthalidone (TENORETIC) 50-25 MG per tablet TAKE 1 TABLET BY MOUTH  TWICE DAILY 180 tablet 1    clindamycin (CLEOCIN) 150 MG capsule Before dental procedures      hydroxychloroquine (PLAQUENIL) 200 MG tablet Take 200 mg by mouth nightly       ketoconazole (NIZORAL) 2 % cream APPLY 1 APPLICATION TOPICALLY TO RIGHT FOOT TWICE DAILY FOR 4 WEEKS THEN A FEW DAYS PER WEEK AS PREVENTION.  tiZANidine (ZANAFLEX) 4 MG tablet Take 8 mg by mouth nightly       aspirin 81 MG tablet Take 1 tablet by mouth daily (Patient taking differently: Take 81 mg by mouth nightly ) 30 tablet 3    NONFORMULARY Take 2 tablets by mouth daily Eye promise      Multiple Vitamin TABS Take by mouth every morning      CALCIUM CITRATE PO Take 600 mg by mouth 2 times daily       omeprazole (PRILOSEC) 20 MG capsule Take 1 capsule by mouth daily   2    Ascorbic Acid (VITAMIN C) 1000 MG tablet Take 1,000 mg by mouth daily        No current facility-administered medications for this visit. Orders Placed This Encounter   Procedures    Basic Metabolic Panel     Standing Status:   Future     Standing Expiration Date:   11/12/2022       Continue current medications. Return in about 2 weeks (around 11/26/2021). Discussed use, benefit, and side effects of prescribed medications. All patient questions answered. Pt voiced understanding. Instructed to continue current medications,diet and exercise. Patient agreed with treatment plan.

## 2021-11-23 LAB
ANION GAP SERPL CALCULATED.3IONS-SCNC: 8 MMOL/L (ref 5–15)
BUN BLDV-MCNC: 16 MG/DL (ref 5–27)
CALCIUM SERPL-MCNC: 10.1 MG/DL (ref 8.5–10.5)
CHLORIDE BLD-SCNC: 101 MMOL/L (ref 98–109)
CO2: 32 MMOL/L (ref 22–32)
CREAT SERPL-MCNC: 0.79 MG/DL (ref 0.4–1)
EGFR AFRICAN AMERICAN: >60 ML/MIN/1.73SQ.M
EGFR IF NONAFRICAN AMERICAN: >60 ML/MIN/1.73SQ.M
GLUCOSE: 94 MG/DL (ref 65–99)
MAGNESIUM: 1.6 MG/DL (ref 1.8–2.6)
POTASSIUM SERPL-SCNC: 4.3 MMOL/L (ref 3.5–5)
SODIUM BLD-SCNC: 141 MMOL/L (ref 134–146)

## 2021-11-24 ENCOUNTER — TELEPHONE (OUTPATIENT)
Dept: FAMILY MEDICINE CLINIC | Age: 71
End: 2021-11-24

## 2021-11-24 DIAGNOSIS — E83.42 HYPOMAGNESEMIA: Primary | ICD-10-CM

## 2021-11-24 NOTE — TELEPHONE ENCOUNTER
----- Message from aJcob Philippe MD sent at 11/24/2021  5:44 AM EST -----  Please verify if we can add a magnesium level to this blood work

## 2021-11-26 ENCOUNTER — OFFICE VISIT (OUTPATIENT)
Dept: FAMILY MEDICINE CLINIC | Age: 71
End: 2021-11-26

## 2021-11-26 ENCOUNTER — TELEPHONE (OUTPATIENT)
Dept: FAMILY MEDICINE CLINIC | Age: 71
End: 2021-11-26

## 2021-11-26 VITALS
RESPIRATION RATE: 16 BRPM | DIASTOLIC BLOOD PRESSURE: 72 MMHG | WEIGHT: 174.5 LBS | HEIGHT: 66 IN | HEART RATE: 72 BPM | TEMPERATURE: 96.3 F | SYSTOLIC BLOOD PRESSURE: 136 MMHG | BODY MASS INDEX: 28.04 KG/M2

## 2021-11-26 DIAGNOSIS — I10 ESSENTIAL HYPERTENSION: Primary | ICD-10-CM

## 2021-11-26 PROCEDURE — 1123F ACP DISCUSS/DSCN MKR DOCD: CPT | Performed by: FAMILY MEDICINE

## 2021-11-26 PROCEDURE — 3017F COLORECTAL CA SCREEN DOC REV: CPT | Performed by: FAMILY MEDICINE

## 2021-11-26 PROCEDURE — 1036F TOBACCO NON-USER: CPT | Performed by: FAMILY MEDICINE

## 2021-11-26 PROCEDURE — 4040F PNEUMOC VAC/ADMIN/RCVD: CPT | Performed by: FAMILY MEDICINE

## 2021-11-26 PROCEDURE — G8399 PT W/DXA RESULTS DOCUMENT: HCPCS | Performed by: FAMILY MEDICINE

## 2021-11-26 PROCEDURE — 99213 OFFICE O/P EST LOW 20 MIN: CPT | Performed by: FAMILY MEDICINE

## 2021-11-26 PROCEDURE — G8427 DOCREV CUR MEDS BY ELIG CLIN: HCPCS | Performed by: FAMILY MEDICINE

## 2021-11-26 PROCEDURE — 1090F PRES/ABSN URINE INCON ASSESS: CPT | Performed by: FAMILY MEDICINE

## 2021-11-26 PROCEDURE — G8417 CALC BMI ABV UP PARAM F/U: HCPCS | Performed by: FAMILY MEDICINE

## 2021-11-26 ASSESSMENT — ENCOUNTER SYMPTOMS
DIARRHEA: 0
SHORTNESS OF BREATH: 0
CONSTIPATION: 0
NAUSEA: 0
COUGH: 0
VOMITING: 0
CHEST TIGHTNESS: 0
EYES NEGATIVE: 1
ABDOMINAL PAIN: 0

## 2021-11-26 NOTE — PROGRESS NOTES
Date: 11/26/2021    Jocelyn Fernandez is a 66 y.o. female who presents today for:  Chief Complaint   Patient presents with    Check-Up    Hypertension       HPI:     HPI    has a current medication list which includes the following prescription(s): amlodipine, atenolol, magnesium oxide, vitamin d3, zinc gluconate, nitroglycerin, potassium chloride, clindamycin, hydroxychloroquine, ketoconazole, tizanidine, aspirin, NONFORMULARY, multiple vitamin, calcium citrate, omeprazole, and vitamin c.     Allergies   Allergen Reactions    Minocycline Swelling    Sulfa Antibiotics Swelling    Bactrim Diarrhea    Erythromycin Diarrhea    Norco [Hydrocodone-Acetaminophen] Other (See Comments)     Headache    Nortriptyline Itching    Vicodin [Hydrocodone-Acetaminophen] Other (See Comments)     Headache.  severe       Social History     Tobacco Use    Smoking status: Never Smoker    Smokeless tobacco: Never Used   Vaping Use    Vaping Use: Never used   Substance Use Topics    Alcohol use: No    Drug use: No       Past Medical History:   Diagnosis Date    Cervical radiculopathy     Degenerative arthritis of cervical spine     Degenerative lumbar disc     Fibromyalgia     GERD (gastroesophageal reflux disease)     Hydronephrosis 02/11/2016    right kidney    Hypertension     Osteoarthritis     neck,hands    Spinal stenosis     UPJ (ureteropelvic junction) obstruction 06/22/2002    Vitreous detachment of left eye 1996    Vitreous detachment of right eye 2005       Past Surgical History:   Procedure Laterality Date    BACK SURGERY  05/19/2017    L2-5 Decompression L2-5 posterior fusion and tlif by Dr [facility]  08/30/2004    biopsy, Dr. Carlton Black - [address] [facility]  02/07/2015    CARDIOVASCULAR STRESS TEST  2014    CATARACT REMOVAL WITH IMPLANT Left 04/25/2016    CATARACT REMOVAL WITH IMPLANT Right 05/09/2016    CERVICAL DISC SURGERY  01/18/2008    C5-6 discectomy, Dr[facility] Veronica Lofton [facility]    COLONOSCOPY  10/02 10/05 11/10    [facility]    HIP ARTHROSCOPY Right 2014    labrum repair and PSCAS lengthening - [facility]. Tho Jeffers [facility]  2001    Dr. Rosalinda Dean - [facility] Right 2014    TOTAL HIP, DR. Kathia Fagan - [facility]    JOINT REPLACEMENT Left 10/22/2018    LUMBAR [address] SURGERY  2000    L3-5 discectomy[place] [address]  2001    L3-4 with cage, Dr. Cristino Lewis [facility]  2016    radiofrequency ablation right SI joint - Dr. Chantale Florentino Right 2016    right SI joint    Cha Cherry Right 2012    Dr. Lisa Diego Left 2014    Dr. Virgen Machado    [address] Left 2017    LEFT TOTAL HIP ARTHROPLASTY performed by Torsten Krishna MD at [address] [place] [address]   2016    [facility]  D/T ureteropelvic junction obstruction, hydronephrosis       Family History   Problem Relation Age of Onset    Heart Disease Mother     Arthritis Father     Kidney Disease Father     Other Father         MDS   79 y/o    Heart Disease Father 61        cabg    Cancer Father         kidney    Stroke Paternal Grandmother     Diabetes Paternal Grandmother     High Blood Pressure Sister     High Cholesterol Sister     Breast Cancer Maternal Cousin     Breast Cancer Maternal Cousin     Breast Cancer Maternal Cousin     Breast Cancer Maternal Cousin     Breast Cancer Maternal Cousin 68     Subjective:     Review of Systems   Constitutional: Negative for activity change, appetite change, diaphoresis, fatigue and fever. HENT: Negative. Eyes: Negative. Respiratory: Negative for cough, chest tightness and shortness of breath.     Cardiovascular: Negative for chest pain, palpitations and leg swelling. Gastrointestinal: Negative for abdominal pain, constipation, diarrhea, nausea and vomiting. Genitourinary: Negative. Musculoskeletal: Positive for arthralgias. Skin: Negative. Negative for rash. Neurological: Negative for dizziness, syncope, weakness, light-headedness, numbness and headaches. Psychiatric/Behavioral: Negative.        :   /72 (Site: Left Upper Arm, Position: Sitting, Cuff Size: Large Adult)   Pulse 72   Temp 96.3 °F (35.7 °C) (Skin)   Resp 16   Ht 5' 6\" (1.676 m)   Wt 174 lb 8 oz (79.2 kg)   BMI 28.17 kg/m²   Wt Readings from Last 3 Encounters:   11/26/21 174 lb 8 oz (79.2 kg)   11/12/21 171 lb 8 oz (77.8 kg)   09/03/21 167 lb 6 oz (75.9 kg)     Physical Exam  Vitals and nursing note reviewed. Constitutional:       General: She is not in acute distress. Appearance: She is well-developed. She is not diaphoretic. HENT:      Head: Normocephalic and atraumatic. Eyes:      General: No scleral icterus. Right eye: No discharge. Left eye: No discharge. Conjunctiva/sclera: Conjunctivae normal.      Pupils: Pupils are equal, round, and reactive to light. Neck:      Thyroid: No thyromegaly. Vascular: No JVD. Cardiovascular:      Rate and Rhythm: Normal rate and regular rhythm. Heart sounds: Normal heart sounds. No murmur heard. Pulmonary:      Effort: No respiratory distress. Breath sounds: Normal breath sounds. No wheezing, rhonchi or rales. Abdominal:      General: Bowel sounds are normal. There is no distension. Palpations: Abdomen is soft. There is no mass. Tenderness: There is no abdominal tenderness. There is no guarding or rebound. Musculoskeletal:         General: Normal range of motion. Cervical back: Normal range of motion and neck supple. Lymphadenopathy:      Cervical: No cervical adenopathy. Skin:     General: Skin is warm and dry. Findings: No rash. Neurological:      Mental Status: She is alert and oriented to person, place, and time. Psychiatric:         Behavior: Behavior normal.       :       Diagnosis Orders   1. Essential hypertension         :      Requested Prescriptions      No prescriptions requested or ordered in this encounter     Current Outpatient Medications   Medication Sig Dispense Refill    amLODIPine (NORVASC) 2.5 MG tablet Take 1 tablet by mouth daily 90 tablet 1    atenolol (TENORMIN) 50 MG tablet Take 1 tablet by mouth 2 times daily 60 tablet 1    Magnesium Oxide 400 MG CAPS Take 1 capsule by mouth 3 times daily 90 capsule 1    Cholecalciferol (VITAMIN D3) 50 MCG (2000 UT) CAPS Take by mouth      zinc gluconate 50 MG tablet Take 50 mg by mouth daily      nitroGLYCERIN (NITROSTAT) 0.4 MG SL tablet up to max of 3 total doses. If no relief after 1 dose, call 911. 25 tablet 3    potassium chloride (KLOR-CON M) 10 MEQ extended release tablet Take 1 tablet by mouth 2 times daily 180 tablet 1    clindamycin (CLEOCIN) 150 MG capsule Before dental procedures      hydroxychloroquine (PLAQUENIL) 200 MG tablet Take 200 mg by mouth nightly       ketoconazole (NIZORAL) 2 % cream APPLY 1 APPLICATION TOPICALLY TO RIGHT FOOT TWICE DAILY FOR 4 WEEKS THEN A FEW DAYS PER WEEK AS PREVENTION.  tiZANidine (ZANAFLEX) 4 MG tablet Take 8 mg by mouth nightly       aspirin 81 MG tablet Take 1 tablet by mouth daily (Patient taking differently: Take 81 mg by mouth nightly ) 30 tablet 3    NONFORMULARY Take 2 tablets by mouth daily Eye promise      Multiple Vitamin TABS Take by mouth every morning      CALCIUM CITRATE PO Take 600 mg by mouth 2 times daily       omeprazole (PRILOSEC) 20 MG capsule Take 1 capsule by mouth daily   2    Ascorbic Acid (VITAMIN C) 1000 MG tablet Take 1,000 mg by mouth daily        No current facility-administered medications for this visit. No orders of the defined types were placed in this encounter.     B/P recheck next week. If her magnesium is not back to normal will referr her to renal, she prefers Dr Pinky Gabriel. Continue current medications. Return in about 3 months (around 2/26/2022). Discussed use, benefit, and side effects of prescribed medications. All patient questions answered. Pt voiced understanding. Instructed to continue current medications,diet and exercise. Patient agreed with treatment plan.

## 2021-11-26 NOTE — TELEPHONE ENCOUNTER
----- Message from Jacob Philippe MD sent at 11/24/2021  4:57 PM EST -----  Please let her know that her magnesium is getting better   Will repeat another one next week.

## 2021-12-01 ENCOUNTER — TELEPHONE (OUTPATIENT)
Dept: FAMILY MEDICINE CLINIC | Age: 71
End: 2021-12-01

## 2021-12-01 DIAGNOSIS — I10 ESSENTIAL HYPERTENSION: ICD-10-CM

## 2021-12-01 DIAGNOSIS — E83.42 HYPOMAGNESEMIA: Primary | ICD-10-CM

## 2021-12-01 LAB — MAGNESIUM: 1.6 MG/DL (ref 1.8–2.6)

## 2021-12-01 NOTE — TELEPHONE ENCOUNTER
Pt called stating she was giving Dr. Syeda Lara an update after stopping her dieretic. She stated that she is starting to have swelling in her right leg and little bit of pitting. She is wondering if she should resume the medication. Please advise.

## 2021-12-06 ENCOUNTER — TELEPHONE (OUTPATIENT)
Dept: FAMILY MEDICINE CLINIC | Age: 71
End: 2021-12-06

## 2021-12-06 RX ORDER — CALCIUM CARBONATE/VITAMIN D3 500-10/5ML
1 LIQUID (ML) ORAL 3 TIMES DAILY
Qty: 90 CAPSULE | Refills: 1 | Status: SHIPPED | OUTPATIENT
Start: 2021-12-06 | End: 2022-02-18 | Stop reason: SDUPTHER

## 2021-12-06 NOTE — TELEPHONE ENCOUNTER
Pt called,  She ask if you want her to remain on the Magnesium? If so, pt req a refill. Pt does not see Dr Jeovanny Omalley until next month per pt.     [address]

## 2022-01-31 ENCOUNTER — OFFICE VISIT (OUTPATIENT)
Dept: NEPHROLOGY | Age: 72
End: 2022-01-31
Payer: MEDICARE

## 2022-01-31 VITALS
OXYGEN SATURATION: 93 % | WEIGHT: 170.4 LBS | DIASTOLIC BLOOD PRESSURE: 80 MMHG | BODY MASS INDEX: 27.5 KG/M2 | HEART RATE: 66 BPM | SYSTOLIC BLOOD PRESSURE: 148 MMHG | TEMPERATURE: 98.1 F

## 2022-01-31 DIAGNOSIS — E83.42 HYPOMAGNESEMIA: Primary | ICD-10-CM

## 2022-01-31 DIAGNOSIS — I10 PRIMARY HYPERTENSION: ICD-10-CM

## 2022-01-31 PROCEDURE — G8484 FLU IMMUNIZE NO ADMIN: HCPCS | Performed by: INTERNAL MEDICINE

## 2022-01-31 PROCEDURE — 4040F PNEUMOC VAC/ADMIN/RCVD: CPT | Performed by: INTERNAL MEDICINE

## 2022-01-31 PROCEDURE — 1123F ACP DISCUSS/DSCN MKR DOCD: CPT | Performed by: INTERNAL MEDICINE

## 2022-01-31 PROCEDURE — 1090F PRES/ABSN URINE INCON ASSESS: CPT | Performed by: INTERNAL MEDICINE

## 2022-01-31 PROCEDURE — 99204 OFFICE O/P NEW MOD 45 MIN: CPT | Performed by: INTERNAL MEDICINE

## 2022-01-31 PROCEDURE — G8427 DOCREV CUR MEDS BY ELIG CLIN: HCPCS | Performed by: INTERNAL MEDICINE

## 2022-01-31 PROCEDURE — G8399 PT W/DXA RESULTS DOCUMENT: HCPCS | Performed by: INTERNAL MEDICINE

## 2022-01-31 PROCEDURE — 3017F COLORECTAL CA SCREEN DOC REV: CPT | Performed by: INTERNAL MEDICINE

## 2022-01-31 PROCEDURE — G8417 CALC BMI ABV UP PARAM F/U: HCPCS | Performed by: INTERNAL MEDICINE

## 2022-01-31 PROCEDURE — 1036F TOBACCO NON-USER: CPT | Performed by: INTERNAL MEDICINE

## 2022-01-31 RX ORDER — ATENOLOL AND CHLORTHALIDONE TABLET 50; 25 MG/1; MG/1
1 TABLET ORAL DAILY
COMMUNITY
End: 2022-02-28 | Stop reason: SDUPTHER

## 2022-01-31 NOTE — PROGRESS NOTES
Nephrology Consult Note  Patient's Name: Camelia Calvert  8:46 AM  1/31/2022    Nephrologist: Cale Randall    Reason for Consult:  Hypomagnesium  Requesting Physician:  No att. providers found  PCP: Paul Berg MD    Chief Complaint:  I have low magnesium  Assessment   Diagnosis Orders   1. Hypomagnesemia  Magnesium    Potassium   2. Primary hypertension     1.   2.     Plan    1. I discussed my thoughts with the patient. 2. She understood. 3. She had no questions. Her hypomagnesemia is most likely medication related such as circulatory component of atenolol chlorthalidone as well as omeprazole which is very notorious for causing hypomagnesemia. 4. Other differential diagnosis would include Gettleman syndrome syndrome. These are unlikely however. Diarrhea could presented with hypomagnesemia but she denies any. 5. There is no reason for any specific diagnostic work-up at the present time. 6. We will increase her magnesium oxide from 400 mg 3 times a day to 800 mg twice a day which will be one extra tablet daily. .  7. If that does not bring the magnesium level up, we will then increase it to 800 mg 3 times a day. 8. If that does not work, then we will have to change magnesium from the oxide to magnesium gluconate. 9. I discussed that with the patient. 10. Return visit in 4 weeks with labs to include a magnesium level and potassium level respectively. History Obtained From: Patient and electronic medical record. History of Present Ilness:    Camelia Calvert is a 66 y.o. female with history of hypertension gastroesophageal reflux disease, hypomagnesemia referred to our office because of hypomagnesemia. Patient has been on combination of atenolol with chlorthalidone and omeprazole and the chlorthalidone component   was discontinued. Her serum magnesium level did improve but not up to the normal range. She also developed  swellings in her ankles. As a result chlorthalidone was resumed.   She is also on omeprazole for gastroesophageal reflux disease. She tried to stop it about a year ago and developed Rice esophagus and as a result the medicine was resumed. In any case she was sent to us for  hypomagnesemia. She denies any diarrhea. .  No chest pain or shortness of breath. No fever chills. No headaches.       Past Medical History:   Diagnosis Date    Cervical radiculopathy     Degenerative arthritis of cervical spine     Degenerative lumbar disc     Fibromyalgia     GERD (gastroesophageal reflux disease)     Hydronephrosis 02/11/2016    right kidney    Hypertension     Osteoarthritis     neck,hands    Spinal stenosis     UPJ (ureteropelvic junction) obstruction 06/22/2002    Vitreous detachment of left eye 1996    Vitreous detachment of right eye 2005       Past Surgical History:   Procedure Laterality Date    BACK SURGERY  05/19/2017    L2-5 Decompression L2-5 posterior fusion and tlif by Dr Montelongo Covert  08/30/2004    biopsy, Dr. Samina Beltre  02/07/2015    CARDIOVASCULAR STRESS TEST  2014    CATARACT REMOVAL WITH IMPLANT Left 04/25/2016    CATARACT REMOVAL WITH IMPLANT Right 05/09/2016    CERVICAL [address] SURGERY  01/18/2008    C5-6 discectomy, Dr. Hermes Caceres COLONOSCOPY  10/02 10/05 11/10   [facility] Wray, [facility]    HIP ARTHROSCOPY Right 07/17/2014    labrum repair and PSCAS lengthening - Dr. Linda Zendejas in [facility]  08/18/2001    Dr. Eric Ramsey [facility] Right 12/16/2014    TOTAL HIP, DR. Jessica Randhawa - [facility]    JOINT REPLACEMENT Left 10/22/2018    LUMBAR [address] SURGERY  05/2000    [facility]    L3-4 with cage, Dr. Susan Hagen [facility]  08/23/2016    radiofrequency ablation right SI joint - Dr. Gertie Kawasaki Right 08/23/2016    right SI joint    ROTATOR CUFF REPAIR Right 04/23/2012    Dr. Rochelle Gabriel - [facility] Left 2014    Dr. Santo Yanez    [address] Left 2017    LEFT TOTAL HIP ARTHROPLASTY performed by Keegan Mcintosh MD at [address] [place] [address]   2016    [facility]  D/T ureteropelvic junction obstruction, hydronephrosis       Family History   Problem Relation Age of Onset    Heart Disease Mother     Arthritis Father     Kidney Disease Father     Other Father         MDS   81 y/o    Heart Disease Father 61        cabg    Cancer Father         kidney    Stroke Paternal Grandmother     Diabetes Paternal Grandmother     High Blood Pressure Sister     High Cholesterol Sister     Breast Cancer Maternal Cousin     Breast Cancer Maternal Cousin     Breast Cancer Maternal Cousin     Breast Cancer Maternal Cousin     Breast Cancer Maternal Cousin 76        reports that she has never smoked. She has never used smokeless tobacco. She reports that she does not drink alcohol and does not use drugs. Allergies:  Minocycline, Sulfa antibiotics, Bactrim, Erythromycin, Norco [hydrocodone-acetaminophen], Nortriptyline, and Vicodin [hydrocodone-acetaminophen]    Current Medications:    No current facility-administered medications for this visit. Review of Systems:   Review of Systems   Pertinent positives stated above in HPI. All other systems were reviewed and were negative. ROS: As in HPI    Physical exam:   Physical Exam   Constitutional: Well-developed elderly lady in no distress  Vitals:   Vitals:    22 0843   BP: (!) 148/80   Pulse: 66   Temp: 98.1 °F (36.7 °C)   SpO2: 93%       Skin: no rash, turgor is normal  Heent: Pupils are reactive to light. Throat is clear.   Oral mucosa is moist.  Neck: no bruits or jvd noted   Cardiovascular:  S1, S2 without murmur   Respiratory: Clear with no wheezes,rhonchi or rales Abdomen: soft,non tender,good bowel sound and no palpable mass  Ext: No lower extremity edema  Psychiatric: mood and affect appropriate  Musculoskeletal:  Rom, muscular strength intact   CNS: Very awake and very alert. Well-oriented. Normal speech. Good motor strength. No obvious focal deficit. Data:   Labs:  Lab Results   Component Value Date     11/23/2021     09/10/2021     08/22/2021    K 4.3 11/23/2021    K 4.2 09/10/2021    K 3.7 08/23/2021     11/23/2021    CO2 32 11/23/2021    CO2 33 (H) 09/10/2021    CO2 25 08/22/2021    CREATININE 0.79 11/23/2021    CREATININE 0.81 09/10/2021    CREATININE 0.8 08/22/2021    BUN 16 11/23/2021    BUN 18 09/10/2021    BUN 20 08/22/2021    GLUCOSE 94 11/23/2021    GLUCOSE 96 09/10/2021    GLUCOSE 117 (H) 08/22/2021    WBC 8.0 08/23/2021    WBC 10.4 08/22/2021    WBC 10.4 01/08/2020    HGB 13.3 08/23/2021    HGB 13.9 08/22/2021    HGB 12.9 01/08/2020    HCT 41.5 08/23/2021    HCT 41.8 08/22/2021    HCT 38.8 01/08/2020    MCV 96.5 08/23/2021     08/23/2021     {Labs reviewed    Imaging:  CXR results: Thank you Dr. Montserrat Christian MD for allowing us to participate in care of Jorgito Simpson   **This report has been created using voice recognition software. It maycontain minor  errors which are inherent in voice recognition technology. **    Electronically signed by Tay Cyr MD on 1/31/2022 at 8:46 AM

## 2022-02-18 RX ORDER — CALCIUM CARBONATE/VITAMIN D3 500-10/5ML
1 LIQUID (ML) ORAL 3 TIMES DAILY
Qty: 90 CAPSULE | Refills: 3 | Status: SHIPPED | OUTPATIENT
Start: 2022-02-18 | End: 2022-02-28 | Stop reason: DRUGHIGH

## 2022-02-24 LAB
MAGNESIUM: 1.7 MG/DL (ref 1.8–2.6)
POTASSIUM SERPL-SCNC: 4.1 MMOL/L (ref 3.5–5)

## 2022-02-28 ENCOUNTER — OFFICE VISIT (OUTPATIENT)
Dept: NEPHROLOGY | Age: 72
End: 2022-02-28
Payer: MEDICARE

## 2022-02-28 ENCOUNTER — OFFICE VISIT (OUTPATIENT)
Dept: FAMILY MEDICINE CLINIC | Age: 72
End: 2022-02-28

## 2022-02-28 VITALS
DIASTOLIC BLOOD PRESSURE: 80 MMHG | HEART RATE: 80 BPM | WEIGHT: 171.8 LBS | HEIGHT: 66 IN | SYSTOLIC BLOOD PRESSURE: 138 MMHG | BODY MASS INDEX: 27.61 KG/M2 | RESPIRATION RATE: 16 BRPM | TEMPERATURE: 97.7 F

## 2022-02-28 VITALS
BODY MASS INDEX: 27.7 KG/M2 | DIASTOLIC BLOOD PRESSURE: 75 MMHG | TEMPERATURE: 98.2 F | WEIGHT: 171.6 LBS | OXYGEN SATURATION: 96 % | HEART RATE: 66 BPM | SYSTOLIC BLOOD PRESSURE: 170 MMHG

## 2022-02-28 DIAGNOSIS — E83.42 HYPOMAGNESEMIA: Primary | ICD-10-CM

## 2022-02-28 DIAGNOSIS — I10 ESSENTIAL HYPERTENSION: Primary | ICD-10-CM

## 2022-02-28 DIAGNOSIS — M15.9 OSTEOARTHRITIS OF MULTIPLE JOINTS, UNSPECIFIED OSTEOARTHRITIS TYPE: ICD-10-CM

## 2022-02-28 DIAGNOSIS — K21.9 GASTROESOPHAGEAL REFLUX DISEASE, UNSPECIFIED WHETHER ESOPHAGITIS PRESENT: ICD-10-CM

## 2022-02-28 DIAGNOSIS — I10 PRIMARY HYPERTENSION: ICD-10-CM

## 2022-02-28 PROCEDURE — 1036F TOBACCO NON-USER: CPT | Performed by: INTERNAL MEDICINE

## 2022-02-28 PROCEDURE — G8484 FLU IMMUNIZE NO ADMIN: HCPCS | Performed by: INTERNAL MEDICINE

## 2022-02-28 PROCEDURE — G8399 PT W/DXA RESULTS DOCUMENT: HCPCS | Performed by: INTERNAL MEDICINE

## 2022-02-28 PROCEDURE — 99213 OFFICE O/P EST LOW 20 MIN: CPT | Performed by: INTERNAL MEDICINE

## 2022-02-28 PROCEDURE — 1123F ACP DISCUSS/DSCN MKR DOCD: CPT | Performed by: INTERNAL MEDICINE

## 2022-02-28 PROCEDURE — G8417 CALC BMI ABV UP PARAM F/U: HCPCS | Performed by: INTERNAL MEDICINE

## 2022-02-28 PROCEDURE — 1123F ACP DISCUSS/DSCN MKR DOCD: CPT | Performed by: EMERGENCY MEDICINE

## 2022-02-28 PROCEDURE — 1090F PRES/ABSN URINE INCON ASSESS: CPT | Performed by: INTERNAL MEDICINE

## 2022-02-28 PROCEDURE — 1090F PRES/ABSN URINE INCON ASSESS: CPT | Performed by: EMERGENCY MEDICINE

## 2022-02-28 PROCEDURE — 4040F PNEUMOC VAC/ADMIN/RCVD: CPT | Performed by: INTERNAL MEDICINE

## 2022-02-28 PROCEDURE — 3017F COLORECTAL CA SCREEN DOC REV: CPT | Performed by: EMERGENCY MEDICINE

## 2022-02-28 PROCEDURE — 1036F TOBACCO NON-USER: CPT | Performed by: EMERGENCY MEDICINE

## 2022-02-28 PROCEDURE — G8399 PT W/DXA RESULTS DOCUMENT: HCPCS | Performed by: EMERGENCY MEDICINE

## 2022-02-28 PROCEDURE — 3017F COLORECTAL CA SCREEN DOC REV: CPT | Performed by: INTERNAL MEDICINE

## 2022-02-28 PROCEDURE — 99213 OFFICE O/P EST LOW 20 MIN: CPT | Performed by: EMERGENCY MEDICINE

## 2022-02-28 PROCEDURE — G8427 DOCREV CUR MEDS BY ELIG CLIN: HCPCS | Performed by: EMERGENCY MEDICINE

## 2022-02-28 PROCEDURE — G8417 CALC BMI ABV UP PARAM F/U: HCPCS | Performed by: EMERGENCY MEDICINE

## 2022-02-28 PROCEDURE — G8427 DOCREV CUR MEDS BY ELIG CLIN: HCPCS | Performed by: INTERNAL MEDICINE

## 2022-02-28 RX ORDER — ATENOLOL AND CHLORTHALIDONE TABLET 50; 25 MG/1; MG/1
1 TABLET ORAL DAILY
Qty: 90 TABLET | Refills: 1 | Status: SHIPPED | OUTPATIENT
Start: 2022-02-28 | End: 2022-02-28 | Stop reason: SDUPTHER

## 2022-02-28 RX ORDER — CALCIUM CARBONATE/VITAMIN D3 500-10/5ML
2 LIQUID (ML) ORAL 2 TIMES DAILY
Qty: 120 CAPSULE | Refills: 0 | Status: SHIPPED
Start: 2022-02-28 | End: 2022-02-28

## 2022-02-28 RX ORDER — CALCIUM CARBONATE/VITAMIN D3 500-10/5ML
2 LIQUID (ML) ORAL 3 TIMES DAILY
Qty: 180 CAPSULE | Refills: 0 | Status: SHIPPED | OUTPATIENT
Start: 2022-02-28 | End: 2022-04-06 | Stop reason: SDUPTHER

## 2022-02-28 RX ORDER — AMLODIPINE BESYLATE 2.5 MG/1
2.5 TABLET ORAL DAILY
Qty: 90 TABLET | Refills: 1 | Status: SHIPPED | OUTPATIENT
Start: 2022-02-28 | End: 2022-07-20

## 2022-02-28 RX ORDER — ATENOLOL AND CHLORTHALIDONE TABLET 50; 25 MG/1; MG/1
1 TABLET ORAL 2 TIMES DAILY
Qty: 180 TABLET | Refills: 1 | Status: SHIPPED | OUTPATIENT
Start: 2022-02-28 | End: 2022-06-01 | Stop reason: SDUPTHER

## 2022-02-28 RX ORDER — POTASSIUM CHLORIDE 750 MG/1
10 TABLET, EXTENDED RELEASE ORAL 2 TIMES DAILY
Qty: 180 TABLET | Refills: 1 | Status: SHIPPED | OUTPATIENT
Start: 2022-02-28 | End: 2022-07-20

## 2022-02-28 ASSESSMENT — ENCOUNTER SYMPTOMS
CHEST TIGHTNESS: 0
ABDOMINAL PAIN: 0
DIARRHEA: 0
VOMITING: 0
TROUBLE SWALLOWING: 0
CONSTIPATION: 0
NAUSEA: 0
SORE THROAT: 0
WHEEZING: 0
VOICE CHANGE: 0
RHINORRHEA: 0
COUGH: 0
SINUS PRESSURE: 0
BACK PAIN: 0
SHORTNESS OF BREATH: 0

## 2022-02-28 NOTE — PROGRESS NOTES
Visit Date: 2/28/2022    Subjective:    Eusebia Washington is a 66 y. o.female who presents today for:  Chief Complaint   Patient presents with    Hypertension         HPI:       Hypertension  This is a chronic problem. The current episode started more than 1 year ago. The problem is unchanged. The problem is controlled. Pertinent negatives include no anxiety, chest pain, headaches, neck pain, palpitations, peripheral edema, PND or shortness of breath. Risk factors for coronary artery disease include post-menopausal state. Past treatments include calcium channel blockers, beta blockers and diuretics. The current treatment provides significant improvement. There are no compliance problems. CurrentHome Medications:  Current Outpatient Medications   Medication Sig Dispense Refill    amLODIPine (NORVASC) 2.5 MG tablet Take 1 tablet by mouth daily 90 tablet 1    Magnesium Oxide 400 MG CAPS Take 2 capsules by mouth 2 times daily 120 capsule 0    potassium chloride (KLOR-CON M) 10 MEQ extended release tablet Take 1 tablet by mouth 2 times daily 180 tablet 1    atenolol-chlorthalidone (TENORETIC) 50-25 MG per tablet Take 1 tablet by mouth 2 times daily 180 tablet 1    Cholecalciferol (VITAMIN D3) 50 MCG (2000 UT) CAPS Take by mouth      zinc gluconate 50 MG tablet Take 50 mg by mouth daily      nitroGLYCERIN (NITROSTAT) 0.4 MG SL tablet up to max of 3 total doses. If no relief after 1 dose, call 911. 25 tablet 3    clindamycin (CLEOCIN) 150 MG capsule Before dental procedures      hydroxychloroquine (PLAQUENIL) 200 MG tablet Take 200 mg by mouth nightly       ketoconazole (NIZORAL) 2 % cream APPLY 1 APPLICATION TOPICALLY TO RIGHT FOOT TWICE DAILY FOR 4 WEEKS THEN A FEW DAYS PER WEEK AS PREVENTION.       tiZANidine (ZANAFLEX) 4 MG tablet Take 8 mg by mouth nightly       aspirin 81 MG tablet Take 1 tablet by mouth daily (Patient taking differently: Take 81 mg by mouth nightly ) 30 tablet 3    NONFORMULARY Take 2 tablets by mouth daily Eye promise      Multiple Vitamin TABS Take by mouth every morning      CALCIUM CITRATE PO Take 600 mg by mouth daily       omeprazole (PRILOSEC) 20 MG capsule Take 1 capsule by mouth daily   2    Ascorbic Acid (VITAMIN C) 1000 MG tablet Take 1,000 mg by mouth daily        No current facility-administered medications for this visit. Subjective:      Review of Systems   Constitutional: Negative for appetite change, chills, diaphoresis, fatigue and fever. HENT: Negative for congestion, ear pain, postnasal drip, rhinorrhea, sinus pressure, sneezing, sore throat, trouble swallowing and voice change. Respiratory: Negative for cough, chest tightness, shortness of breath and wheezing. Cardiovascular: Negative for chest pain, palpitations, leg swelling and PND. Gastrointestinal: Negative for abdominal pain, constipation, diarrhea, nausea and vomiting. Musculoskeletal: Positive for arthralgias. Negative for back pain, joint swelling, myalgias, neck pain and neck stiffness. Neurological: Negative for dizziness, syncope, weakness, light-headedness, numbness and headaches. Objective:     /80 (Site: Left Upper Arm, Position: Sitting, Cuff Size: Medium Adult)   Pulse 80   Temp 97.7 °F (36.5 °C)   Resp 16   Ht 5' 6\" (1.676 m)   Wt 171 lb 12.8 oz (77.9 kg)   BMI 27.73 kg/m²   BP Readings from Last 3 Encounters:   02/28/22 138/80   01/31/22 (!) 148/80   11/26/21 136/72     Wt Readings from Last 3 Encounters:   02/28/22 171 lb 12.8 oz (77.9 kg)   01/31/22 170 lb 6.4 oz (77.3 kg)   11/26/21 174 lb 8 oz (79.2 kg)       Physical Exam  Vitals reviewed. Constitutional:       Appearance: She is well-developed. HENT:      Head: Normocephalic and atraumatic. Right Ear: External ear normal.      Left Ear: External ear normal.      Nose: Nose normal.   Eyes:      General: No scleral icterus.      Conjunctiva/sclera: Conjunctivae normal.      Pupils: Pupils are equal, round, and reactive to light. Neck:      Thyroid: No thyromegaly. Vascular: No JVD. Cardiovascular:      Rate and Rhythm: Normal rate and regular rhythm. Heart sounds: No murmur heard. No friction rub. Pulmonary:      Effort: Pulmonary effort is normal.      Breath sounds: Normal breath sounds. No wheezing or rales. Chest:      Chest wall: No tenderness. Abdominal:      General: Bowel sounds are normal.      Palpations: Abdomen is soft. There is no mass. Tenderness: There is no abdominal tenderness. Musculoskeletal:      Cervical back: Normal range of motion and neck supple. Lymphadenopathy:      Cervical: No cervical adenopathy. Skin:     Findings: No rash. Neurological:      Mental Status: She is alert and oriented to person, place, and time. Psychiatric:         Behavior: Behavior is cooperative. Assessment:         Diagnosis Orders   1. Essential hypertension  amLODIPine (NORVASC) 2.5 MG tablet   2. Gastroesophageal reflux disease, unspecified whether esophagitis present     3.  Osteoarthritis of multiple joints, unspecified osteoarthritis type         Plan:      Medications Prescribed:  Orders Placed This Encounter   Medications    amLODIPine (NORVASC) 2.5 MG tablet     Sig: Take 1 tablet by mouth daily     Dispense:  90 tablet     Refill:  1     Requesting 1 year supply    DISCONTD: atenolol-chlorthalidone (TENORETIC) 50-25 MG per tablet     Sig: Take 1 tablet by mouth daily     Dispense:  90 tablet     Refill:  1    Magnesium Oxide 400 MG CAPS     Sig: Take 2 capsules by mouth 2 times daily     Dispense:  120 capsule     Refill:  0    potassium chloride (KLOR-CON M) 10 MEQ extended release tablet     Sig: Take 1 tablet by mouth 2 times daily     Dispense:  180 tablet     Refill:  1    atenolol-chlorthalidone (TENORETIC) 50-25 MG per tablet     Sig: Take 1 tablet by mouth 2 times daily     Dispense:  180 tablet     Refill:  1     Orders Placed:  No orders of the defined types were placed in this encounter. Results of Laboratory tests taken 2/23/22 were reviewed with the patient. Results were w/in  acceptable range     Return in about 3 months (around 6/1/2022) for HTN. Discussed use, benefit, and side effects of prescribedmedications. All patient questions answered. Pt voiced understanding. Instructedto continue current medications, diet and exercise. Patient agreed with treatmentplan.

## 2022-02-28 NOTE — PROGRESS NOTES
Renal Progress Note    Assessment and Plan:      Diagnosis Orders   1. Hypomagnesemia     2. Primary hypertension               PLAN:  1. Lab results discussed with the patient. 2. We went through the report together in epic. 3. She understood. 4. She had no questions. 5. Serum magnesium is slightly improved to 1.7 mg/dL from 1.6 mg/dL. 6. As mentioned in the last appointment, hypomagnesia is due to combination of chlorthalidone component of the atenolol with chlorthalidone as well as omeprazole. 7. We are not able to discontinue either of the  medications. 8. Therefore the only option left would be to increase the magnesium oxide from 800 mg twice a day to 3 times a day. 9. That should bring her magnesium level back to normal.  10. However, if that does not happen then we will try magnesium gluconate. 11. This was discussed with the patient. 12. No need for 24-hour urine for magnesium level. 13. Return visit in 4 weeks with labs. Patient Active Problem List   Diagnosis    Hypertension    Degenerative arthritis of cervical spine    Osteoarthritis    Chest pain syndrome    Heart palpitations    Primary osteoarthritis of left hip    Gastroesophageal reflux disease    Acute chest pain           Subjective:   Chief complaint:  Chief Complaint   Patient presents with    Other     Hypomagnesemia      HPI:This is a follow up visit for Ms. Deepika Feldman who is here today for return appointment. I saw her about 4 weeks ago for hypomagnesemia. Her magnesium level was increased from 400 mg 3 times a day to 800 mg twice a day. Doing well since then with no specific complaint. No chest pain. No shortness of breath. No diarrhea. No nausea vomiting. No fever chills. ROS:  Pertinent positives stated above in HPI. All other systems were reviewed and were negative.   Medications:     Current Outpatient Medications   Medication Sig Dispense Refill    amLODIPine (NORVASC) 2.5 MG tablet Take 1 tablet by mouth daily 90 tablet 1    Magnesium Oxide 400 MG CAPS Take 2 capsules by mouth 2 times daily 120 capsule 0    potassium chloride (KLOR-CON M) 10 MEQ extended release tablet Take 1 tablet by mouth 2 times daily 180 tablet 1    atenolol-chlorthalidone (TENORETIC) 50-25 MG per tablet Take 1 tablet by mouth 2 times daily 180 tablet 1    Cholecalciferol (VITAMIN D3) 50 MCG (2000 UT) CAPS Take by mouth      zinc gluconate 50 MG tablet Take 50 mg by mouth daily      nitroGLYCERIN (NITROSTAT) 0.4 MG SL tablet up to max of 3 total doses. If no relief after 1 dose, call 911. 25 tablet 3    clindamycin (CLEOCIN) 150 MG capsule Before dental procedures      hydroxychloroquine (PLAQUENIL) 200 MG tablet Take 200 mg by mouth nightly       ketoconazole (NIZORAL) 2 % cream APPLY 1 APPLICATION TOPICALLY TO RIGHT FOOT TWICE DAILY FOR 4 WEEKS THEN A FEW DAYS PER WEEK AS PREVENTION.  tiZANidine (ZANAFLEX) 4 MG tablet Take 8 mg by mouth nightly       aspirin 81 MG tablet Take 1 tablet by mouth daily (Patient taking differently: Take 81 mg by mouth nightly ) 30 tablet 3    NONFORMULARY Take 2 tablets by mouth daily Eye promise      Multiple Vitamin TABS Take by mouth every morning      CALCIUM CITRATE PO Take 600 mg by mouth daily       omeprazole (PRILOSEC) 20 MG capsule Take 1 capsule by mouth daily   2    Ascorbic Acid (VITAMIN C) 1000 MG tablet Take 1,000 mg by mouth daily        No current facility-administered medications for this visit.        Lab Results:    CBC:   Lab Results   Component Value Date    WBC 8.0 08/23/2021    HGB 13.3 08/23/2021    HCT 41.5 08/23/2021    MCV 96.5 08/23/2021     08/23/2021     BMP:    Lab Results   Component Value Date     11/23/2021     09/10/2021     08/22/2021    K 4.1 02/23/2022    K 4.3 11/23/2021    K 4.2 09/10/2021     11/23/2021    CL 98 09/10/2021    CL 98 08/22/2021    CO2 32 11/23/2021    CO2 33 (H) 09/10/2021    CO2 25 08/22/2021    BUN 16 11/23/2021    BUN 18 09/10/2021    BUN 20 08/22/2021    CREATININE 0.79 11/23/2021    CREATININE 0.81 09/10/2021    CREATININE 0.8 08/22/2021    GLUCOSE 94 11/23/2021    GLUCOSE 96 09/10/2021    GLUCOSE 117 (H) 08/22/2021      Hepatic:   Lab Results   Component Value Date    AST 25 08/23/2021    AST 27 08/22/2021    AST 21 01/08/2020    ALT 25 08/23/2021    ALT 26 08/22/2021    ALT 18 01/08/2020    BILITOT 0.5 08/23/2021    BILITOT 0.6 08/22/2021    BILITOT 0.5 01/08/2020    ALKPHOS 54 08/23/2021    ALKPHOS 55 08/22/2021    ALKPHOS 64 01/08/2020     BNP: No results found for: BNP  Lipids:   Lab Results   Component Value Date    CHOL 160 08/23/2021    HDL 56 08/23/2021     INR:   Lab Results   Component Value Date    INR 1.01 11/20/2017    INR 1.03 04/25/2017    INR 1.09 02/07/2015     URINE:   Lab Results   Component Value Date    PROTUR Negative 08/08/2019     Lab Results   Component Value Date    NITRU Negative 08/08/2019    COLORU Yellow 08/08/2019    COLORU YELLOW 11/20/2017    PHUR 6.0 11/20/2017    WBCUA 0-2 02/15/2016    RBCUA moderate 07/07/2017    RBCUA 0-2 02/15/2016    YEAST NONE SEEN 02/15/2016    BACTERIA 1+ 07/07/2017    BACTERIA many 07/07/2017    CLARITYU Cloudy 07/07/2017    SPECGRAV 1.015 07/07/2017    LEUKOCYTESUR NEGATIVE 11/20/2017    UROBILINOGEN 0.20 08/08/2019    BILIRUBINUR Negative 08/08/2019    BLOODU Negative 08/08/2019    BLOODU NEGATIVE 11/20/2017    GLUCOSEU Negative 08/08/2019    KETUA Negative 08/08/2019      Microalbumen/Creatinine ratio:  No components found for: RUCREAT    Objective:   Vitals: BP (!) 170/75 (Site: Left Upper Arm, Position: Sitting, Cuff Size: Large Adult)   Pulse 66   Temp 98.2 °F (36.8 °C)   Wt 171 lb 9.6 oz (77.8 kg)   SpO2 96%   BMI 27.70 kg/m²      Constitutional:  Alert, awake, no apparent distress  Skin:normal with no rash or any significant lesions  HEENT:Pupils are reactive . Throat is clear.   Oral mucosa is moist.  Neck:supple with no thyromegaly, JVD, lymphadenopathy or bruit   Cardiovascular: Regular sinus rhythm without murmur, rubs or gallops   Respiratory:  Clear to auscultation with no wheezes or rales  Abdomen: Good bowel sound, soft, non tender and no bruit  Ext: No LE edema  Musculoskeletal:Intact  Neuro:Alert, awake and oriented with no obvious focal deficit. Speech is normal.    Electronically signed by Edwin Parker MD on 2/28/2022 at 9:38 AM   **This report has been created using voice recognition software. It maycontain minor  errors which are inherent in voice recognition technology. **

## 2022-03-23 LAB
MAGNESIUM: 1.5 MG/DL (ref 1.8–2.6)
POTASSIUM SERPL-SCNC: 4 MMOL/L (ref 3.5–5)

## 2022-03-28 ENCOUNTER — OFFICE VISIT (OUTPATIENT)
Dept: NEPHROLOGY | Age: 72
End: 2022-03-28
Payer: MEDICARE

## 2022-03-28 VITALS
TEMPERATURE: 97 F | DIASTOLIC BLOOD PRESSURE: 76 MMHG | BODY MASS INDEX: 27.5 KG/M2 | SYSTOLIC BLOOD PRESSURE: 149 MMHG | OXYGEN SATURATION: 95 % | WEIGHT: 170.4 LBS | HEART RATE: 68 BPM

## 2022-03-28 DIAGNOSIS — E83.42 HYPOMAGNESEMIA: Primary | ICD-10-CM

## 2022-03-28 DIAGNOSIS — I10 PRIMARY HYPERTENSION: ICD-10-CM

## 2022-03-28 PROCEDURE — 99213 OFFICE O/P EST LOW 20 MIN: CPT | Performed by: INTERNAL MEDICINE

## 2022-03-28 PROCEDURE — 1090F PRES/ABSN URINE INCON ASSESS: CPT | Performed by: INTERNAL MEDICINE

## 2022-03-28 PROCEDURE — G8427 DOCREV CUR MEDS BY ELIG CLIN: HCPCS | Performed by: INTERNAL MEDICINE

## 2022-03-28 PROCEDURE — G8399 PT W/DXA RESULTS DOCUMENT: HCPCS | Performed by: INTERNAL MEDICINE

## 2022-03-28 PROCEDURE — 1123F ACP DISCUSS/DSCN MKR DOCD: CPT | Performed by: INTERNAL MEDICINE

## 2022-03-28 PROCEDURE — 4040F PNEUMOC VAC/ADMIN/RCVD: CPT | Performed by: INTERNAL MEDICINE

## 2022-03-28 PROCEDURE — 3017F COLORECTAL CA SCREEN DOC REV: CPT | Performed by: INTERNAL MEDICINE

## 2022-03-28 PROCEDURE — G8484 FLU IMMUNIZE NO ADMIN: HCPCS | Performed by: INTERNAL MEDICINE

## 2022-03-28 PROCEDURE — 1036F TOBACCO NON-USER: CPT | Performed by: INTERNAL MEDICINE

## 2022-03-28 PROCEDURE — G8417 CALC BMI ABV UP PARAM F/U: HCPCS | Performed by: INTERNAL MEDICINE

## 2022-03-28 NOTE — PROGRESS NOTES
Renal Progress Note    Assessment and Plan:      Diagnosis Orders   1. Hypomagnesemia  Magnesium   2. Primary hypertension               PLAN:  1. Lab results are reviewed with the patient today. 2. She understood  3. I addressed her questions  4. Serum magnesium level is decreased to 1.5 mg/dL from 1.7 mg/dL. 5. This is due to the patient changing  her magnesium oxide intake from 3 times a day to twice a day on her own due to loose bowel movement  6. I discussed that with her  7. I gave her 3 options which would include doing nothing for now since she has no symptoms of hypomagnesemia, weekly intravenous magnesium infusion or change to a different magnesium formulation such as magnesium gluconate  8. At the end we decided  not to do anything different but continue magnesium oxide twice a day since t she has no symptoms of hypomagnesemia  9. Will however monitor magnesium level once a month  10. Return visit 3 months with labs          Patient Active Problem List   Diagnosis    Hypertension    Degenerative arthritis of cervical spine    Osteoarthritis    Chest pain syndrome    Heart palpitations    Primary osteoarthritis of left hip    Gastroesophageal reflux disease    Acute chest pain           Subjective:   Chief complaint:  Chief Complaint   Patient presents with    1 Month Follow-Up     Hypomagnesemia      HPI:This is a follow up visit for Ms. Jared Joy who is here today for return appointment. I see her for hypomagnesemia. She was last seen about 4 weeks ago. Doing well since then. She did develop loose bowel movement with  3 times a day magnesium oxide. As a result  she decreased it to twice a day on her own. After that the loose bowel movement improved. No chest pain or shortness of breath. No nausea or vomiting. No fever or chills. No navjot diarrhea. ROS:  Pertinent positives stated above in HPI. All other systems were reviewed and were negative.   Medications:     Current Outpatient Medications   Medication Sig Dispense Refill    amLODIPine (NORVASC) 2.5 MG tablet Take 1 tablet by mouth daily 90 tablet 1    potassium chloride (KLOR-CON M) 10 MEQ extended release tablet Take 1 tablet by mouth 2 times daily 180 tablet 1    atenolol-chlorthalidone (TENORETIC) 50-25 MG per tablet Take 1 tablet by mouth 2 times daily 180 tablet 1    Magnesium Oxide 400 MG CAPS Take 2 capsules by mouth in the morning, at noon, and at bedtime (Patient taking differently: Take 2 capsules by mouth in the morning and at bedtime ) 180 capsule 0    Cholecalciferol (VITAMIN D3) 50 MCG (2000 UT) CAPS Take by mouth      zinc gluconate 50 MG tablet Take 50 mg by mouth daily      nitroGLYCERIN (NITROSTAT) 0.4 MG SL tablet up to max of 3 total doses. If no relief after 1 dose, call 911. 25 tablet 3    clindamycin (CLEOCIN) 150 MG capsule Before dental procedures      hydroxychloroquine (PLAQUENIL) 200 MG tablet Take 200 mg by mouth nightly       ketoconazole (NIZORAL) 2 % cream APPLY 1 APPLICATION TOPICALLY TO RIGHT FOOT TWICE DAILY FOR 4 WEEKS THEN A FEW DAYS PER WEEK AS PREVENTION.  tiZANidine (ZANAFLEX) 4 MG tablet Take 8 mg by mouth nightly       aspirin 81 MG tablet Take 1 tablet by mouth daily (Patient taking differently: Take 81 mg by mouth nightly ) 30 tablet 3    NONFORMULARY Take 2 tablets by mouth daily Eye promise      Multiple Vitamin TABS Take by mouth every morning      CALCIUM CITRATE PO Take 600 mg by mouth daily       omeprazole (PRILOSEC) 20 MG capsule Take 1 capsule by mouth daily   2    Ascorbic Acid (VITAMIN C) 1000 MG tablet Take 1,000 mg by mouth daily        No current facility-administered medications for this visit.        Lab Results:    CBC:   Lab Results   Component Value Date    WBC 8.0 08/23/2021    HGB 13.3 08/23/2021    HCT 41.5 08/23/2021    MCV 96.5 08/23/2021     08/23/2021     BMP:    Lab Results   Component Value Date     11/23/2021     09/10/2021     08/22/2021    K 4.0 03/23/2022    K 4.1 02/23/2022    K 4.3 11/23/2021     11/23/2021    CL 98 09/10/2021    CL 98 08/22/2021    CO2 32 11/23/2021    CO2 33 (H) 09/10/2021    CO2 25 08/22/2021    BUN 16 11/23/2021    BUN 18 09/10/2021    BUN 20 08/22/2021    CREATININE 0.79 11/23/2021    CREATININE 0.81 09/10/2021    CREATININE 0.8 08/22/2021    GLUCOSE 94 11/23/2021    GLUCOSE 96 09/10/2021    GLUCOSE 117 (H) 08/22/2021      Hepatic:   Lab Results   Component Value Date    AST 25 08/23/2021    AST 27 08/22/2021    AST 21 01/08/2020    ALT 25 08/23/2021    ALT 26 08/22/2021    ALT 18 01/08/2020    BILITOT 0.5 08/23/2021    BILITOT 0.6 08/22/2021    BILITOT 0.5 01/08/2020    ALKPHOS 54 08/23/2021    ALKPHOS 55 08/22/2021    ALKPHOS 64 01/08/2020     BNP: No results found for: BNP  Lipids:   Lab Results   Component Value Date    CHOL 160 08/23/2021    HDL 56 08/23/2021     INR:   Lab Results   Component Value Date    INR 1.01 11/20/2017    INR 1.03 04/25/2017    INR 1.09 02/07/2015     URINE:   Lab Results   Component Value Date    PROTUR Negative 08/08/2019     Lab Results   Component Value Date    NITRU Negative 08/08/2019    COLORU Yellow 08/08/2019    COLORU YELLOW 11/20/2017    PHUR 6.0 11/20/2017    WBCUA 0-2 02/15/2016    RBCUA moderate 07/07/2017    RBCUA 0-2 02/15/2016    YEAST NONE SEEN 02/15/2016    BACTERIA 1+ 07/07/2017    BACTERIA many 07/07/2017    CLARITYU Cloudy 07/07/2017    SPECGRAV 1.015 07/07/2017    LEUKOCYTESUR NEGATIVE 11/20/2017    UROBILINOGEN 0.20 08/08/2019    BILIRUBINUR Negative 08/08/2019    BLOODU Negative 08/08/2019    BLOODU NEGATIVE 11/20/2017    GLUCOSEU Negative 08/08/2019    KETUA Negative 08/08/2019      Microalbumen/Creatinine ratio:  No components found for: RUCREAT    Objective:   Vitals: BP (!) 149/76 (Site: Left Upper Arm, Position: Sitting, Cuff Size: Large Adult)   Pulse 68   Temp 97 °F (36.1 °C)   Wt 170 lb 6.4 oz (77.3 kg)   SpO2 95%   BMI 27.50 kg/m²      Constitutional:  Alert, awake, no apparent distress  Skin:normal with no rash or any significant lesions  HEENT:Pupils are reactive . Throat is clear. Oral mucosa is moist.  Neck:supple with no thyromegaly, JVD, lymphadenopathy or bruit   Cardiovascular: Regular sinus rhythm without murmur, rubs or gallops   Respiratory:  Clear to auscultation with no wheezes or rales  Abdomen: Good bowel sound, soft, non tender and no bruit  Ext: No LE edema  Musculoskeletal:Intact  Neuro:Alert, awake and oriented with no obvious focal deficit. Speech is normal.    Electronically signed by Chago Stein MD on 3/28/2022 at 8:19 AM   **This report has been created using voice recognition software. It maycontain minor  errors which are inherent in voice recognition technology. **

## 2022-04-06 RX ORDER — CALCIUM CARBONATE/VITAMIN D3 500-10/5ML
2 LIQUID (ML) ORAL 2 TIMES DAILY
Qty: 120 CAPSULE | Refills: 3 | Status: SHIPPED | OUTPATIENT
Start: 2022-04-06 | End: 2022-05-04 | Stop reason: SDUPTHER

## 2022-04-20 ENCOUNTER — HOSPITAL ENCOUNTER (OUTPATIENT)
Dept: WOMENS IMAGING | Age: 72
Discharge: HOME | End: 2022-04-20
Payer: MEDICARE

## 2022-04-20 DIAGNOSIS — Z12.31 VISIT FOR SCREENING MAMMOGRAM: ICD-10-CM

## 2022-04-20 LAB — MAGNESIUM: 1.7 MG/DL (ref 1.8–2.6)

## 2022-04-20 PROCEDURE — 77063 BREAST TOMOSYNTHESIS BI: CPT

## 2022-04-21 ENCOUNTER — TELEPHONE (OUTPATIENT)
Dept: NEPHROLOGY | Age: 72
End: 2022-04-21

## 2022-04-22 ENCOUNTER — TELEPHONE (OUTPATIENT)
Dept: NEPHROLOGY | Age: 72
End: 2022-04-22

## 2022-04-28 VITALS
OXYGEN SATURATION: 95 % | WEIGHT: 170 LBS | TEMPERATURE: 97.7 F | DIASTOLIC BLOOD PRESSURE: 76 MMHG | BODY MASS INDEX: 27.44 KG/M2 | SYSTOLIC BLOOD PRESSURE: 149 MMHG | HEART RATE: 68 BPM

## 2022-04-28 RX ORDER — MAGNESIUM OXIDE 400 MG/1
TABLET ORAL
COMMUNITY
End: 2022-05-04 | Stop reason: SDUPTHER

## 2022-04-28 RX ORDER — POTASSIUM CHLORIDE 750 MG/1
CAPSULE, EXTENDED RELEASE ORAL
COMMUNITY
End: 2022-05-04 | Stop reason: SDUPTHER

## 2022-04-28 RX ORDER — NITROGLYCERIN 0.4 MG/1
TABLET SUBLINGUAL
COMMUNITY
End: 2022-05-04 | Stop reason: SDUPTHER

## 2022-04-28 RX ORDER — MAGNESIUM OXIDE 400 MG/1
TABLET ORAL
COMMUNITY
Start: 2022-04-06 | End: 2022-05-17 | Stop reason: SDUPTHER

## 2022-05-02 LAB
BASOPHILS ABSOLUTE: NORMAL
BASOPHILS RELATIVE PERCENT: NORMAL
BUN BLDV-MCNC: 25 MG/DL
CALCIUM SERPL-MCNC: 10.3 MG/DL
CHLORIDE BLD-SCNC: 101 MMOL/L
CO2: 101 MMOL/L
CREAT SERPL-MCNC: 0.92 MG/DL
EOSINOPHILS ABSOLUTE: NORMAL
EOSINOPHILS RELATIVE PERCENT: NORMAL
GFR CALCULATED: NORMAL
GLUCOSE BLD-MCNC: 99 MG/DL
HCT VFR BLD CALC: 39.2 % (ref 36–46)
HEMOGLOBIN: 13.4 G/DL (ref 12–16)
LYMPHOCYTES ABSOLUTE: NORMAL
LYMPHOCYTES RELATIVE PERCENT: NORMAL
MCH RBC QN AUTO: 31.5 PG
MCHC RBC AUTO-ENTMCNC: 34.1 G/DL
MCV RBC AUTO: 92 FL
MONOCYTES ABSOLUTE: NORMAL
MONOCYTES RELATIVE PERCENT: NORMAL
NEUTROPHILS ABSOLUTE: NORMAL
NEUTROPHILS RELATIVE PERCENT: NORMAL
PLATELET # BLD: 235 K/ΜL
PMV BLD AUTO: 7.7 FL
POTASSIUM SERPL-SCNC: 4.2 MMOL/L
RBC # BLD: 4.26 10^6/ΜL
SODIUM BLD-SCNC: 141 MMOL/L
WBC # BLD: 8.9 10^3/ML

## 2022-05-04 ENCOUNTER — OFFICE VISIT (OUTPATIENT)
Dept: CARDIOLOGY CLINIC | Age: 72
End: 2022-05-04
Payer: MEDICARE

## 2022-05-04 VITALS
SYSTOLIC BLOOD PRESSURE: 130 MMHG | HEART RATE: 68 BPM | WEIGHT: 167 LBS | DIASTOLIC BLOOD PRESSURE: 72 MMHG | BODY MASS INDEX: 26.95 KG/M2

## 2022-05-04 DIAGNOSIS — Z03.89 CORONARY ARTERY DISEASE (CAD) EXCLUDED: Primary | ICD-10-CM

## 2022-05-04 PROBLEM — I25.10 CAD (CORONARY ARTERY DISEASE): Status: ACTIVE | Noted: 2022-05-04

## 2022-05-04 PROCEDURE — 1036F TOBACCO NON-USER: CPT | Performed by: INTERNAL MEDICINE

## 2022-05-04 PROCEDURE — 99213 OFFICE O/P EST LOW 20 MIN: CPT | Performed by: INTERNAL MEDICINE

## 2022-05-04 PROCEDURE — 1090F PRES/ABSN URINE INCON ASSESS: CPT | Performed by: INTERNAL MEDICINE

## 2022-05-04 PROCEDURE — G8417 CALC BMI ABV UP PARAM F/U: HCPCS | Performed by: INTERNAL MEDICINE

## 2022-05-04 PROCEDURE — 3017F COLORECTAL CA SCREEN DOC REV: CPT | Performed by: INTERNAL MEDICINE

## 2022-05-04 PROCEDURE — 4040F PNEUMOC VAC/ADMIN/RCVD: CPT | Performed by: INTERNAL MEDICINE

## 2022-05-04 PROCEDURE — G8399 PT W/DXA RESULTS DOCUMENT: HCPCS | Performed by: INTERNAL MEDICINE

## 2022-05-04 PROCEDURE — 93000 ELECTROCARDIOGRAM COMPLETE: CPT | Performed by: INTERNAL MEDICINE

## 2022-05-04 PROCEDURE — 1123F ACP DISCUSS/DSCN MKR DOCD: CPT | Performed by: INTERNAL MEDICINE

## 2022-05-04 PROCEDURE — G8427 DOCREV CUR MEDS BY ELIG CLIN: HCPCS | Performed by: INTERNAL MEDICINE

## 2022-05-04 RX ORDER — ASPIRIN 81 MG/1
81 TABLET ORAL DAILY
COMMUNITY

## 2022-05-04 ASSESSMENT — ENCOUNTER SYMPTOMS
ANAL BLEEDING: 0
CHOKING: 0
ABDOMINAL PAIN: 0
COLOR CHANGE: 0
ABDOMINAL DISTENTION: 0
BLOOD IN STOOL: 0
TROUBLE SWALLOWING: 0
NAUSEA: 0
SHORTNESS OF BREATH: 0
APNEA: 0
COUGH: 0
WHEEZING: 0
VOICE CHANGE: 0
VOMITING: 0
CHEST TIGHTNESS: 0
STRIDOR: 0

## 2022-05-04 NOTE — PROGRESS NOTES
[address] [facility] [place] [address]  Dept: [phone]  Loc: [phone]     5/4/2022       Michael Nair is here today for   Chief Complaint   Patient presents with    Follow-up     6 MTH W/ LABS           Referring Physician:  No ref. provider found     Patient Active Problem List   Diagnosis    Hypertension    Degenerative arthritis of cervical spine    Osteoarthritis    Chest pain syndrome    Heart palpitations    Primary osteoarthritis of left hip    Gastroesophageal reflux disease    Acute chest pain       Review of Systems   Constitutional: Negative for activity change, appetite change, fatigue, fever and unexpected weight change. HENT: Negative for congestion, trouble swallowing and voice change. Eyes: Negative for visual disturbance. Respiratory: Negative for apnea, cough, choking, chest tightness, shortness of breath, wheezing and stridor. Cardiovascular: Negative for chest pain, palpitations and leg swelling. Gastrointestinal: Negative for abdominal distention, abdominal pain, anal bleeding, blood in stool, nausea and vomiting. Endocrine: Negative for cold intolerance and heat intolerance. Genitourinary: Negative for hematuria. Musculoskeletal: Negative for arthralgias, gait problem, joint swelling and myalgias. Skin: Negative for color change and rash. Allergic/Immunologic: Negative for environmental allergies and food allergies. Neurological: Negative for dizziness, tremors, syncope, facial asymmetry, weakness, light-headedness, numbness and headaches. Hematological: Does not bruise/bleed easily. Psychiatric/Behavioral: Negative for agitation, behavioral problems and sleep disturbance.         Past Medical History:   Diagnosis Date    Cervical radiculopathy     Degenerative arthritis of cervical spine     Degenerative lumbar disc     Fibromyalgia     GERD (gastroesophageal reflux disease)     Hydronephrosis 02/11/2016    right kidney    Hypertension     Osteoarthritis     neck,hands    Spinal stenosis     UPJ (ureteropelvic junction) obstruction 06/22/2002    Vitreous detachment of left eye 1996    Vitreous detachment of right eye 2005       Allergies   Allergen Reactions    Minocycline Swelling    Sulfa Antibiotics Swelling     Other reaction(s): swelling    Bactrim Diarrhea    Erythromycin Diarrhea    Hydrocodone-Acetaminophen Other (See Comments)     Headache  Other reaction(s): bad headaches    Nortriptyline Itching    Vicodin [Hydrocodone-Acetaminophen] Other (See Comments)     Headache.  severe       Current Outpatient Medications   Medication Sig Dispense Refill    aspirin EC 81 MG EC tablet Take 81 mg by mouth daily      magnesium oxide (MAG-OX) 400 MG tablet TAKE 2 TABLETS BY MOUTH IN THE MORNING AND 2 TABLETS AT BEDTIME      amLODIPine (NORVASC) 2.5 MG tablet Take 1 tablet by mouth daily 90 tablet 1    potassium chloride (KLOR-CON M) 10 MEQ extended release tablet Take 1 tablet by mouth 2 times daily 180 tablet 1    atenolol-chlorthalidone (TENORETIC) 50-25 MG per tablet Take 1 tablet by mouth 2 times daily 180 tablet 1    Cholecalciferol (VITAMIN D3) 50 MCG (2000 UT) CAPS Take by mouth      zinc gluconate 50 MG tablet Take 50 mg by mouth daily      nitroGLYCERIN (NITROSTAT) 0.4 MG SL tablet up to max of 3 total doses.  If no relief after 1 dose, call 911. 25 tablet 3    clindamycin (CLEOCIN) 150 MG capsule Before dental procedures      hydroxychloroquine (PLAQUENIL) 200 MG tablet Take 200 mg by mouth nightly       tiZANidine (ZANAFLEX) 4 MG tablet Take 8 mg by mouth nightly       NONFORMULARY Take 2 tablets by mouth daily Eye promise      Multiple Vitamin TABS Take by mouth every morning      CALCIUM CITRATE PO Take 600 mg by mouth daily       omeprazole (PRILOSEC) 20 MG capsule Take 1 capsule by mouth daily   2    Ascorbic Acid (VITAMIN C) 1000 MG tablet Take 1,000 mg by mouth daily        No current facility-administered medications for this visit. Social History     Socioeconomic History    Marital status:      Spouse name: None    Number of children: None    Years of education: None    Highest education level: None   Occupational History    None   Tobacco Use    Smoking status: Never Smoker    Smokeless tobacco: Never Used   Vaping Use    Vaping Use: Never used   Substance and Sexual Activity    Alcohol use: No    Drug use: No    Sexual activity: Yes     Partners: Male   Other Topics Concern    None   Social History Narrative    None     Social Determinants of Health     Financial Resource Strain: Low Risk     Difficulty of Paying Living Expenses: Not hard at all   Food Insecurity: No Food Insecurity    Worried About Running Out of Food in the Last Year: Never true    [place] of Food in the Last Year: Never true   Transportation Needs:     Lack of Transportation (Medical): Not on file    Lack of Transportation (Non-Medical):  Not on file   Physical Activity:     Days of Exercise per Week: Not on file    Minutes of Exercise per Session: Not on file   Stress:     Feeling of Stress : Not on file   Social Connections:     Frequency of Communication with Friends and Family: Not on file    Frequency of Social Gatherings with Friends and Family: Not on file    Attends Sikh Services: Not on file    Active Member of [address] [place] [address] or Organizations: Not on file    Attends Club or Organization Meetings: Not on file    Marital Status: Not on file   Intimate Partner Violence:     Fear of Current or Ex-Partner: Not on file    Emotionally Abused: Not on file    Physically Abused: Not on file    Sexually Abused: Not on file   Housing Stability:     Unable to Pay for Housing in the Last Year: Not on file    Number of [place] in the Last Year: Not on file    Unstable Housing in the Last Year: Not on file       Family History   Problem Relation Age of Onset    Heart Disease Mother     Arthritis Father     Kidney Disease Father     Other Father         MDS   81 y/o    Heart Disease Father 61        cabg    Cancer Father         kidney    Stroke Paternal Grandmother     Diabetes Paternal Grandmother     High Blood Pressure Sister     High Cholesterol Sister     Breast Cancer Maternal Cousin     Breast Cancer Maternal Cousin     Breast Cancer Maternal Cousin     Breast Cancer Maternal Cousin     Breast Cancer Maternal Cousin 68       Blood pressure 130/72, pulse 68, weight 167 lb (75.8 kg), not currently breastfeeding. Physical Exam:    General Appearance: alert and oriented to person, place and time, in no acute distress  Cardiovascular: normal rate, regular rhythm, normal S1 and S2, no murmurs, rubs, clicks, or gallops, distal pulses intact, no carotid bruits, no JVD  Pulmonary/Chest: clear to auscultation bilaterally- no wheezes, rales or rhonchi, normal air movement, no respiratory distress  Abdomen: soft, non-tender, non-distended, normal bowel sounds, no masses   Extremities: no cyanosis, clubbing or edema, pulse   Skin: warm and dry, no rash or erythema  Head: normocephalic and atraumatic  Eyes: pupils equal, round, and reactive to light  Neck: supple and non-tender without mass, no thyromegaly   Musculoskeletal: normal range of motion, no joint swelling, deformity or tenderness  Neurological: alert, oriented, normal speech, no focal findings or movement disorder noted    Lab Data:    Cardiac Enzymes:  No results for input(s): CKTOTAL, CKMB, CKMBINDEX, TROPONINI in the last 72 hours.     CBC:   Lab Results   Component Value Date    WBC 8.0 2021    RBC 4.30 2021    RBC 4.18 2020    HGB 13.3 2021    HCT 41.5 2021     2021       CMP:    Lab Results   Component Value Date     2021    K 4.0 2022    K 3.1 2021     11/23/2021    CO2 32 11/23/2021    BUN 16 11/23/2021    CREATININE 0.79 11/23/2021    LABGLOM 71 08/22/2021    GLUCOSE 94 11/23/2021    CALCIUM 10.1 11/23/2021       Hepatic Function Panel:    Lab Results   Component Value Date    ALKPHOS 54 08/23/2021    ALT 25 08/23/2021    AST 25 08/23/2021    PROT 6.8 08/23/2021    BILITOT 0.5 08/23/2021    BILITOT NEGATIVE 07/21/2017    BILIDIR <0.2 08/23/2021    LABALBU 4.2 08/23/2021       Magnesium:    Lab Results   Component Value Date    MG 1.7 04/20/2022       PT/INR:    Lab Results   Component Value Date    INR 1.01 11/20/2017       HgBA1c:    Lab Results   Component Value Date    LABA1C 5.6 07/22/2019       FLP:    Lab Results   Component Value Date    TRIG 84 08/23/2021    HDL 56 08/23/2021    LDLCALC 87 08/23/2021    LABVLDL 23 01/08/2020       TSH:    Lab Results   Component Value Date    TSH 1.56 11/20/2019        Diagnosis Orders   1. Coronary artery disease (CAD) excluded  93366 - [place] ELECTROCARDIOGRAM, COMPLETE    CBC    Basic Metabolic Panel    Lipid Panel    Hepatic Function Panel        Assessment/Plan    Rajendra Giron is a 66years old lady who is was known to have a history of coronary artery disease moderate in nature she is here for a yearly visit she indicates she is feeling well she denies chest pain she has been physically active. She does have prior history of hyper tension it has been under control the patient has a history of hypercholesterolemia she has been on the statin she had history of arthritis and she has been on the Plaquenil her EKG showed sinus rhythm with poor R wave progression V1 to V3 but there has been no significant changes. Has a history of gastroesophageal reflux has been under control with the Prilosec.   The patient heart cath back in 2015 showed a preserved systolic function of the left ventricle moderate disease of the circumflex and the LAD she continued to be symptoms of free she will continue with the current medication she advised about diet exercise and she will be seen annually follow-up with family physician seek medical attention for any change in clinical condition end of dictation thank    Orders Placed This Encounter   Procedures    CBC     Standing Status:   Future     Standing Expiration Date:   5/4/2023    Basic Metabolic Panel     Standing Status:   Future     Standing Expiration Date:   5/4/2023    Lipid Panel     Standing Status:   Future     Standing Expiration Date:   5/4/2023     Order Specific Question:   Is Patient Fasting?/# of Hours     Answer:   12 hours    Hepatic Function Panel     Standing Status:   Future     Standing Expiration Date:   5/4/2023    [id number] - [place] ELECTROCARDIOGRAM, COMPLETE       Return in about 1 year (around 5/4/2023) for cad,htn.      Deedee Stoner MD

## 2022-05-17 RX ORDER — MAGNESIUM OXIDE 400 MG/1
TABLET ORAL
Qty: 120 TABLET | Refills: 3 | Status: SHIPPED | OUTPATIENT
Start: 2022-05-17 | End: 2022-09-16 | Stop reason: SDUPTHER

## 2022-05-26 LAB — MAGNESIUM: 1.5 MG/DL (ref 1.8–2.6)

## 2022-06-01 ENCOUNTER — OFFICE VISIT (OUTPATIENT)
Dept: FAMILY MEDICINE CLINIC | Age: 72
End: 2022-06-01

## 2022-06-01 VITALS
HEART RATE: 72 BPM | WEIGHT: 172 LBS | SYSTOLIC BLOOD PRESSURE: 134 MMHG | RESPIRATION RATE: 12 BRPM | DIASTOLIC BLOOD PRESSURE: 84 MMHG | HEIGHT: 66 IN | BODY MASS INDEX: 27.64 KG/M2

## 2022-06-01 DIAGNOSIS — Z00.00 MEDICARE ANNUAL WELLNESS VISIT, SUBSEQUENT: Primary | ICD-10-CM

## 2022-06-01 DIAGNOSIS — M15.9 OSTEOARTHRITIS OF MULTIPLE JOINTS, UNSPECIFIED OSTEOARTHRITIS TYPE: ICD-10-CM

## 2022-06-01 DIAGNOSIS — I10 ESSENTIAL HYPERTENSION: ICD-10-CM

## 2022-06-01 DIAGNOSIS — E83.42 HYPOMAGNESEMIA: ICD-10-CM

## 2022-06-01 DIAGNOSIS — K21.9 GASTROESOPHAGEAL REFLUX DISEASE, UNSPECIFIED WHETHER ESOPHAGITIS PRESENT: ICD-10-CM

## 2022-06-01 PROBLEM — R07.9 ACUTE CHEST PAIN: Status: RESOLVED | Noted: 2021-08-22 | Resolved: 2022-06-01

## 2022-06-01 PROCEDURE — 1036F TOBACCO NON-USER: CPT | Performed by: FAMILY MEDICINE

## 2022-06-01 PROCEDURE — G8399 PT W/DXA RESULTS DOCUMENT: HCPCS | Performed by: FAMILY MEDICINE

## 2022-06-01 PROCEDURE — G8417 CALC BMI ABV UP PARAM F/U: HCPCS | Performed by: FAMILY MEDICINE

## 2022-06-01 PROCEDURE — 1090F PRES/ABSN URINE INCON ASSESS: CPT | Performed by: FAMILY MEDICINE

## 2022-06-01 PROCEDURE — G0439 PPPS, SUBSEQ VISIT: HCPCS | Performed by: FAMILY MEDICINE

## 2022-06-01 PROCEDURE — 99213 OFFICE O/P EST LOW 20 MIN: CPT | Performed by: FAMILY MEDICINE

## 2022-06-01 PROCEDURE — 1123F ACP DISCUSS/DSCN MKR DOCD: CPT | Performed by: FAMILY MEDICINE

## 2022-06-01 PROCEDURE — G8427 DOCREV CUR MEDS BY ELIG CLIN: HCPCS | Performed by: FAMILY MEDICINE

## 2022-06-01 PROCEDURE — 3017F COLORECTAL CA SCREEN DOC REV: CPT | Performed by: FAMILY MEDICINE

## 2022-06-01 RX ORDER — ATENOLOL AND CHLORTHALIDONE TABLET 50; 25 MG/1; MG/1
1 TABLET ORAL 2 TIMES DAILY
Qty: 180 TABLET | Refills: 1 | Status: SHIPPED | OUTPATIENT
Start: 2022-06-01 | End: 2022-10-10 | Stop reason: SDUPTHER

## 2022-06-01 ASSESSMENT — ENCOUNTER SYMPTOMS
ORTHOPNEA: 0
VOMITING: 0
ABDOMINAL PAIN: 0
BACK PAIN: 1
CHEST TIGHTNESS: 0
BLURRED VISION: 0
NAUSEA: 0
SHORTNESS OF BREATH: 0
CONSTIPATION: 0
DIARRHEA: 0
EYES NEGATIVE: 1
COUGH: 0

## 2022-06-01 ASSESSMENT — PATIENT HEALTH QUESTIONNAIRE - PHQ9
SUM OF ALL RESPONSES TO PHQ QUESTIONS 1-9: 0
1. LITTLE INTEREST OR PLEASURE IN DOING THINGS: 0
SUM OF ALL RESPONSES TO PHQ QUESTIONS 1-9: 0
SUM OF ALL RESPONSES TO PHQ9 QUESTIONS 1 & 2: 0
2. FEELING DOWN, DEPRESSED OR HOPELESS: 0

## 2022-06-01 ASSESSMENT — LIFESTYLE VARIABLES
HOW OFTEN DO YOU HAVE A DRINK CONTAINING ALCOHOL: NEVER
HOW MANY STANDARD DRINKS CONTAINING ALCOHOL DO YOU HAVE ON A TYPICAL DAY: 1 OR 2

## 2022-06-01 NOTE — PROGRESS NOTES
Medicare Annual Wellness Visit    Sheila Montiel is here for Medicare AWV and Hypertension    Assessment & Plan   Essential hypertension  -     atenolol-chlorthalidone (TENORETIC) 50-25 MG per tablet; Take 1 tablet by mouth 2 times daily, Disp-180 tablet, R-1Normal  Gastroesophageal reflux disease, unspecified whether esophagitis present  Osteoarthritis of multiple joints, unspecified osteoarthritis type  Hypomagnesemia      Recommendations for Preventive Services Due: see orders and patient instructions/AVS.  Recommended screening schedule for the next 5-10 years is provided to the patient in written form: see Patient Instructions/AVS.     Return in about 4 months (around 10/1/2022) for htn. Subjective   The following acute and/or chronic problems were also addressed today:  HTN, ASCVD    Patient's complete Health Risk Assessment and screening values have been reviewed and are found in Flowsheets. The following problems were reviewed today and where indicated follow up appointments were made and/or referrals ordered. Positive Risk Factor Screenings with Interventions:               General Health and ACP:  General  In general, how would you say your health is?: Good  In the past 7 days, have you experienced any of the following: New or Increased Pain, New or Increased Fatigue, Loneliness, Social Isolation, Stress or Anger?: (!) Yes  Select all that apply: (!) New or Increased Pain  Do you get the social and emotional support that you need?: Yes  Do you have a Living Will?: Yes    Advance Directives     Power of  Living Will ACP-Advance Directive ACP-Power of     Not on File Filed on 05/24/17 Filed Not on File      General Health Risk Interventions:  · Pain issues: she goes to the pain clinic and recently did PT. She states that she was going downstairs and her tennis shoes slipped and she went down on her left side.                Objective   Vitals:    06/01/22 0808   BP: 134/84   Site: Right Upper Arm   Position: Sitting   Cuff Size: Medium Adult   Pulse: 72   Resp: 12   Weight: 172 lb (78 kg)   Height: 5' 6\" (1.676 m)      Body mass index is 27.76 kg/m². Allergies   Allergen Reactions    Minocycline Swelling    Sulfa Antibiotics Swelling     Other reaction(s): swelling    Bactrim Diarrhea    Erythromycin Diarrhea    Hydrocodone-Acetaminophen Other (See Comments)     Headache  Other reaction(s): bad headaches    Nortriptyline Itching    Vicodin [Hydrocodone-Acetaminophen] Other (See Comments)     Headache.  severe     Prior to Visit Medications    Medication Sig Taking? Authorizing Provider   atenolol-chlorthalidone (TENORETIC) 50-25 MG per tablet Take 1 tablet by mouth 2 times daily Yes Rafiq Prado MD   magnesium oxide (MAG-OX) 400 MG tablet TAKE 2 TABLETS BY MOUTH IN THE MORNING AND 2 TABLETS AT BEDTIME Yes Isamar Leggett MD   aspirin EC 81 MG EC tablet Take 81 mg by mouth daily Yes Historical Provider, MD   amLODIPine (NORVASC) 2.5 MG tablet Take 1 tablet by mouth daily Yes Farhat Little MD   potassium chloride (KLOR-CON M) 10 MEQ extended release tablet Take 1 tablet by mouth 2 times daily Yes Farhat Little MD   Cholecalciferol (VITAMIN D3) 50 MCG (2000 UT) CAPS Take by mouth Yes Historical Provider, MD   zinc gluconate 50 MG tablet Take 50 mg by mouth daily Yes Historical Provider, MD   nitroGLYCERIN (NITROSTAT) 0.4 MG SL tablet up to max of 3 total doses. If no relief after 1 dose, call 911.  Yes Kinga Samson MD   clindamycin (CLEOCIN) 150 MG capsule Before dental procedures Yes Historical Provider, MD   hydroxychloroquine (PLAQUENIL) 200 MG tablet Take 200 mg by mouth nightly  Yes Historical Provider, MD   tiZANidine (ZANAFLEX) 4 MG tablet Take 8 mg by mouth nightly  Yes Historical Provider, MD   NONFORMULARY Take 2 tablets by mouth daily Eye promise Yes Historical Provider, MD   Multiple Vitamin TABS Take by mouth every morning Yes Historical Provider, MD CALCIUM CITRATE PO Take 600 mg by mouth daily  Yes Historical Provider, MD   omeprazole (PRILOSEC) 20 MG capsule Take 1 capsule by mouth daily  Yes Historical Provider, MD   Ascorbic Acid (VITAMIN C) 1000 MG tablet Take 1,000 mg by mouth daily  Yes Historical Provider, MD Mejia (Including outside providers/suppliers regularly involved in providing care):   Patient Care Team:  Dany Juarez MD as PCP - General (Family Medicine)  Dany Juarez MD as PCP - [facility] Empaneled Provider     Reviewed and updated this visit:  Tobacco  Allergies  Meds  Problems  Med Hx  Surg Hx  Soc Hx  Fam Hx                       Date: 6/1/2022    Yannick Aguilar is a 66 y.o. female who presents today for:  Chief Complaint   Patient presents with    Medicare AWV    Hypertension stable  She follows with Dr Brain Montgomery (Rheumatology), Ansel Gottron (pain management), Ivonne Lujan (GI), Merle(ortho), Velia Lambert (ophtalmology), Amairani(GYN),Parth (dermatology), Monika Boateng (cardiology) and Taylor Tavera (nephrology). HPI:     Hypertension  This is a chronic problem. The current episode started more than 1 year ago. The problem is unchanged. The problem is controlled. Pertinent negatives include no blurred vision, chest pain, headaches, orthopnea, palpitations, peripheral edema, PND or shortness of breath. Risk factors for coronary artery disease include post-menopausal state. Past treatments include calcium channel blockers, beta blockers and diuretics. The current treatment provides significant improvement. There are no compliance problems. Hypertensive end-organ damage includes CAD/MI. has a current medication list which includes the following prescription(s): atenolol-chlorthalidone, magnesium oxide, aspirin ec, amlodipine, potassium chloride, vitamin d3, zinc gluconate, nitroglycerin, clindamycin, hydroxychloroquine, tizanidine, NONFORMULARY, multiple vitamin, calcium citrate, omeprazole, and vitamin c.     Allergies   Allergen Reactions    Minocycline Swelling    Sulfa Antibiotics Swelling     Other reaction(s): swelling    Bactrim Diarrhea    Erythromycin Diarrhea    Hydrocodone-Acetaminophen Other (See Comments)     Headache  Other reaction(s): bad headaches    Nortriptyline Itching    Vicodin [Hydrocodone-Acetaminophen] Other (See Comments)     Headache.  severe       Social History     Tobacco Use    Smoking status: Never Smoker    Smokeless tobacco: Never Used   Vaping Use    Vaping Use: Never used   Substance Use Topics    Alcohol use: No    Drug use: No       Past Medical History:   Diagnosis Date    CAD (coronary artery disease)     Cervical radiculopathy     Degenerative arthritis of cervical spine     Degenerative lumbar disc     Fibromyalgia     GERD (gastroesophageal reflux disease)     Hydronephrosis 02/11/2016    right kidney    Hyperlipidemia     Hypertension     Hypokalemia     Hypomagnesemia     Osteoarthritis     neck,hands    Polymyalgia rheumatica (HCC)     Rheumatoid arthritis (HCC)     Spinal stenosis     UPJ (ureteropelvic junction) obstruction 06/22/2002    Vitreous detachment of left eye 1996    Vitreous detachment of right eye 2005       Past Surgical History:   Procedure Laterality Date    BACK SURGERY  05/19/2017    L2-5 Decompression L2-5 posterior fusion and tlif by Dr Annamaria Villela  08/30/2004    biopsy, Dr. Blanca Wilson  02/07/2015    CARDIOVASCULAR STRESS TEST  2014    CATARACT REMOVAL WITH IMPLANT Left 04/25/2016    CATARACT REMOVAL WITH IMPLANT Right 05/09/2016    CERVICAL DISC SURGERY  01/18/2008    C5-6 discectomy, Dr. Kaylin Grace Do COLONOSCOPY  10/02 10/05 11/10   [facility] CATH LAB PROCEDURE  2015   Lorin Peña, [facility]    HIP ARTHROSCOPY Right 07/17/2014    labrum repair and PSCAS lengthening - Dr. Dante Bradley in [facility]  08/18/2001    Dr. Bib Lee Do JOINT REPLACEMENT Right 2014    TOTAL HIP, DR. Sina Baeza - [facility]    JOINT REPLACEMENT Left 10/22/2018    LUMBAR One [address] SURGERY  2000    L3-5 discectomy, Dr. Adithya Antony, [facility] [address]  2001    L3-4 with cage, Dr. Yvonne Talley - [place]  2016    radiofrequency ablation right SI joint - Dr. Mauricio España Right 2016    right SI joint    Iris Willard Right 2012    Dr. Melvin Thomas Left 2014    Dr. Guilherme Holland    [address] Left 2017    LEFT TOTAL HIP ARTHROPLASTY performed by Payam Mederos MD at [address] [place] [address]   2016    [facility]  D/T ureteropelvic junction obstruction, hydronephrosis       Family History   Problem Relation Age of Onset    Heart Disease Mother     Arthritis Father     Kidney Disease Father     Other Father         MDS   79 y/o    Heart Disease Father 61        cabg    Cancer Father         kidney    Stroke Paternal Grandmother     Diabetes Paternal Grandmother     High Blood Pressure Sister     High Cholesterol Sister     Breast Cancer Maternal Cousin     Breast Cancer Maternal Cousin     Breast Cancer Maternal Cousin     Breast Cancer Maternal Cousin     Breast Cancer Maternal Cousin 68     Subjective:     Review of Systems   Constitutional: Negative for activity change, appetite change, diaphoresis, fatigue and fever. HENT: Negative. Eyes: Negative. Negative for blurred vision. Respiratory: Negative for cough, chest tightness and shortness of breath. Cardiovascular: Negative for chest pain, palpitations, orthopnea, leg swelling and PND. Gastrointestinal: Negative for abdominal pain, constipation, diarrhea, nausea and vomiting. Genitourinary: Negative.     Musculoskeletal: Positive for arthralgias and back pain.   Skin: Negative. Negative for rash. Neurological: Negative for dizziness, syncope, weakness, light-headedness, numbness and headaches. Psychiatric/Behavioral: Negative.        :   /84 (Site: Right Upper Arm, Position: Sitting, Cuff Size: Medium Adult)   Pulse 72   Resp 12   Ht 5' 6\" (1.676 m)   Wt 172 lb (78 kg)   BMI 27.76 kg/m²   Wt Readings from Last 3 Encounters:   06/01/22 172 lb (78 kg)   05/04/22 167 lb (75.8 kg)   04/28/22 170 lb (77.1 kg)     Physical Exam  Vitals and nursing note reviewed. Constitutional:       General: She is not in acute distress. Appearance: She is well-developed. She is not diaphoretic. HENT:      Head: Normocephalic and atraumatic. Eyes:      General: No scleral icterus. Right eye: No discharge. Left eye: No discharge. Conjunctiva/sclera: Conjunctivae normal.      Pupils: Pupils are equal, round, and reactive to light. Neck:      Thyroid: No thyromegaly. Vascular: No JVD. Cardiovascular:      Rate and Rhythm: Normal rate and regular rhythm. Heart sounds: Normal heart sounds. No murmur heard. Pulmonary:      Effort: No respiratory distress. Breath sounds: Normal breath sounds. No wheezing, rhonchi or rales. Abdominal:      General: Bowel sounds are normal. There is no distension. Palpations: Abdomen is soft. There is no mass. Tenderness: There is no abdominal tenderness. There is no guarding or rebound. Musculoskeletal:         General: Normal range of motion. Cervical back: Normal range of motion and neck supple. Left hip: Tenderness present. No deformity or lacerations. Normal range of motion. Normal strength. Legs:    Lymphadenopathy:      Cervical: No cervical adenopathy. Skin:     General: Skin is warm and dry. Findings: No rash. Neurological:      Mental Status: She is alert and oriented to person, place, and time.    Psychiatric:         Behavior: Behavior normal. :       Diagnosis Orders   1. Essential hypertension  atenolol-chlorthalidone (TENORETIC) 50-25 MG per tablet   2. Gastroesophageal reflux disease, unspecified whether esophagitis present     3. Osteoarthritis of multiple joints, unspecified osteoarthritis type     4. Hypomagnesemia         :      Requested Prescriptions     Signed Prescriptions Disp Refills    atenolol-chlorthalidone (TENORETIC) 50-25 MG per tablet 180 tablet 1     Sig: Take 1 tablet by mouth 2 times daily     Current Outpatient Medications   Medication Sig Dispense Refill    atenolol-chlorthalidone (TENORETIC) 50-25 MG per tablet Take 1 tablet by mouth 2 times daily 180 tablet 1    magnesium oxide (MAG-OX) 400 MG tablet TAKE 2 TABLETS BY MOUTH IN THE MORNING AND 2 TABLETS AT BEDTIME 120 tablet 3    aspirin EC 81 MG EC tablet Take 81 mg by mouth daily      amLODIPine (NORVASC) 2.5 MG tablet Take 1 tablet by mouth daily 90 tablet 1    potassium chloride (KLOR-CON M) 10 MEQ extended release tablet Take 1 tablet by mouth 2 times daily 180 tablet 1    Cholecalciferol (VITAMIN D3) 50 MCG (2000 UT) CAPS Take by mouth      zinc gluconate 50 MG tablet Take 50 mg by mouth daily      nitroGLYCERIN (NITROSTAT) 0.4 MG SL tablet up to max of 3 total doses. If no relief after 1 dose, call 911. 25 tablet 3    clindamycin (CLEOCIN) 150 MG capsule Before dental procedures      hydroxychloroquine (PLAQUENIL) 200 MG tablet Take 200 mg by mouth nightly       tiZANidine (ZANAFLEX) 4 MG tablet Take 8 mg by mouth nightly       NONFORMULARY Take 2 tablets by mouth daily Eye promise      Multiple Vitamin TABS Take by mouth every morning      CALCIUM CITRATE PO Take 600 mg by mouth daily       omeprazole (PRILOSEC) 20 MG capsule Take 1 capsule by mouth daily   2    Ascorbic Acid (VITAMIN C) 1000 MG tablet Take 1,000 mg by mouth daily        No current facility-administered medications for this visit.      No orders of the defined types were placed in this encounter. Continue current medications. Return in about 4 months (around 10/1/2022) for htn. Discussed use, benefit, and side effects of prescribed medications. All patient questions answered. Pt voiced understanding. Instructed to continue current medications,diet and exercise. Patient agreed with treatment plan.

## 2022-06-01 NOTE — PATIENT INSTRUCTIONS
Personalized Preventive Plan for Melissa Brewerte - 6/1/2022  Medicare offers a range of preventive health benefits. Some of the tests and screenings are paid in full while other may be subject to a deductible, co-insurance, and/or copay. Some of these benefits include a comprehensive review of your medical history including lifestyle, illnesses that may run in your family, and various assessments and screenings as appropriate. After reviewing your medical record and screening and assessments performed today your provider may have ordered immunizations, labs, imaging, and/or referrals for you. A list of these orders (if applicable) as well as your Preventive Care list are included within your After Visit Summary for your review. Other Preventive Recommendations:    · A preventive eye exam performed by an eye specialist is recommended every 1-2 years to screen for glaucoma; cataracts, macular degeneration, and other eye disorders. · A preventive dental visit is recommended every 6 months. · Try to get at least 150 minutes of exercise per week or 10,000 steps per day on a pedometer . · Order or download the FREE \"Exercise & Physical Activity: Your Everyday Guide\" from [employer]. Call 0-640.588.6315 or search [employer] online. · You need 2066-5093 mg of calcium and 7787-6803 IU of vitamin D per day. It is possible to meet your calcium requirement with diet alone, but a vitamin D supplement is usually necessary to meet this goal.  · When exposed to the sun, use a sunscreen that protects against both UVA and UVB radiation with an SPF of 30 or greater. Reapply every 2 to 3 hours or after sweating, drying off with a towel, or swimming. · Always wear a seat belt when traveling in a car. Always wear a helmet when riding a bicycle or motorcycle.     DASH Diet: After Your Visit  Your Care Instructions  The DASH diet is an eating plan that can help lower your blood pressure. DASH stands for Dietary Approaches to Stop Hypertension. Hypertension is high blood pressure. The DASH diet focuses on eating foods that are high in calcium, potassium, and magnesium. These nutrients can lower blood pressure. The foods that are highest in these nutrients are fruits, vegetables, low-fat dairy products, nuts, seeds, and legumes. But taking calcium, potassium, and magnesium supplements instead of eating foods that are high in those nutrients does not have the same effect. The DASH diet also includes whole grains, fish, and poultry. The DASH diet is one of several lifestyle changes your doctor may recommend to lower your high blood pressure. Your doctor may also want you to decrease the amount of sodium in your diet. Lowering sodium while following the DASH diet can lower blood pressure even further than just the DASH diet alone. Follow-up care is a key part of your treatment and safety. Be sure to make and go to all appointments, and call your doctor if you are having problems. Its also a good idea to know your test results and keep a list of the medicines you take. How can you care for yourself at home? Following the DASH diet  · Eat 4 to 5 servings of fruit each day. A serving is 1 medium-sized piece of fruit, ½ cup chopped or canned fruit, 1/4 cup dried fruit, or 4 ounces (½ cup) of fruit juice. Choose fruit more often than fruit juice. · Eat 4 to 5 servings of vegetables each day. A serving is 1 cup of lettuce or raw leafy vegetables, ½ cup of chopped or cooked vegetables, or 4 ounces (½ cup) of vegetable juice. Choose vegetables more often than vegetable juice. · Get 2 to 3 servings of low-fat and fat-free dairy each day. A serving is 8 ounces of milk, 1 cup of yogurt, or 1 ½ ounces of cheese. · Eat 7 to 8 servings of grains each day.  A serving is 1 slice of bread, 1 ounce of dry cereal, or ½ cup of cooked rice, pasta, or cooked cereal. Try to choose whole-grain products as much as possible. · Limit lean meat, poultry, and fish to 6 ounces each day. Six ounces is about the size of two decks of cards. · Eat 4 to 5 servings of nuts, seeds, and legumes (cooked dried beans, lentils, and split peas) each week. A serving is 1/3 cup of nuts, 2 tablespoons of seeds, or ½ cup cooked dried beans or peas. · Limit sweets and added sugars to 5 servings or less a week. A serving is 1 tablespoon jelly or jam, ½ cup sorbet, or 1 cup of lemonade. Tips for success  · Start small. Do not try to make dramatic changes to your diet all at once. You might feel that you are missing out on your favorite foods and then be more likely to not follow the plan. Make small changes, and stick with them. Once those changes become habit, add a few more changes. · Try some of the following:  ¨ Make it a goal to eat a fruit or vegetable at every meal and at snacks. This will make it easy to get the recommended amount of fruits and vegetables each day. ¨ Try yogurt topped with fruit and nuts for a snack or healthy dessert. ¨ Add lettuce, tomato, cucumber, and onion to sandwiches. ¨ Combine a ready-made pizza crust with low-fat mozzarella cheese and lots of vegetable toppings. Try using tomatoes, squash, spinach, broccoli, carrots, cauliflower, and onions. ¨ Have a variety of cut-up vegetables with a low-fat dip as an appetizer instead of chips and dip. ¨ Sprinkle sunflower seeds or chopped almonds over salads. Or try adding chopped walnuts or almonds to cooked vegetables. Try some vegetarian meals using beans and peas. Add garbanzo or kidney beans to salads. Make burritos and tacos with mashed raman beans or black beans    © 4600-9158 [employer]. Care instructions adapted under license by [facility].  This care instruction is for use with your licensed healthcare professional. If you have questions about a medical condition or this instruction, always ask your healthcare professional. [employer] disclaims any warranty or liability for your use of this information. Content Version: 9.4.26770; Last Revised: March 15, 2012              ·     Keep Your Memory Donelda Goodpasture       Many factors can affect your ability to remembera hectic lifestyle, aging, stress, chronic disease, and certain medicines. But, there are steps you can take to sharpen your mind and help preserve your memory. Challenge Your Brain   Regularly challenging your mind may help keeps it in top shape. Good mental exercises include:   · Crossword puzzlesUse a dictionary if you need it; you will learn more that way. · Brainteasers Try some! · Crafts, such as wood working and sewing   · Hobbies, such as gardening and building model airplanes   · [facility] old friends or join groups to meet new ones. · Reading   · Learning a new language   · Taking a class, whether it be art history or sherrie chi   · TravelingExperience the food, history, and culture of your destination   · Learning to use a computer   · Going to museums, the theater, or thought-provoking movies   · Changing things in your daily life, such as reversing your pattern in the grocery store or brushing your teeth using your nondominant hand   Use Memory Aids   There is no need to remember every detail on your own. These memory aids can help:   · Calendars and day planners   · Electronic organizers to store all sorts of helpful informationThese devices can \"beep\" to remind you of appointments. · A book of days to record birthdays, anniversaries, and other occasions that occur on the same date every year   · Detailed \"to-do\" lists and strategically placed sticky notes   · Quick \"study\" sessionsBefore a gathering, review who will be there so their names will be fresh in your mind.    · Establish routinesFor example, keep your keys, wallet, and umbrella in the same place all the time or take medicine with your 8:00 AM glass of juice   Live a Healthy Life Many actions that will keep your body strong will do the same for your mind. For example:   Talk to Your Doctor About Herbs and Supplements    Malnutrition and vitamin deficiencies can impair your mental function. For example, vitamin B12 deficiency can cause a range of symptoms, including confusion. But, what if your nutritional needs are being met? Can herbs and supplements still offer a benefit? Researchers have investigated a range of natural remedies, such as ginkgo , ginseng , and the supplement phosphatidylserine (PS). So far, though, the evidence is inconsistent as to whether these products can improve memory or thinking. If you are interested in taking herbs and supplements, talk to your doctor first because they may interact with other medicines that you are taking. Exercise Regularly    Among the many benefits of regular exercise are increased blood flow to the brain and decreased risk of certain diseases that can interfere with memory function. One study found that even moderate exercise has a beneficial effect. Examples of \"moderate\" exercise include:   · Playing 18 holes of golf once a week, without a cart   · Playing tennis twice a week   · Walking one mile per day   Manage Stress    It can be tough to remember what is important when your mind is cluttered. Make time for relaxation. Choose activities that calm you down, and make it routine. Manage Chronic Conditions    Side effects of high blood pressure , diabetes, and heart disease can interfere with mental function. Many of the lifestyle steps discussed here can help manage these conditions. Strive to eat a healthy diet, exercise regularly, get stress under control, and follow your doctor's advice for your condition. Minimize Medications    Talk to your doctor about the medicines that you take. Some may be unnecessary. Also, healthy lifestyle habits may lower the need for certain drugs.      Last Reviewed: April 2010 Stacey Saunders MD Updated: 4/13/2010   ·     Keeping Home a [address]       As we get older, changes in balance, gait, strength, vision, hearing, and cognition make even the most youthful senior more prone to accidents. Falls are one of the leading health risks for older people. This increased risk of falling is related to:   · Aging process (eg, decreased muscle strength, slowed reflexes)   · Higher incidence of chronic health problems (eg, arthritis, diabetes) that may limit mobility, agility or sensory awareness   · Side effects of medicine (eg, dizziness, blurred vision)especially medicines like prescription pain medicines and drugs used to treat mental health conditions   Depending on the brittleness of your bones, the consequences of a fall can be serious and long lasting. Home Life   Research by the Association of Aging [facility]) shows that some home accidents among older adults can be prevented by making simple lifestyle changes and basic modifications and repairs to the home environment. Here are some lifestyle changes that experts recommend:   · Have your hearing and vision checked regularly. Be sure to wear prescription glasses that are right for you. · Speak to your doctor or pharmacist about the possible side effects of your medicines. A number of medicines can cause dizziness. · If you have problems with sleep, talk to your doctor. · Limit your intake of alcohol. · If necessary, use a cane or walker to help maintain your balance. · Wear supportive, rubber-soled shoes, even at home. If you live in a region that gets wintry weather, you may want to put special cleats on your shoes to prevent you from slipping on the snow and ice. · Exercise regularly to help maintain muscle tone, agility, and balance. · Always hold the banister when going up or down stairs. Also, use  bars when getting in or out of the bath or shower, or using the toilet. · To avoid dizziness, get up slowly from a lying down position. Sit up first, dangling your legs for a minute or two before rising to a standing position. Overall Home Safety Check   According to the Consumer Product Safety Commision's \"Older Consumer Home Safety Checklist,\" it is important to check for potential hazards in each room. And remember, proper lighting is an essential factor in home safety. If you cannot see clearly, you are more likely to fall. Important questions to ask yourself include:   · Are lamp, electric, extension, and telephone cords placed out of the flow of traffic and maintained in good condition? Have frayed cords been replaced? · Are all small rugs and runners slip resistant? If not, you can secure them to the floor with a special double-sided carpet tape. · Are smoke detectors properly locatedone on every floor of your home and one outside of every sleeping area? Are they in good working order? Are batteries replaced at least once a year? · Do you have a well-maintained carbon monoxide detector outside every sleeping are in your home? · Does your furniture layout leave plenty of space to maneuver between and around chairs, tables, beds, and sofas? · Are hallways, stairs and passages between rooms well lit? Can you reach a lamp without getting out of bed? · Are floor surfaces well maintained? Shag rugs, high-pile carpeting, tile floors, and polished wood floors can be particularly slippery. Stairs should always have handrails and be carpeted or fitted with a non-skid tread. · Is your telephone easily reachable. Is the cord safely tucked away? Room by Room   According to the Association of Aging, bathrooms and tiffanie are the two most potentially hazardous rooms in your home. In the Kitchen    · Be sure your stove is in proper working order and always make sure burners and the oven are off before you go out or go to sleep.     · Keep pots on the back burners, turn handles away from the front of the stove, and keep stove clean and free of grease build-up. · Kitchen ventilation systems and range exhausts should be working properly. · Keep flammable objects such as towels and pot holders away from the cooking area except when in use. Make sure kitchen curtains are tied back. · Move cords and appliances away from the sink and hot surfaces. If extension cords are needed, install wiring guides so they do not hang over the sink, range, or working areas. · Look for coffee pots, kettles and toaster ovens with automatic shut-offs. · Keep a mop handy in the kitchen so you can wipe up spills instantly. You should also have a small fire extinguisher. · Arrange your kitchen with frequently used items on lower shelves to avoid the need to stand on a stepstool to reach them. · Make sure countertops are well-lit to avoid injuries while cutting and preparing food. In the Bathroom    · Use a non-slip mat or decals in the tub and shower, since wet, soapy tile or porcelain surfaces are extremely slippery. · Make sure bathroom rugs are non-skid or tape them firmly to the floor. Bathtubs should have at least one, preferably two, grab bars, firmly attached to structural supports in the wall. (Do not use built-in soap holders or glass shower doors as grab bars.)    · Tub seats fitted with non-slip material on the legs allow you to wash sitting down. For people with limited mobility, bathtub transfer benches allow you to slide safely into the tub. · Raised toilet seats and toilet safety rails are helpful for those with knee or hip problems. In the Bedroom    · Make sure you use a nightlight and that the area around your bed is clear of potential obstacles. · Be careful with electric blankets and never go to sleep with a heating pad, which can cause serious burns even if on a low setting. · Use fire-resistant mattress covers and pillows, and NEVER smoke in bed.     · Keep a phone next to the bed that is programmed to dial 911 at the push of a button. If you have a chronic condition, you may want to sign on with an automatic call-in service. Typically the system includes a small pendant that connects directly to an emergency medical voice-response system. You should also make arrangements to stay in contact with someonefriend, neighbor, family memberon a regular schedule. Fire Prevention   According to the [employer]. (Smoke Alarms for Every) [address] [place] [address], senior citizens are one of the two highest risk groups for death and serious injuries due to residential fires. · When cooking, wear short-sleeved items, never a bulky long-sleeved robe. · The [facility]'s Safety Checklist for Older Consumers emphasizes the importance of checking basements, garages, workshops and storage areas for fire hazards, such as volatile liquids, piles of old rags or clothing and overloaded circuits. · Never smoke in bed or when lying down on a couch or recliner chair. · Small portable electric or kerosene heaters are responsible for many home fires and should be used cautiously if at all. If you do use one, be sure to keep them away from flammable materials. · In case of fire, make sure you have a pre-established emergency exit plan. · Have a professional check your fireplace and other fuel-burning appliances yearly. Helping Hands   Baby boomers entering the ramirez years will continue to see the development of new products to help older adults live safely and independently in spite of age-related changes. Making Life More Livable  , by Lexi Snider, lists over 1,000 products for \"living well in the mature years,\" such as bathing and mobility aids, household security devices, ergonomically designed knives and peelers, and faucet valves and knobs for temperature control. Medical supply stores and organizations are good sources of information about products that improve your quality of life and insure your safety.      Last Reviewed: November 2009 Le Carney MD   Updated: 3/7/2011     ·

## 2022-06-23 LAB — MAGNESIUM: 1.7 MG/DL (ref 1.6–2.6)

## 2022-06-27 ENCOUNTER — OFFICE VISIT (OUTPATIENT)
Dept: NEPHROLOGY | Age: 72
End: 2022-06-27
Payer: MEDICARE

## 2022-06-27 VITALS
SYSTOLIC BLOOD PRESSURE: 143 MMHG | WEIGHT: 174 LBS | BODY MASS INDEX: 28.08 KG/M2 | HEART RATE: 65 BPM | DIASTOLIC BLOOD PRESSURE: 75 MMHG | OXYGEN SATURATION: 95 %

## 2022-06-27 DIAGNOSIS — I10 PRIMARY HYPERTENSION: ICD-10-CM

## 2022-06-27 DIAGNOSIS — E83.42 HYPOMAGNESEMIA: Primary | ICD-10-CM

## 2022-06-27 PROCEDURE — 1090F PRES/ABSN URINE INCON ASSESS: CPT | Performed by: INTERNAL MEDICINE

## 2022-06-27 PROCEDURE — G8417 CALC BMI ABV UP PARAM F/U: HCPCS | Performed by: INTERNAL MEDICINE

## 2022-06-27 PROCEDURE — G8399 PT W/DXA RESULTS DOCUMENT: HCPCS | Performed by: INTERNAL MEDICINE

## 2022-06-27 PROCEDURE — 99213 OFFICE O/P EST LOW 20 MIN: CPT | Performed by: INTERNAL MEDICINE

## 2022-06-27 PROCEDURE — 1036F TOBACCO NON-USER: CPT | Performed by: INTERNAL MEDICINE

## 2022-06-27 PROCEDURE — 1123F ACP DISCUSS/DSCN MKR DOCD: CPT | Performed by: INTERNAL MEDICINE

## 2022-06-27 PROCEDURE — G8427 DOCREV CUR MEDS BY ELIG CLIN: HCPCS | Performed by: INTERNAL MEDICINE

## 2022-06-27 PROCEDURE — 3017F COLORECTAL CA SCREEN DOC REV: CPT | Performed by: INTERNAL MEDICINE

## 2022-06-27 NOTE — PROGRESS NOTES
Renal Progress Note    Assessment and Plan:      Diagnosis Orders   1. Hypomagnesemia     2. Primary hypertension               PLAN:  1. Lab results  reviewed with the patient. 2. Serum magnesium-has ranged between 1.5 mg/dL to 1.7 mg /dl  3. Medications reviewed  4. No changes  5. Continue current magnesium dose  6. Continue monthly serum magnesium check  7. Return visit in 6 months with labs          Patient Active Problem List   Diagnosis    Hypertension    Degenerative arthritis of cervical spine    Osteoarthritis    Heart palpitations    Primary osteoarthritis of left hip    Gastroesophageal reflux disease    CAD (coronary artery disease)           Subjective:   Chief complaint:  Chief Complaint   Patient presents with    Other     Hypomagnesemia      HPI:This is a follow up visit for Ms. María Stovall who is here today for return appointment. I see her for hypomagnesemia. She was last seen about 3 months ago. Doing well since then with no complaint. No chest pain. No shortness of breath. Appetite is good. No nausea or vomiting. ROS:  Pertinent positives stated above in HPI. All other systems were reviewed and were negative. Medications:     Current Outpatient Medications   Medication Sig Dispense Refill    atenolol-chlorthalidone (TENORETIC) 50-25 MG per tablet Take 1 tablet by mouth 2 times daily 180 tablet 1    magnesium oxide (MAG-OX) 400 MG tablet TAKE 2 TABLETS BY MOUTH IN THE MORNING AND 2 TABLETS AT BEDTIME 120 tablet 3    aspirin EC 81 MG EC tablet Take 81 mg by mouth daily      amLODIPine (NORVASC) 2.5 MG tablet Take 1 tablet by mouth daily 90 tablet 1    potassium chloride (KLOR-CON M) 10 MEQ extended release tablet Take 1 tablet by mouth 2 times daily 180 tablet 1    Cholecalciferol (VITAMIN D3) 50 MCG (2000 UT) CAPS Take by mouth      zinc gluconate 50 MG tablet Take 50 mg by mouth daily      nitroGLYCERIN (NITROSTAT) 0.4 MG SL tablet up to max of 3 total doses.  If no relief after 1 dose, call 911. 25 tablet 3    clindamycin (CLEOCIN) 150 MG capsule Before dental procedures      hydroxychloroquine (PLAQUENIL) 200 MG tablet Take 200 mg by mouth nightly       tiZANidine (ZANAFLEX) 4 MG tablet Take 8 mg by mouth nightly       NONFORMULARY Take 2 tablets by mouth daily [facility]      Multiple Vitamin TABS Take by mouth every morning      CALCIUM CITRATE PO Take 600 mg by mouth daily       omeprazole (PRILOSEC) 20 MG capsule Take 1 capsule by mouth daily   2    Ascorbic Acid (VITAMIN C) 1000 MG tablet Take 1,000 mg by mouth daily        No current facility-administered medications for this visit.        Lab Results:    CBC:   Lab Results   Component Value Date    WBC 8.9 05/02/2022    HGB 13.4 05/02/2022    HCT 39.2 05/02/2022    MCV 92 05/02/2022     05/02/2022     BMP:    Lab Results   Component Value Date     05/02/2022     11/23/2021     09/10/2021    K 4.2 05/02/2022    K 4.0 03/23/2022    K 4.1 02/23/2022     05/02/2022     11/23/2021    CL 98 09/10/2021    CO2 101 05/02/2022    CO2 32 11/23/2021    CO2 33 (H) 09/10/2021    BUN 25 05/02/2022    BUN 16 11/23/2021    BUN 18 09/10/2021    CREATININE 0.92 05/02/2022    CREATININE 0.79 11/23/2021    CREATININE 0.81 09/10/2021    GLUCOSE 99 05/02/2022    GLUCOSE 94 11/23/2021    GLUCOSE 96 09/10/2021      Hepatic:   Lab Results   Component Value Date    AST 25 08/23/2021    AST 27 08/22/2021    AST 21 01/08/2020    ALT 25 08/23/2021    ALT 26 08/22/2021    ALT 18 01/08/2020    BILITOT 0.5 08/23/2021    BILITOT 0.6 08/22/2021    BILITOT 0.5 01/08/2020    ALKPHOS 54 08/23/2021    ALKPHOS 55 08/22/2021    ALKPHOS 64 01/08/2020     BNP: No results found for: BNP  Lipids:   Lab Results   Component Value Date    CHOL 160 08/23/2021    HDL 56 08/23/2021     INR:   Lab Results   Component Value Date    INR 1.01 11/20/2017    INR 1.03 04/25/2017    INR 1.09 02/07/2015     URINE:   Lab Results Component Value Date    PROTUR Negative 08/08/2019     Lab Results   Component Value Date    NITRU Negative 08/08/2019    COLORU Yellow 08/08/2019    COLORU YELLOW 11/20/2017    PHUR 6.0 11/20/2017    WBCUA 0-2 02/15/2016    RBCUA moderate 07/07/2017    RBCUA 0-2 02/15/2016    YEAST NONE SEEN 02/15/2016    BACTERIA 1+ 07/07/2017    BACTERIA many 07/07/2017    CLARITYU Cloudy 07/07/2017    SPECGRAV 1.015 07/07/2017    LEUKOCYTESUR NEGATIVE 11/20/2017    UROBILINOGEN 0.20 08/08/2019    BILIRUBINUR Negative 08/08/2019    BLOODU Negative 08/08/2019    BLOODU NEGATIVE 11/20/2017    GLUCOSEU Negative 08/08/2019    KETUA Negative 08/08/2019      Microalbumen/Creatinine ratio:  No components found for: RUCREAT    Objective:   Vitals: BP (!) 143/75 (Site: Left Upper Arm, Position: Sitting, Cuff Size: Large Adult)   Pulse 65   Wt 174 lb (78.9 kg)   SpO2 95%   BMI 28.08 kg/m²      Constitutional:  Alert, awake, no apparent distress  Skin:normal with no rash or any significant lesions  HEENT:Pupils are reactive . Throat is clear. Oral mucosa is moist.  Neck:supple with no thyromegaly, JVD, lymphadenopathy or bruit  Cardiovascular: Regular sinus rhythm without murmur, rubs or gallops   Respiratory:  Clear to auscultation with no wheezes or rales  Abdomen: Good bowel sound, soft, non tender and no bruit  Ext: No LE edema  Musculoskeletal:Intact  Neuro:Alert, awake and oriented with no obvious focal deficit. Speech is normal.    Electronically signed by Baylee Farris MD on 6/27/2022 at 8:12 AM   **This report has been created using voice recognition software. It maycontain minor  errors which are inherent in voice recognition technology. **

## 2022-07-19 DIAGNOSIS — I10 ESSENTIAL HYPERTENSION: ICD-10-CM

## 2022-07-20 LAB — MAGNESIUM: 1.6 MG/DL (ref 1.6–2.6)

## 2022-07-20 RX ORDER — POTASSIUM CHLORIDE 750 MG/1
TABLET, EXTENDED RELEASE ORAL
Qty: 180 TABLET | Refills: 0 | Status: SHIPPED | OUTPATIENT
Start: 2022-07-20 | End: 2022-10-10 | Stop reason: SDUPTHER

## 2022-07-20 RX ORDER — AMLODIPINE BESYLATE 2.5 MG/1
2.5 TABLET ORAL DAILY
Qty: 90 TABLET | Refills: 0 | Status: SHIPPED | OUTPATIENT
Start: 2022-07-20 | End: 2022-10-10 | Stop reason: SDUPTHER

## 2022-07-20 NOTE — TELEPHONE ENCOUNTER
The pharmacy is requesting a refill of the below medication which has been pended for you:     Requested Prescriptions     Pending Prescriptions Disp Refills    amLODIPine (NORVASC) 2.5 MG tablet [Pharmacy Med Name: amLODIPine Besylate 2.5 MG Oral Tablet] 90 tablet 0     Sig: TAKE 1 TABLET BY MOUTH  DAILY       Last Appointment Date: 2/28/2022  Next Appointment Date: Visit date not found    Allergies   Allergen Reactions    Minocycline Swelling    Sulfa Antibiotics Swelling     Other reaction(s): swelling    Bactrim Diarrhea    Erythromycin Diarrhea    Hydrocodone-Acetaminophen Other (See Comments)     Headache  Other reaction(s): bad headaches    Nortriptyline Itching    Vicodin [Hydrocodone-Acetaminophen] Other (See Comments)     Headache.  severe

## 2022-08-24 LAB — MAGNESIUM: 1.6 MG/DL

## 2022-08-25 LAB — MAGNESIUM: 1.6 MG/DL (ref 1.6–2.6)

## 2022-08-26 ENCOUNTER — TELEPHONE (OUTPATIENT)
Dept: NEPHROLOGY | Age: 72
End: 2022-08-26

## 2022-09-13 ENCOUNTER — TELEPHONE (OUTPATIENT)
Dept: FAMILY MEDICINE CLINIC | Age: 72
End: 2022-09-13

## 2022-09-13 NOTE — TELEPHONE ENCOUNTER
Melida from Dr Domingo Stapleton office called, patient is having a right total shoulder replacement November 22nd.   Needing a Pre Op    Please call Mina [phone]

## 2022-09-16 ENCOUNTER — TELEPHONE (OUTPATIENT)
Dept: FAMILY MEDICINE CLINIC | Age: 72
End: 2022-09-16

## 2022-09-16 DIAGNOSIS — R35.0 URINARY FREQUENCY: Primary | ICD-10-CM

## 2022-09-16 RX ORDER — MAGNESIUM OXIDE 400 MG/1
TABLET ORAL
Qty: 120 TABLET | Refills: 3 | Status: SHIPPED | OUTPATIENT
Start: 2022-09-16

## 2022-09-21 RX ORDER — NITROFURANTOIN 25; 75 MG/1; MG/1
100 CAPSULE ORAL 2 TIMES DAILY
Qty: 14 CAPSULE | Refills: 0 | Status: SHIPPED | OUTPATIENT
Start: 2022-09-21 | End: 2022-09-28

## 2022-09-22 LAB — MAGNESIUM: 1.7 MG/DL (ref 1.6–2.6)

## 2022-09-23 ENCOUNTER — TELEPHONE (OUTPATIENT)
Dept: FAMILY MEDICINE CLINIC | Age: 72
End: 2022-09-23

## 2022-09-23 DIAGNOSIS — R35.0 URINE FREQUENCY: Primary | ICD-10-CM

## 2022-09-23 NOTE — TELEPHONE ENCOUNTER
Patient called stating that Avend[address] Haynes [address] is causing insomnia and nausea as well as belching. She is also C/O headache which she never gets and these are some of the side effects of the Macrobid. She is stopping the Macrobid.     Please call her [facility] [address]

## 2022-09-24 LAB
APPEARANCE: CLEAR
BACTERIA: ABNORMAL PER HPF
BACTERIA: ABNORMAL PER HPF
BILIRUBIN: NEGATIVE
COLOR: YELLOW
EPITHELIAL CELLS: ABNORMAL PER HPF (ref 0–10)
EPITHELIAL CELLS: ABNORMAL PER HPF (ref 0–10)
GLUCOSE BLD-MCNC: NEGATIVE MG/DL
HYALINE CASTS: ABNORMAL
HYALINE CASTS: ABNORMAL
KETONES, URINE: NEGATIVE
LEUKOCYTE ESTERASE, URINE: ABNORMAL
NITRITE, URINE: NEGATIVE
OCCULT BLOOD,URINE: NEGATIVE
PH: 7.5 (ref 5–9)
PROTEIN, URINE: ABNORMAL
RBC: ABNORMAL PER HPF (ref 0–5)
RBC: ABNORMAL PER HPF (ref 0–5)
SP GRAVITY MISCELLANEOUS: 1.01 (ref 1–1.03)
UROBILINOGEN, URINE: 0.2
WBC: ABNORMAL PER HPF (ref 0–5)
WBC: ABNORMAL PER HPF (ref 0–5)

## 2022-09-25 LAB — URINE CULTURE, ROUTINE: NORMAL

## 2022-09-26 ENCOUNTER — TELEPHONE (OUTPATIENT)
Dept: FAMILY MEDICINE CLINIC | Age: 72
End: 2022-09-26

## 2022-09-26 NOTE — TELEPHONE ENCOUNTER
----- Message from Tati Clifton MD sent at 9/26/2022  1:16 PM EDT -----  Please let her know that her urine culture was negative for infection.

## 2022-10-10 ENCOUNTER — OFFICE VISIT (OUTPATIENT)
Dept: FAMILY MEDICINE CLINIC | Age: 72
End: 2022-10-10

## 2022-10-10 VITALS
BODY MASS INDEX: 28 KG/M2 | HEART RATE: 78 BPM | RESPIRATION RATE: 16 BRPM | DIASTOLIC BLOOD PRESSURE: 92 MMHG | HEIGHT: 66 IN | SYSTOLIC BLOOD PRESSURE: 142 MMHG | WEIGHT: 174.25 LBS

## 2022-10-10 DIAGNOSIS — K21.9 GASTROESOPHAGEAL REFLUX DISEASE, UNSPECIFIED WHETHER ESOPHAGITIS PRESENT: ICD-10-CM

## 2022-10-10 DIAGNOSIS — E87.6 HYPOKALEMIA: ICD-10-CM

## 2022-10-10 DIAGNOSIS — I10 ESSENTIAL HYPERTENSION: ICD-10-CM

## 2022-10-10 DIAGNOSIS — M47.812 SPONDYLOSIS OF CERVICAL REGION WITHOUT MYELOPATHY OR RADICULOPATHY: ICD-10-CM

## 2022-10-10 PROCEDURE — 1036F TOBACCO NON-USER: CPT | Performed by: FAMILY MEDICINE

## 2022-10-10 PROCEDURE — G8427 DOCREV CUR MEDS BY ELIG CLIN: HCPCS | Performed by: FAMILY MEDICINE

## 2022-10-10 PROCEDURE — 1123F ACP DISCUSS/DSCN MKR DOCD: CPT | Performed by: FAMILY MEDICINE

## 2022-10-10 PROCEDURE — 99213 OFFICE O/P EST LOW 20 MIN: CPT | Performed by: FAMILY MEDICINE

## 2022-10-10 PROCEDURE — G8399 PT W/DXA RESULTS DOCUMENT: HCPCS | Performed by: FAMILY MEDICINE

## 2022-10-10 PROCEDURE — 1090F PRES/ABSN URINE INCON ASSESS: CPT | Performed by: FAMILY MEDICINE

## 2022-10-10 PROCEDURE — G8417 CALC BMI ABV UP PARAM F/U: HCPCS | Performed by: FAMILY MEDICINE

## 2022-10-10 PROCEDURE — 3017F COLORECTAL CA SCREEN DOC REV: CPT | Performed by: FAMILY MEDICINE

## 2022-10-10 RX ORDER — LANOLIN ALCOHOL/MO/W.PET/CERES
CREAM (GRAM) TOPICAL
COMMUNITY
Start: 2022-08-15 | End: 2022-10-10

## 2022-10-10 RX ORDER — POTASSIUM CHLORIDE 750 MG/1
TABLET, EXTENDED RELEASE ORAL
Qty: 180 TABLET | Refills: 1 | Status: SHIPPED | OUTPATIENT
Start: 2022-10-10

## 2022-10-10 RX ORDER — AMLODIPINE BESYLATE 2.5 MG/1
2.5 TABLET ORAL DAILY
Qty: 90 TABLET | Refills: 1 | Status: SHIPPED | OUTPATIENT
Start: 2022-10-10

## 2022-10-10 RX ORDER — ATENOLOL AND CHLORTHALIDONE TABLET 50; 25 MG/1; MG/1
1 TABLET ORAL 2 TIMES DAILY
Qty: 180 TABLET | Refills: 1 | Status: SHIPPED | OUTPATIENT
Start: 2022-10-10

## 2022-10-10 SDOH — ECONOMIC STABILITY: FOOD INSECURITY: WITHIN THE PAST 12 MONTHS, YOU WORRIED THAT YOUR FOOD WOULD RUN OUT BEFORE YOU GOT MONEY TO BUY MORE.: NEVER TRUE

## 2022-10-10 SDOH — ECONOMIC STABILITY: FOOD INSECURITY: WITHIN THE PAST 12 MONTHS, THE FOOD YOU BOUGHT JUST DIDN'T LAST AND YOU DIDN'T HAVE MONEY TO GET MORE.: NEVER TRUE

## 2022-10-10 ASSESSMENT — ENCOUNTER SYMPTOMS
COUGH: 0
SHORTNESS OF BREATH: 0
CONSTIPATION: 0
ORTHOPNEA: 0
VOMITING: 0
ABDOMINAL PAIN: 0
DIARRHEA: 0
CHEST TIGHTNESS: 0
NAUSEA: 0
EYES NEGATIVE: 1
BLURRED VISION: 0

## 2022-10-10 ASSESSMENT — SOCIAL DETERMINANTS OF HEALTH (SDOH): HOW HARD IS IT FOR YOU TO PAY FOR THE VERY BASICS LIKE FOOD, HOUSING, MEDICAL CARE, AND HEATING?: NOT HARD AT ALL

## 2022-10-10 NOTE — PROGRESS NOTES
Date: 10/10/2022    Malena Hopkins is a 66 y.o. female who presents today for:  Chief Complaint   Patient presents with    Hypertension       HPI:     Hypertension  This is a chronic problem. The current episode started more than 1 year ago. The problem is unchanged. The problem is controlled. Pertinent negatives include no blurred vision, chest pain, headaches, neck pain, orthopnea, palpitations, peripheral edema, PND or shortness of breath. Risk factors for coronary artery disease include post-menopausal state. Past treatments include calcium channel blockers, beta blockers and diuretics. The current treatment provides significant improvement. There are no compliance problems. has a current medication list which includes the following prescription(s): potassium chloride, atenolol-chlorthalidone, amlodipine, magnesium oxide, aspirin ec, vitamin d3, zinc gluconate, nitroglycerin, clindamycin, hydroxychloroquine, tizanidine, NONFORMULARY, multiple vitamin, calcium citrate, omeprazole, and vitamin c. Allergies   Allergen Reactions    Minocycline Swelling    Sulfa Antibiotics Swelling     Other reaction(s): swelling    Bactrim Diarrhea    Erythromycin Diarrhea    Hydrocodone-Acetaminophen Other (See Comments)     Headache  Other reaction(s): bad headaches    Macrobid [Nitrofurantoin] Other (See Comments)     She got a HA, upset stomach and decreased appetite.      Nortriptyline Itching    Vicodin [Hydrocodone-Acetaminophen] Other (See Comments)     Headache.  severe       Social History     Tobacco Use    Smoking status: Never    Smokeless tobacco: Never   Vaping Use    Vaping Use: Never used   Substance Use Topics    Alcohol use: No    Drug use: No       Past Medical History:   Diagnosis Date    CAD (coronary artery disease)     Cervical radiculopathy     Degenerative arthritis of cervical spine     Degenerative lumbar disc     Fibromyalgia     GERD (gastroesophageal reflux disease)     Hydronephrosis 02/11/2016    right kidney    Hyperlipidemia     Hypertension     Hypokalemia     Hypomagnesemia     Osteoarthritis     neck,hands    Polymyalgia rheumatica (HCC)     Rheumatoid arthritis ([address]     Spinal stenosis     UPJ (ureteropelvic junction) obstruction 06/22/2002    Vitreous detachment of left eye 1996    Vitreous detachment of right eye 2005       Past Surgical History:   Procedure Laterality Date    BACK SURGERY  05/19/2017    L2-5 Decompression L2-5 posterior fusion and tlif by Dr Ramos Delacruz  08/30/2004    biopsy, Dr. Clemmie Ditto - [facility]  02/07/2015    CARDIOVASCULAR STRESS TEST  2014    CATARACT REMOVAL WITH IMPLANT Left 04/25/2016    CATARACT REMOVAL WITH IMPLANT Right 05/09/2016    CERVICAL DISC SURGERY  01/18/2008    C5-6 discectomy, Dr. Dewey Cardona -[address]  10/02 10/05 11/10    DIAGNOSTIC CARDIAC CATH LAB PROCEDURE  2015    [facility]    Dr. Ashok Andrade, [facility]    HIP ARTHROSCOPY Right 07/17/2014    labrum repair and PSCAS lengthening - Dr. Lewis Essex in [facility] ([facility])  08/18/2001    Dr. Hamlet Pineda - [facility] Right 12/16/2014    TOTAL HIP, DR. Simba Badillo - [facility]    JOINT REPLACEMENT Left 10/22/2018    LUMBAR [address] SURGERY  05/2000    L3-5 discectomy, Dr. Sergey Rebollar, [address] [place] [address]  01/2001    L3-4 with cage, Dr. Alina Delgado - [address] [facility] [address]  08/23/2016    radiofrequency ablation right SI joint - Dr. Kimi Hutchison Right 08/23/2016    right SI joint    [facility] [place] Right 04/23/2012    Dr. Sol Proper    Dr. Roxianne Cogan Left 09/22/2014    Dr. Kay Altman    [address] Left 12/11/2017    LEFT TOTAL HIP ARTHROPLASTY performed by Sabino Bob MD at [address] [place] [address]  02/23/2016    [facility]  D/T ureteropelvic junction Right eye: No discharge. Left eye: No discharge. Conjunctiva/sclera: Conjunctivae normal.      Pupils: Pupils are equal, round, and reactive to light. Neck:      Thyroid: No thyromegaly. Vascular: No JVD. Cardiovascular:      Rate and Rhythm: Normal rate and regular rhythm. Heart sounds: Normal heart sounds. No murmur heard. Pulmonary:      Effort: No respiratory distress. Breath sounds: Normal breath sounds. No wheezing, rhonchi or rales. Abdominal:      General: Bowel sounds are normal. There is no distension. Palpations: Abdomen is soft. There is no mass. Tenderness: There is no abdominal tenderness. There is no guarding or rebound. Musculoskeletal:         General: Normal range of motion. Cervical back: Normal range of motion and neck supple. Lymphadenopathy:      Cervical: No cervical adenopathy. Skin:     General: Skin is warm and dry. Findings: No rash. Neurological:      Mental Status: She is alert and oriented to person, place, and time. Psychiatric:         Behavior: Behavior normal.     :       Diagnosis Orders   1. Essential hypertension  atenolol-chlorthalidone (TENORETIC) 50-25 MG per tablet    amLODIPine (NORVASC) 2.5 MG tablet      2. Gastroesophageal reflux disease, unspecified whether esophagitis present        3. Spondylosis of cervical region without myelopathy or radiculopathy        4.  Hypokalemia  potassium chloride (KLOR-CON M) 10 MEQ extended release tablet          :      Requested Prescriptions     Signed Prescriptions Disp Refills    potassium chloride (KLOR-CON M) 10 MEQ extended release tablet 180 tablet 1     Sig: TAKE 1 TABLET BY MOUTH  TWICE DAILY    atenolol-chlorthalidone (TENORETIC) 50-25 MG per tablet 180 tablet 1     Sig: Take 1 tablet by mouth 2 times daily    amLODIPine (NORVASC) 2.5 MG tablet 90 tablet 1     Sig: Take 1 tablet by mouth daily     Current Outpatient Medications   Medication Sig Dispense Refill potassium chloride (KLOR-CON M) 10 MEQ extended release tablet TAKE 1 TABLET BY MOUTH  TWICE DAILY 180 tablet 1    atenolol-chlorthalidone (TENORETIC) 50-25 MG per tablet Take 1 tablet by mouth 2 times daily 180 tablet 1    amLODIPine (NORVASC) 2.5 MG tablet Take 1 tablet by mouth daily 90 tablet 1    magnesium oxide (MAG-OX) 400 MG tablet TAKE 2 TABLETS BY MOUTH IN THE MORNING AND 2 TABLETS AT BEDTIME 120 tablet 3    aspirin EC 81 MG EC tablet Take 81 mg by mouth daily      Cholecalciferol (VITAMIN D3) 50 MCG (2000 UT) CAPS Take by mouth      zinc gluconate 50 MG tablet Take 50 mg by mouth daily      nitroGLYCERIN (NITROSTAT) 0.4 MG SL tablet up to max of 3 total doses. If no relief after 1 dose, call 911. 25 tablet 3    clindamycin (CLEOCIN) 150 MG capsule Before dental procedures      hydroxychloroquine (PLAQUENIL) 200 MG tablet Take 200 mg by mouth nightly       tiZANidine (ZANAFLEX) 4 MG tablet Take 8 mg by mouth nightly       NONFORMULARY Take 2 tablets by mouth daily Eye promise      Multiple Vitamin TABS Take by mouth every morning      CALCIUM CITRATE PO Take 600 mg by mouth daily       omeprazole (PRILOSEC) 20 MG capsule Take 1 capsule by mouth daily   2    Ascorbic Acid (VITAMIN C) 1000 MG tablet Take 1,000 mg by mouth daily        No current facility-administered medications for this visit. No orders of the defined types were placed in this encounter. Continue current medications. Return in about 4 months (around 2/10/2023), or if symptoms worsen or fail to improve, for HTN. Discussed use, benefit, and side effects of prescribed medications. All patient questions answered. Pt voiced understanding. Instructed to continue current medications,diet and exercise. Patient agreed with treatment plan. Allergies, Problem List, Medications, Medical History, Family History, Surgical History and Tobacco History reviewed by provider.

## 2022-10-12 ENCOUNTER — NURSE ONLY (OUTPATIENT)
Dept: FAMILY MEDICINE CLINIC | Age: 72
End: 2022-10-12

## 2022-10-12 VITALS — DIASTOLIC BLOOD PRESSURE: 82 MMHG | HEART RATE: 76 BPM | SYSTOLIC BLOOD PRESSURE: 138 MMHG

## 2022-10-12 DIAGNOSIS — I10 ESSENTIAL HYPERTENSION: Primary | ICD-10-CM

## 2022-10-20 LAB — MAGNESIUM: 1.6 MG/DL (ref 1.6–2.6)

## 2022-11-16 ENCOUNTER — TELEPHONE (OUTPATIENT)
Dept: FAMILY MEDICINE CLINIC | Age: 72
End: 2022-11-16

## 2022-11-16 LAB — MAGNESIUM: 1.6 MG/DL (ref 1.6–2.6)

## 2022-11-16 NOTE — TELEPHONE ENCOUNTER
Pt stated she did not have an urinalysis done yesterday. She stated she doesn't know why [facility] would be calling. She stated she feels fine.

## 2022-11-16 NOTE — TELEPHONE ENCOUNTER
----- Message from Betty Cody MD sent at 11/16/2022 12:36 PM EST -----  Received a UA from [facility] please verify she was treated for UTI

## 2022-12-22 ENCOUNTER — TELEPHONE (OUTPATIENT)
Dept: NEPHROLOGY | Age: 72
End: 2022-12-22

## 2022-12-22 NOTE — TELEPHONE ENCOUNTER
Left message informing pt that her magnesium level is normal. Asked for a call back if she has any questions.

## 2022-12-22 NOTE — TELEPHONE ENCOUNTER
----- Message from Juana Rosario MD sent at 12/22/2022 11:51 AM EST -----  Magnesium level is normal.

## 2023-01-09 ENCOUNTER — OFFICE VISIT (OUTPATIENT)
Dept: NEPHROLOGY | Age: 73
End: 2023-01-09
Payer: MEDICARE

## 2023-01-09 VITALS
DIASTOLIC BLOOD PRESSURE: 79 MMHG | OXYGEN SATURATION: 96 % | BODY MASS INDEX: 28.28 KG/M2 | WEIGHT: 176 LBS | HEIGHT: 66 IN | SYSTOLIC BLOOD PRESSURE: 136 MMHG | HEART RATE: 64 BPM

## 2023-01-09 DIAGNOSIS — I10 PRIMARY HYPERTENSION: ICD-10-CM

## 2023-01-09 DIAGNOSIS — E83.42 HYPOMAGNESEMIA: Primary | ICD-10-CM

## 2023-01-09 PROCEDURE — 3075F SYST BP GE 130 - 139MM HG: CPT | Performed by: INTERNAL MEDICINE

## 2023-01-09 PROCEDURE — 1123F ACP DISCUSS/DSCN MKR DOCD: CPT | Performed by: INTERNAL MEDICINE

## 2023-01-09 PROCEDURE — 1090F PRES/ABSN URINE INCON ASSESS: CPT | Performed by: INTERNAL MEDICINE

## 2023-01-09 PROCEDURE — 99213 OFFICE O/P EST LOW 20 MIN: CPT | Performed by: INTERNAL MEDICINE

## 2023-01-09 PROCEDURE — 1036F TOBACCO NON-USER: CPT | Performed by: INTERNAL MEDICINE

## 2023-01-09 PROCEDURE — G8484 FLU IMMUNIZE NO ADMIN: HCPCS | Performed by: INTERNAL MEDICINE

## 2023-01-09 PROCEDURE — 3078F DIAST BP <80 MM HG: CPT | Performed by: INTERNAL MEDICINE

## 2023-01-09 PROCEDURE — G8417 CALC BMI ABV UP PARAM F/U: HCPCS | Performed by: INTERNAL MEDICINE

## 2023-01-09 PROCEDURE — G8427 DOCREV CUR MEDS BY ELIG CLIN: HCPCS | Performed by: INTERNAL MEDICINE

## 2023-01-09 PROCEDURE — 3017F COLORECTAL CA SCREEN DOC REV: CPT | Performed by: INTERNAL MEDICINE

## 2023-01-09 PROCEDURE — G8399 PT W/DXA RESULTS DOCUMENT: HCPCS | Performed by: INTERNAL MEDICINE

## 2023-01-09 RX ORDER — OXYCODONE HYDROCHLORIDE AND ACETAMINOPHEN 5; 325 MG/1; MG/1
TABLET ORAL PRN
COMMUNITY
Start: 2022-11-22

## 2023-01-09 RX ORDER — HYDROCODONE BITARTRATE AND ACETAMINOPHEN 5; 325 MG/1; MG/1
TABLET ORAL PRN
COMMUNITY
Start: 2022-12-12

## 2023-01-09 RX ORDER — TRAMADOL HYDROCHLORIDE 50 MG/1
50 TABLET ORAL PRN
COMMUNITY
Start: 2022-12-12

## 2023-01-09 NOTE — PROGRESS NOTES
Renal Progress Note    Assessment and Plan:      Diagnosis Orders   1. Hypomagnesemia  Magnesium    Basic Metabolic Panel    Magnesium      2. Primary hypertension  Basic Metabolic Panel                PLAN:  Lab result reviewed with the patient in epic  She understood well  Serum magnesium level is good 1.9 mg/dL  Hypomagnesemia is due to omeprazole which cannot be discontinued. Medications reviewed  No changes  Discontinue monthly magnesium check  Magnesium level in 3 months however  Return visit in 6 months with labs          Patient Active Problem List   Diagnosis    Hypertension    Degenerative arthritis of cervical spine    Osteoarthrosis    Heart palpitations    Primary osteoarthritis of left hip    Gastroesophageal reflux disease    CAD (coronary artery disease)           Subjective:   Chief complaint:  Chief Complaint   Patient presents with    Other     hypomagnesemia      HPI:This is a follow up visit for Ms. Lisa Francis who is here today for return appointment. I see her for hypomagnesemia. She was last seen about 6 months ago. No chest pain. No shortness of breath. Appetite is good. No nausea or vomiting. No diarrhea. ROS:  Pertinent positives stated above in HPI. All other systems were reviewed and were negative. Medications:     Current Outpatient Medications   Medication Sig Dispense Refill    traMADol (ULTRAM) 50 MG tablet 50 mg as needed.       potassium chloride (KLOR-CON M) 10 MEQ extended release tablet TAKE 1 TABLET BY MOUTH  TWICE DAILY 180 tablet 1    atenolol-chlorthalidone (TENORETIC) 50-25 MG per tablet Take 1 tablet by mouth 2 times daily 180 tablet 1    amLODIPine (NORVASC) 2.5 MG tablet Take 1 tablet by mouth daily 90 tablet 1    magnesium oxide (MAG-OX) 400 MG tablet TAKE 2 TABLETS BY MOUTH IN THE MORNING AND 2 TABLETS AT BEDTIME 120 tablet 3    aspirin EC 81 MG EC tablet Take 81 mg by mouth daily      Cholecalciferol (VITAMIN D3) 50 MCG (2000 UT) CAPS Take by mouth zinc gluconate 50 MG tablet Take 50 mg by mouth daily      clindamycin (CLEOCIN) 150 MG capsule Before dental procedures      hydroxychloroquine (PLAQUENIL) 200 MG tablet Take 200 mg by mouth nightly       tiZANidine (ZANAFLEX) 4 MG tablet Take 8 mg by mouth nightly       NONFORMULARY Take 2 tablets by mouth daily Eye promise      Multiple Vitamin TABS Take by mouth every morning      CALCIUM CITRATE PO Take 600 mg by mouth daily       omeprazole (PRILOSEC) 20 MG capsule Take 1 capsule by mouth daily   2    Ascorbic Acid (VITAMIN C) 1000 MG tablet Take 1,000 mg by mouth daily       oxyCODONE-acetaminophen (PERCOCET) 5-325 MG per tablet as needed. (Patient not taking: Reported on 1/9/2023)      HYDROcodone-acetaminophen (NORCO) 5-325 MG per tablet as needed. (Patient not taking: Reported on 1/9/2023)      nitroGLYCERIN (NITROSTAT) 0.4 MG SL tablet up to max of 3 total doses. If no relief after 1 dose, call 911. (Patient not taking: Reported on 1/9/2023) 25 tablet 3     No current facility-administered medications for this visit.        Lab Results:    CBC:   Lab Results   Component Value Date    WBC 15-20 (A) 09/23/2022    WBC 15-20 (A) 09/23/2022    HGB 13.4 05/02/2022    HCT 39.2 05/02/2022    MCV 92 05/02/2022     05/02/2022     BMP:    Lab Results   Component Value Date     05/02/2022     11/23/2021     09/10/2021    K 4.2 05/02/2022    K 4.0 03/23/2022    K 4.1 02/23/2022     05/02/2022     11/23/2021    CL 98 09/10/2021    CO2 101 05/02/2022    CO2 32 11/23/2021    CO2 33 (H) 09/10/2021    BUN 25 05/02/2022    BUN 16 11/23/2021    BUN 18 09/10/2021    CREATININE 0.92 05/02/2022    CREATININE 0.79 11/23/2021    CREATININE 0.81 09/10/2021    GLUCOSE NEGATIVE 09/23/2022    GLUCOSE 99 05/02/2022    GLUCOSE 94 11/23/2021      Hepatic:   Lab Results   Component Value Date    AST 25 08/23/2021    AST 27 08/22/2021    AST 21 01/08/2020    ALT 25 08/23/2021    ALT 26 08/22/2021    ALT 18 01/08/2020    BILITOT NEGATIVE 09/23/2022    BILITOT 0.5 08/23/2021    BILITOT 0.6 08/22/2021    ALKPHOS 54 08/23/2021    ALKPHOS 55 08/22/2021    ALKPHOS 64 01/08/2020     BNP: No results found for: BNP  Lipids:   Lab Results   Component Value Date    CHOL 160 08/23/2021    HDL 56 08/23/2021     INR:   Lab Results   Component Value Date    INR 1.01 11/20/2017    INR 1.03 04/25/2017    INR 1.09 02/07/2015     URINE:   Lab Results   Component Value Date/Time    PROTUR TRACE 09/23/2022 03:31 PM     Lab Results   Component Value Date/Time    NITRU NEGATIVE 09/23/2022 03:31 PM    COLORU YELLOW 09/23/2022 03:31 PM    PHUR 6.0 11/20/2017 09:20 AM    WBCUA 0-2 02/15/2016 08:49 AM    RBCUA moderate 07/07/2017 03:04 PM    RBCUA 0-2 02/15/2016 08:49 AM    YEAST NONE SEEN 02/15/2016 08:49 AM    BACTERIA NONE SEEN 09/23/2022 03:31 PM    BACTERIA NONE SEEN 09/23/2022 03:31 PM    CLARITYU Cloudy 07/07/2017 03:04 PM    SPECGRAV 1.015 07/07/2017 03:04 PM    LEUKOCYTESUR 1+ 09/23/2022 03:31 PM    UROBILINOGEN 0.2 09/23/2022 03:31 PM    BILIRUBINUR Negative 08/08/2019 02:46 PM    BLOODU Negative 08/08/2019 02:46 PM    BLOODU NEGATIVE 11/20/2017 09:20 AM    GLUCOSEU Negative 08/08/2019 02:46 PM    KETUA NEGATIVE 09/23/2022 03:31 PM      Microalbumen/Creatinine ratio:  No components found for: RUCREAT    Objective:   Vitals: /79 (Site: Left Upper Arm, Position: Sitting, Cuff Size: Small Adult)   Pulse 64   Ht 5' 6\" (1.676 m)   Wt 176 lb (79.8 kg)   SpO2 96%   BMI 28.41 kg/m²      Constitutional:  Alert, awake, no apparent distress  Skin:normal with no rash or any significant lesions  HEENT:Pupils are reactive . Throat is clear. Oral mucosa is moist.  Neck:supple with no thyromegaly, JVD, lymphadenopathy or bruit   Cardiovascular: Regular sinus rhythm without murmur, rubs or gallops   Respiratory:  Clear to auscultation with no wheezes or rales  Abdomen: Good bowel sound, soft, non tender and no bruit  Ext: No LE edema. Right upper extremity weakness noted. Musculoskeletal:Intact  Neuro:Alert, awake and oriented with right-sided upper extremity  focal deficit. Speech is normal.    Electronically signed by Anjana Post MD on 1/9/2023 at 8:21 AM   **This report has been created using voice recognition software. It maycontain minor  errors which are inherent in voice recognition technology. **

## 2023-02-13 ENCOUNTER — OFFICE VISIT (OUTPATIENT)
Dept: FAMILY MEDICINE CLINIC | Age: 73
End: 2023-02-13

## 2023-02-13 VITALS
HEART RATE: 76 BPM | RESPIRATION RATE: 12 BRPM | SYSTOLIC BLOOD PRESSURE: 130 MMHG | WEIGHT: 176.4 LBS | BODY MASS INDEX: 28.35 KG/M2 | DIASTOLIC BLOOD PRESSURE: 82 MMHG | HEIGHT: 66 IN

## 2023-02-13 DIAGNOSIS — I10 ESSENTIAL HYPERTENSION: Primary | ICD-10-CM

## 2023-02-13 DIAGNOSIS — K21.9 GASTROESOPHAGEAL REFLUX DISEASE, UNSPECIFIED WHETHER ESOPHAGITIS PRESENT: ICD-10-CM

## 2023-02-13 DIAGNOSIS — I25.10 CORONARY ARTERY DISEASE INVOLVING NATIVE CORONARY ARTERY OF NATIVE HEART WITHOUT ANGINA PECTORIS: ICD-10-CM

## 2023-02-13 PROCEDURE — 1123F ACP DISCUSS/DSCN MKR DOCD: CPT | Performed by: FAMILY MEDICINE

## 2023-02-13 PROCEDURE — 1036F TOBACCO NON-USER: CPT | Performed by: FAMILY MEDICINE

## 2023-02-13 PROCEDURE — G8427 DOCREV CUR MEDS BY ELIG CLIN: HCPCS | Performed by: FAMILY MEDICINE

## 2023-02-13 PROCEDURE — G8399 PT W/DXA RESULTS DOCUMENT: HCPCS | Performed by: FAMILY MEDICINE

## 2023-02-13 PROCEDURE — 1090F PRES/ABSN URINE INCON ASSESS: CPT | Performed by: FAMILY MEDICINE

## 2023-02-13 PROCEDURE — 3017F COLORECTAL CA SCREEN DOC REV: CPT | Performed by: FAMILY MEDICINE

## 2023-02-13 PROCEDURE — 99213 OFFICE O/P EST LOW 20 MIN: CPT | Performed by: FAMILY MEDICINE

## 2023-02-13 PROCEDURE — G8417 CALC BMI ABV UP PARAM F/U: HCPCS | Performed by: FAMILY MEDICINE

## 2023-02-13 SDOH — ECONOMIC STABILITY: FOOD INSECURITY: WITHIN THE PAST 12 MONTHS, THE FOOD YOU BOUGHT JUST DIDN'T LAST AND YOU DIDN'T HAVE MONEY TO GET MORE.: NEVER TRUE

## 2023-02-13 SDOH — ECONOMIC STABILITY: FOOD INSECURITY: WITHIN THE PAST 12 MONTHS, YOU WORRIED THAT YOUR FOOD WOULD RUN OUT BEFORE YOU GOT MONEY TO BUY MORE.: NEVER TRUE

## 2023-02-13 SDOH — ECONOMIC STABILITY: INCOME INSECURITY: HOW HARD IS IT FOR YOU TO PAY FOR THE VERY BASICS LIKE FOOD, HOUSING, MEDICAL CARE, AND HEATING?: NOT VERY HARD

## 2023-02-13 SDOH — ECONOMIC STABILITY: HOUSING INSECURITY
IN THE LAST 12 MONTHS, WAS THERE A TIME WHEN YOU DID NOT HAVE A STEADY PLACE TO SLEEP OR SLEPT IN A SHELTER (INCLUDING NOW)?: NO

## 2023-02-13 ASSESSMENT — PATIENT HEALTH QUESTIONNAIRE - PHQ9
10. IF YOU CHECKED OFF ANY PROBLEMS, HOW DIFFICULT HAVE THESE PROBLEMS MADE IT FOR YOU TO DO YOUR WORK, TAKE CARE OF THINGS AT HOME, OR GET ALONG WITH OTHER PEOPLE: 0
2. FEELING DOWN, DEPRESSED OR HOPELESS: 0
SUM OF ALL RESPONSES TO PHQ QUESTIONS 1-9: 0
7. TROUBLE CONCENTRATING ON THINGS, SUCH AS READING THE NEWSPAPER OR WATCHING TELEVISION: 0
8. MOVING OR SPEAKING SO SLOWLY THAT OTHER PEOPLE COULD HAVE NOTICED. OR THE OPPOSITE, BEING SO FIGETY OR RESTLESS THAT YOU HAVE BEEN MOVING AROUND A LOT MORE THAN USUAL: 0
6. FEELING BAD ABOUT YOURSELF - OR THAT YOU ARE A FAILURE OR HAVE LET YOURSELF OR YOUR FAMILY DOWN: 0
SUM OF ALL RESPONSES TO PHQ QUESTIONS 1-9: 0
1. LITTLE INTEREST OR PLEASURE IN DOING THINGS: 0
3. TROUBLE FALLING OR STAYING ASLEEP: 0
SUM OF ALL RESPONSES TO PHQ QUESTIONS 1-9: 0
4. FEELING TIRED OR HAVING LITTLE ENERGY: 0
SUM OF ALL RESPONSES TO PHQ QUESTIONS 1-9: 0
9. THOUGHTS THAT YOU WOULD BE BETTER OFF DEAD, OR OF HURTING YOURSELF: 0
5. POOR APPETITE OR OVEREATING: 0
SUM OF ALL RESPONSES TO PHQ9 QUESTIONS 1 & 2: 0

## 2023-02-13 ASSESSMENT — ENCOUNTER SYMPTOMS
BLURRED VISION: 0
HOARSE VOICE: 0
CHOKING: 0
SORE THROAT: 0
ABDOMINAL PAIN: 0
COUGH: 0
VOMITING: 0
NAUSEA: 0
GLOBUS SENSATION: 0
SHORTNESS OF BREATH: 0
STRIDOR: 0
CHEST TIGHTNESS: 0
CONSTIPATION: 0
DIARRHEA: 0
EYES NEGATIVE: 1

## 2023-02-13 NOTE — PROGRESS NOTES
Date: 2/13/2023    Cortney Cavanaugh is a 67 y.o. female who presents today for:  Chief Complaint   Patient presents with    Coronary Artery Disease she follows with Dr Pierre Tapia regularly. Hypertension    Gastroesophageal Reflux  She follows with Dr Siddharth Eisenberg and Luz Ndiaye. HPI:     Coronary Artery Disease  Presents for follow-up visit. Pertinent negatives include no chest pain, chest pressure, chest tightness, dizziness, leg swelling, muscle weakness, palpitations or shortness of breath. The symptoms have been stable. Compliance with diet is variable. Compliance with exercise is good. Compliance with medications is good. Hypertension  This is a chronic problem. The current episode started more than 1 year ago. The problem is unchanged. The problem is controlled. Pertinent negatives include no blurred vision, chest pain, headaches, malaise/fatigue, palpitations, peripheral edema, PND or shortness of breath. Risk factors for coronary artery disease include post-menopausal state. Past treatments include beta blockers, diuretics and calcium channel blockers. The current treatment provides significant improvement. There are no compliance problems. Hypertensive end-organ damage includes CAD/MI. Gastroesophageal Reflux  She reports no abdominal pain, no chest pain, no choking, no coughing, no early satiety, no globus sensation, no hoarse voice, no nausea, no sore throat or no stridor. This is a chronic problem. The current episode started more than 1 year ago. The problem has been unchanged. Pertinent negatives include no fatigue or muscle weakness. She has tried a PPI and a diet change for the symptoms. The treatment provided significant relief.      has a current medication list which includes the following prescription(s): potassium chloride, atenolol-chlorthalidone, amlodipine, magnesium oxide, aspirin ec, vitamin d3, zinc gluconate, nitroglycerin, clindamycin, hydroxychloroquine, tizanidine, NONFORMULARY, multiple vitamin, calcium citrate, omeprazole, and vitamin c.    Allergies   Allergen Reactions    Minocycline Swelling     Other reaction(s): 4506957    Sulfa Antibiotics Swelling     Other reaction(s): swelling    Bactrim Diarrhea    Erythromycin Diarrhea    Hydrocodone-Acetaminophen Other (See Comments)     Headache  Other reaction(s): bad headaches  Other reaction(s): 42784154    Macrobid [Nitrofurantoin] Other (See Comments)     She got a HA, upset stomach and decreased appetite.     Nortriptyline Itching     Other reaction(s): [id number]    Vicodin [Hydrocodone-Acetaminophen] Other (See Comments)     Headache.  severe       Social History     Tobacco Use    Smoking status: Never    Smokeless tobacco: Never   Vaping Use    Vaping Use: Never used   Substance Use Topics    Alcohol use: No    Drug use: No       Past Medical History:   Diagnosis Date    CAD (coronary artery disease)     Cervical radiculopathy     Degenerative arthritis of cervical spine     Degenerative lumbar disc     Fibromyalgia     GERD (gastroesophageal reflux disease)     Hydronephrosis 02/11/2016    right kidney    Hyperlipidemia     Hypertension     Hypokalemia     Hypomagnesemia     Osteoarthritis     neck,hands    Polymyalgia rheumatica (HCC)     Rheumatoid arthritis (HCC)     Spinal stenosis     UPJ (ureteropelvic junction) obstruction 06/22/2002    Vitreous detachment of left eye 1996    Vitreous detachment of right eye 2005       Past Surgical History:   Procedure Laterality Date    BACK SURGERY  05/19/2017    L2-5 Decompression L2-5 posterior fusion and tlif by Dr Larry    BREAST SURGERY  08/30/2004    biopsy, Dr. Napier - [facility]    CARDIAC CATHETERIZATION  02/07/2015    CARDIOVASCULAR STRESS TEST  2014    CATARACT REMOVAL WITH IMPLANT Left 04/25/2016    CATARACT REMOVAL WITH IMPLANT Right 05/09/2016    CERVICAL DISC SURGERY  01/18/2008    C5-6 discectomy, Dr. Diamond -[facility]    COLONOSCOPY  10/02 10/05 11/10    DIAGNOSTIC CARDIAC CATH  LAB PROCEDURE      Gege Quan, [facility]    HIP ARTHROSCOPY Right 2014    labrum repair and PSCAS lengthening - Dr. Ba Herrera in [facility] ([facility])  2001    Dr. Atilio Jha - [facility] Right 2014    TOTAL HIP, DR. Faustino Santizo - [facility]    JOINT REPLACEMENT Left 10/22/2018    LUMBAR [facility] SURGERY  2000    L3-5 discectomy, Dr. Ashley Larsen, [address] [place] [address] Ave  2001    L3-4 with cage, Dr. Terie Phoenix - [facility]  2016    radiofrequency ablation right SI joint - Dr. Jorge Beckman Right 2016    right SI joint    Robert Cure Right 2012    Dr. Steffany Rodríugez Left 2014    Dr. Monika Randolph    [facility] Left 2017    LEFT TOTAL HIP ARTHROPLASTY performed by Vadim Hunter MD at [address] [place] [address]  2016    [address] [place] [address]  D/T ureteropelvic junction obstruction, hydronephrosis       Family History   Problem Relation Age of Onset    Heart Disease Mother     Arthritis Father     Kidney Disease Father     Other Father         MDS   79 y/o    Heart Disease Father 61        cabg    Cancer Father         kidney    Stroke Paternal Grandmother     Diabetes Paternal Grandmother     High Blood Pressure Sister     High Cholesterol Sister     Breast Cancer Maternal Cousin     Breast Cancer Maternal Cousin     Breast Cancer Maternal Cousin     Breast Cancer Maternal Cousin     Breast Cancer Maternal Cousin 68     Subjective:     Review of Systems   Constitutional:  Negative for activity change, appetite change, diaphoresis, fatigue, fever and malaise/fatigue. HENT: Negative. Negative for hoarse voice and sore throat. Eyes: Negative. Negative for blurred vision.    Respiratory:  Negative for cough, choking, chest tightness and shortness of breath. Cardiovascular:  Negative for chest pain, palpitations, leg swelling and PND. Gastrointestinal:  Negative for abdominal pain, constipation, diarrhea, nausea and vomiting. Genitourinary: Negative. Musculoskeletal:  Positive for arthralgias. Negative for muscle weakness. She has shoulder pain bilaterally had right shoulder surgery in November and is having PT. She needs to do her other shoulder. Skin: Negative. Negative for rash. Neurological:  Negative for dizziness, syncope, weakness, light-headedness, numbness and headaches. Psychiatric/Behavioral: Negative.       :   /82 (Site: Right Upper Arm, Position: Sitting, Cuff Size: Medium Adult)   Pulse 76   Resp 12   Ht 5' 6\" (1.676 m)   Wt 176 lb 6.4 oz (80 kg)   BMI 28.47 kg/m²   Wt Readings from Last 3 Encounters:   02/13/23 176 lb 6.4 oz (80 kg)   01/09/23 176 lb (79.8 kg)   10/10/22 174 lb 4 oz (79 kg)     Physical Exam  Vitals and nursing note reviewed. Constitutional:       General: She is not in acute distress. Appearance: She is well-developed. She is not diaphoretic. HENT:      Head: Normocephalic and atraumatic. Eyes:      General: No scleral icterus. Right eye: No discharge. Left eye: No discharge. Conjunctiva/sclera: Conjunctivae normal.      Pupils: Pupils are equal, round, and reactive to light. Neck:      Thyroid: No thyromegaly. Vascular: No JVD. Cardiovascular:      Rate and Rhythm: Normal rate and regular rhythm. Heart sounds: Normal heart sounds. No murmur heard. Pulmonary:      Effort: No respiratory distress. Breath sounds: Normal breath sounds. No wheezing, rhonchi or rales. Abdominal:      General: Bowel sounds are normal. There is no distension. Palpations: Abdomen is soft. There is no mass. Tenderness: There is no abdominal tenderness. There is no guarding or rebound.    Musculoskeletal:      Cervical back: Normal range of motion and neck supple. Comments: Right shoulder pain and limited ROM    Lymphadenopathy:      Cervical: No cervical adenopathy. Skin:     General: Skin is warm and dry. Findings: No rash. Neurological:      Mental Status: She is alert and oriented to person, place, and time. Psychiatric:         Behavior: Behavior normal.     :       Diagnosis Orders   1. Essential hypertension        2. Gastroesophageal reflux disease, unspecified whether esophagitis present        3. Coronary artery disease involving native coronary artery of native heart without angina pectoris            :      Requested Prescriptions      No prescriptions requested or ordered in this encounter     Current Outpatient Medications   Medication Sig Dispense Refill    potassium chloride (KLOR-CON M) 10 MEQ extended release tablet TAKE 1 TABLET BY MOUTH  TWICE DAILY 180 tablet 1    atenolol-chlorthalidone (TENORETIC) 50-25 MG per tablet Take 1 tablet by mouth 2 times daily 180 tablet 1    amLODIPine (NORVASC) 2.5 MG tablet Take 1 tablet by mouth daily 90 tablet 1    magnesium oxide (MAG-OX) 400 MG tablet TAKE 2 TABLETS BY MOUTH IN THE MORNING AND 2 TABLETS AT BEDTIME 120 tablet 3    aspirin EC 81 MG EC tablet Take 81 mg by mouth daily      Cholecalciferol (VITAMIN D3) 50 MCG (2000 UT) CAPS Take by mouth      zinc gluconate 50 MG tablet Take 50 mg by mouth daily      nitroGLYCERIN (NITROSTAT) 0.4 MG SL tablet up to max of 3 total doses.  If no relief after 1 dose, call 911. 25 tablet 3    clindamycin (CLEOCIN) 150 MG capsule Before dental procedures      hydroxychloroquine (PLAQUENIL) 200 MG tablet Take 200 mg by mouth nightly       tiZANidine (ZANAFLEX) 4 MG tablet Take 8 mg by mouth nightly       NONFORMULARY Take 2 tablets by mouth daily [facility]      Multiple Vitamin TABS Take by mouth every morning      CALCIUM CITRATE PO Take 600 mg by mouth daily       omeprazole (PRILOSEC) 20 MG capsule Take 1 capsule by mouth daily 2    Ascorbic Acid (VITAMIN C) 1000 MG tablet Take 1,000 mg by mouth daily        No current facility-administered medications for this visit. No orders of the defined types were placed in this encounter. Continue current medications. Return in about 4 months (around 6/13/2023), or if symptoms worsen or fail to improve, for HTN. Discussed use, benefit, and side effects of prescribed medications. All patient questions answered. Pt voiced understanding. Instructed to continue current medications,diet and exercise. Patient agreed with treatment plan. Allergies, Problem List, Medications, Medical History, Family History, Surgical History and Tobacco History reviewed by provider.

## 2023-02-22 RX ORDER — OMEPRAZOLE 20 MG/1
20 CAPSULE, DELAYED RELEASE ORAL DAILY
Qty: 90 CAPSULE | Refills: 0 | Status: SHIPPED | OUTPATIENT
Start: 2023-02-22

## 2023-02-22 NOTE — TELEPHONE ENCOUNTER
Date of last visit:  2/13/2023  Date of next visit:  6/14/2023    Requested Prescriptions     Pending Prescriptions Disp Refills    omeprazole (PRILOSEC) 20 MG delayed release capsule 90 capsule 0     Sig: Take 1 capsule by mouth daily

## 2023-02-22 NOTE — TELEPHONE ENCOUNTER
Melissa Shultz called requesting a refill of their:    Omeprazole 20 mg QD    Send 90 day supply to [facility]

## 2023-03-02 DIAGNOSIS — E87.6 HYPOKALEMIA: ICD-10-CM

## 2023-03-02 RX ORDER — POTASSIUM CHLORIDE 750 MG/1
TABLET, EXTENDED RELEASE ORAL
Qty: 180 TABLET | Refills: 0 | Status: SHIPPED | OUTPATIENT
Start: 2023-03-02 | End: 2023-05-04

## 2023-03-22 ENCOUNTER — OFFICE VISIT (OUTPATIENT)
Dept: FAMILY MEDICINE CLINIC | Age: 73
End: 2023-03-22

## 2023-03-22 VITALS
WEIGHT: 173 LBS | BODY MASS INDEX: 27.8 KG/M2 | SYSTOLIC BLOOD PRESSURE: 130 MMHG | HEIGHT: 66 IN | RESPIRATION RATE: 12 BRPM | HEART RATE: 76 BPM | DIASTOLIC BLOOD PRESSURE: 76 MMHG

## 2023-03-22 DIAGNOSIS — S01.81XA LACERATION OF FOREHEAD, INITIAL ENCOUNTER: ICD-10-CM

## 2023-03-22 DIAGNOSIS — I10 ESSENTIAL HYPERTENSION: ICD-10-CM
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
## 2023-03-22 PROCEDURE — 3017F COLORECTAL CA SCREEN DOC REV: CPT | Performed by: FAMILY MEDICINE

## 2023-03-22 PROCEDURE — 1036F TOBACCO NON-USER: CPT | Performed by: FAMILY MEDICINE

## 2023-03-22 PROCEDURE — 1123F ACP DISCUSS/DSCN MKR DOCD: CPT | Performed by: FAMILY MEDICINE

## 2023-03-22 PROCEDURE — G8399 PT W/DXA RESULTS DOCUMENT: HCPCS | Performed by: FAMILY MEDICINE

## 2023-03-22 PROCEDURE — 1090F PRES/ABSN URINE INCON ASSESS: CPT | Performed by: FAMILY MEDICINE

## 2023-03-22 PROCEDURE — G8427 DOCREV CUR MEDS BY ELIG CLIN: HCPCS | Performed by: FAMILY MEDICINE

## 2023-03-22 PROCEDURE — G8417 CALC BMI ABV UP PARAM F/U: HCPCS | Performed by: FAMILY MEDICINE

## 2023-03-22 PROCEDURE — 99213 OFFICE O/P EST LOW 20 MIN: CPT | Performed by: FAMILY MEDICINE

## 2023-03-22 ASSESSMENT — ENCOUNTER SYMPTOMS
CHEST TIGHTNESS: 0
DIARRHEA: 0
NAUSEA: 0
EYES NEGATIVE: 1
COUGH: 0
VOMITING: 0
SHORTNESS OF BREATH: 0
ABDOMINAL PAIN: 0
CONSTIPATION: 0

## 2023-03-22 NOTE — PROGRESS NOTES
abrasion that she has surgical glue. Area is healing well and no drainage no redness seen. She has a hematoma to her right forehead area. Eyes:      General: No scleral icterus. Right eye: No discharge. Left eye: No discharge. Conjunctiva/sclera: Conjunctivae normal.      Pupils: Pupils are equal, round, and reactive to light. Neck:      Thyroid: No thyromegaly. Vascular: No JVD. Cardiovascular:      Rate and Rhythm: Normal rate and regular rhythm. Heart sounds: Normal heart sounds. No murmur heard. Pulmonary:      Effort: No respiratory distress. Breath sounds: Normal breath sounds. No wheezing, rhonchi or rales. Abdominal:      General: Bowel sounds are normal. There is no distension. Palpations: Abdomen is soft. There is no mass. Tenderness: There is no abdominal tenderness. There is no guarding or rebound. Musculoskeletal:         General: Normal range of motion. Cervical back: Normal range of motion and neck supple. Lymphadenopathy:      Cervical: No cervical adenopathy. Skin:     General: Skin is warm and dry. Findings: No rash. Neurological:      Mental Status: She is alert and oriented to person, place, and time. Psychiatric:         Behavior: Behavior normal.     :       Diagnosis Orders   1. Fall, initial encounter        2. Laceration of forehead, initial encounter        3.  Essential hypertension            :      Requested Prescriptions      No prescriptions requested or ordered in this encounter     Current Outpatient Medications   Medication Sig Dispense Refill    potassium chloride (KLOR-CON M) 10 MEQ extended release tablet TAKE 1 TABLET BY MOUTH  TWICE DAILY 180 tablet 0    omeprazole (PRILOSEC) 20 MG delayed release capsule Take 1 capsule by mouth daily 90 capsule 0    atenolol-chlorthalidone (TENORETIC) 50-25 MG per tablet Take 1 tablet by mouth 2 times daily 180 tablet 1    amLODIPine (NORVASC) 2.5 MG tablet Take 1 tablet

## 2023-04-05 DIAGNOSIS — I10 ESSENTIAL HYPERTENSION: ICD-10-CM

## 2023-04-06 NOTE — TELEPHONE ENCOUNTER
Date of last visit:  3/22/2023  Date of next visit:  6/14/2023    Requested Prescriptions     Pending Prescriptions Disp Refills    atenolol-chlorthalidone (TENORETIC) 50-25 MG per tablet [Pharmacy Med Name: Atenolol-Chlorthalidone 50-25 MG Oral Tablet] 180 tablet 1     Sig: TAKE 1 TABLET BY MOUTH  TWICE DAILY

## 2023-04-07 RX ORDER — ATENOLOL AND CHLORTHALIDONE TABLET 50; 25 MG/1; MG/1
TABLET ORAL
Qty: 180 TABLET | Refills: 1 | Status: SHIPPED | OUTPATIENT
Start: 2023-04-07

## 2023-04-13 ENCOUNTER — TELEPHONE (OUTPATIENT)
Dept: NEPHROLOGY | Age: 73
End: 2023-04-13

## 2023-04-13 DIAGNOSIS — E83.42 HYPOMAGNESEMIA: Primary | ICD-10-CM

## 2023-04-20 ENCOUNTER — TELEPHONE (OUTPATIENT)
Dept: NEPHROLOGY | Age: 73
End: 2023-04-20

## 2023-04-20 NOTE — TELEPHONE ENCOUNTER
Patient said she took 2 tabs 3 times a day she got diarrhea so bad. She is going to try to eat some magnesium rich foods and if you still want her to increase it. She will not do it until after her colonoscopy next week.

## 2023-04-20 NOTE — TELEPHONE ENCOUNTER
Noted. It may have been low from the diarrhea. Okay for her to continue what she is doing now. However I will still need a repeat magnesium in about a week.

## 2023-05-01 ENCOUNTER — HOSPITAL ENCOUNTER (OUTPATIENT)
Dept: WOMENS IMAGING | Age: 73
Discharge: HOME | End: 2023-05-01
Payer: MEDICARE

## 2023-05-01 DIAGNOSIS — Z12.31 VISIT FOR SCREENING MAMMOGRAM: ICD-10-CM

## 2023-05-01 PROCEDURE — 77063 BREAST TOMOSYNTHESIS BI: CPT

## 2023-05-03 ENCOUNTER — TELEPHONE (OUTPATIENT)
Dept: NEPHROLOGY | Age: 73
End: 2023-05-03

## 2023-05-04 DIAGNOSIS — E87.6 HYPOKALEMIA: ICD-10-CM

## 2023-05-04 RX ORDER — POTASSIUM CHLORIDE 750 MG/1
TABLET, EXTENDED RELEASE ORAL
Qty: 180 TABLET | Refills: 1 | Status: SHIPPED | OUTPATIENT
Start: 2023-05-04

## 2023-05-04 NOTE — TELEPHONE ENCOUNTER
Barbie Castellanos is requesting a refill on the following medications:  Requested Prescriptions     Pending Prescriptions Disp Refills    potassium chloride (KLOR-CON M) 10 MEQ extended release tablet [Pharmacy Med Name: Potassium Chloride Amber ER 10 MEQ Oral Tablet Extended Release] 180 tablet 1     Sig: TAKE 1 TABLET BY MOUTH TWICE  DAILY       Date of last visit: 3/22/2023  Date of next visit (if applicable):6/14/2023

## 2023-05-08 ENCOUNTER — TELEPHONE (OUTPATIENT)
Dept: NEPHROLOGY | Age: 73
End: 2023-05-08

## 2023-05-09 NOTE — TELEPHONE ENCOUNTER
I spoke to [facility] they received the face sheet, office note and order. They are trying to reach the patient to get her scheduled.

## 2023-05-09 NOTE — TELEPHONE ENCOUNTER
I spoke to Sherry Mathur and suggested she call [facility] and get the magnesium scheduled.

## 2023-05-11 ENCOUNTER — TELEPHONE (OUTPATIENT)
Dept: NEPHROLOGY | Age: 73
End: 2023-05-11

## 2023-05-11 NOTE — TELEPHONE ENCOUNTER
----- Message from Russell Urbina MD sent at 5/11/2023 11:23 AM EDT -----  Magnesium level is improving.

## 2023-05-16 RX ORDER — OMEPRAZOLE 20 MG/1
20 CAPSULE, DELAYED RELEASE ORAL DAILY
Qty: 90 CAPSULE | Refills: 0 | Status: SHIPPED | OUTPATIENT
Start: 2023-05-16 | End: 2023-06-27 | Stop reason: SDUPTHER

## 2023-06-07 ENCOUNTER — OFFICE VISIT (OUTPATIENT)
Dept: CARDIOLOGY CLINIC | Age: 73
End: 2023-06-07
Payer: MEDICARE

## 2023-06-07 VITALS
WEIGHT: 172 LBS | DIASTOLIC BLOOD PRESSURE: 90 MMHG | BODY MASS INDEX: 27.64 KG/M2 | SYSTOLIC BLOOD PRESSURE: 154 MMHG | RESPIRATION RATE: 18 BRPM | HEART RATE: 69 BPM | HEIGHT: 66 IN

## 2023-06-07 DIAGNOSIS — I25.10 CORONARY ARTERY DISEASE INVOLVING NATIVE CORONARY ARTERY OF NATIVE HEART WITHOUT ANGINA PECTORIS: Primary | ICD-10-CM

## 2023-06-07 DIAGNOSIS — E78.5 DYSLIPIDEMIA (HIGH LDL; LOW HDL): ICD-10-CM

## 2023-06-07 PROCEDURE — 93000 ELECTROCARDIOGRAM COMPLETE: CPT | Performed by: INTERNAL MEDICINE

## 2023-06-07 PROCEDURE — 3017F COLORECTAL CA SCREEN DOC REV: CPT | Performed by: INTERNAL MEDICINE

## 2023-06-07 PROCEDURE — 1123F ACP DISCUSS/DSCN MKR DOCD: CPT | Performed by: INTERNAL MEDICINE

## 2023-06-07 PROCEDURE — 99214 OFFICE O/P EST MOD 30 MIN: CPT | Performed by: INTERNAL MEDICINE

## 2023-06-07 PROCEDURE — G8427 DOCREV CUR MEDS BY ELIG CLIN: HCPCS | Performed by: INTERNAL MEDICINE

## 2023-06-07 PROCEDURE — 3077F SYST BP >= 140 MM HG: CPT | Performed by: INTERNAL MEDICINE

## 2023-06-07 PROCEDURE — 3079F DIAST BP 80-89 MM HG: CPT | Performed by: INTERNAL MEDICINE

## 2023-06-07 PROCEDURE — G8417 CALC BMI ABV UP PARAM F/U: HCPCS | Performed by: INTERNAL MEDICINE

## 2023-06-07 PROCEDURE — 1090F PRES/ABSN URINE INCON ASSESS: CPT | Performed by: INTERNAL MEDICINE

## 2023-06-07 PROCEDURE — 1036F TOBACCO NON-USER: CPT | Performed by: INTERNAL MEDICINE

## 2023-06-07 PROCEDURE — G8399 PT W/DXA RESULTS DOCUMENT: HCPCS | Performed by: INTERNAL MEDICINE

## 2023-06-07 ASSESSMENT — ENCOUNTER SYMPTOMS
VOMITING: 0
ABDOMINAL DISTENTION: 0
COLOR CHANGE: 0
TROUBLE SWALLOWING: 0
COUGH: 0
SHORTNESS OF BREATH: 0
APNEA: 0
WHEEZING: 0
ABDOMINAL PAIN: 0
VOICE CHANGE: 0
ANAL BLEEDING: 0
BLOOD IN STOOL: 0
STRIDOR: 0
CHOKING: 0
CHEST TIGHTNESS: 0
NAUSEA: 0

## 2023-06-07 NOTE — PROGRESS NOTES
[address]  Dept: [address]  Loc: [phone]     6/7/2023       Bobbi Valencia is here today for   Chief Complaint   Patient presents with    Follow-up           Referring Physician:  No ref. provider found     Patient Active Problem List   Diagnosis    Hypertension    Degenerative arthritis of cervical spine    Osteoarthrosis    Heart palpitations    Primary osteoarthritis of left hip    Gastroesophageal reflux disease    CAD (coronary artery disease)       Review of Systems   Constitutional:  Negative for activity change, appetite change, fatigue, fever and unexpected weight change. HENT:  Negative for congestion, trouble swallowing and voice change. Eyes:  Negative for visual disturbance. Respiratory:  Negative for apnea, cough, choking, chest tightness, shortness of breath, wheezing and stridor. Cardiovascular:  Negative for chest pain, palpitations and leg swelling. Gastrointestinal:  Negative for abdominal distention, abdominal pain, anal bleeding, blood in stool, nausea and vomiting. Endocrine: Negative for cold intolerance and heat intolerance. Genitourinary:  Negative for hematuria. Musculoskeletal:  Positive for arthralgias. Negative for gait problem, joint swelling and myalgias. Skin:  Negative for color change and rash. Allergic/Immunologic: Negative for environmental allergies and food allergies. Neurological:  Negative for dizziness, tremors, syncope, facial asymmetry, weakness, light-headedness, numbness and headaches. Hematological:  Does not bruise/bleed easily. Psychiatric/Behavioral:  Negative for agitation, behavioral problems and sleep disturbance.        Past Medical History:   Diagnosis Date    CAD (coronary artery disease)     Cervical radiculopathy     Degenerative arthritis of cervical spine     Degenerative lumbar disc     Fibromyalgia

## 2023-06-21 ENCOUNTER — HOSPITAL ENCOUNTER (OUTPATIENT)
Dept: NON INVASIVE DIAGNOSTICS | Age: 73
Discharge: HOME | End: 2023-06-21
Attending: INTERNAL MEDICINE
Payer: MEDICARE

## 2023-06-21 VITALS — BODY MASS INDEX: 27.32 KG/M2 | HEIGHT: 66 IN | WEIGHT: 170 LBS

## 2023-06-21 DIAGNOSIS — I25.10 CORONARY ARTERY DISEASE INVOLVING NATIVE CORONARY ARTERY OF NATIVE HEART WITHOUT ANGINA PECTORIS: ICD-10-CM

## 2023-06-21 PROCEDURE — A9500 TC99M SESTAMIBI: HCPCS | Performed by: INTERNAL MEDICINE

## 2023-06-21 PROCEDURE — 3430000000 HC RX DIAGNOSTIC RADIOPHARMACEUTICAL: Performed by: INTERNAL MEDICINE

## 2023-06-21 PROCEDURE — 78452 HT MUSCLE IMAGE SPECT MULT: CPT | Performed by: NUCLEAR MEDICINE

## 2023-06-21 PROCEDURE — 6360000002 HC RX W HCPCS

## 2023-06-21 PROCEDURE — 93017 CV STRESS TEST TRACING ONLY: CPT | Performed by: NUCLEAR MEDICINE

## 2023-06-21 RX ORDER — TETRAKIS(2-METHOXYISOBUTYLISOCYANIDE)COPPER(I) TETRAFLUOROBORATE 1 MG/ML
33 INJECTION, POWDER, LYOPHILIZED, FOR SOLUTION INTRAVENOUS
Status: COMPLETED | OUTPATIENT
Start: 2023-06-21 | End: 2023-06-21

## 2023-06-21 RX ORDER — TETRAKIS(2-METHOXYISOBUTYLISOCYANIDE)COPPER(I) TETRAFLUOROBORATE 1 MG/ML
9.2 INJECTION, POWDER, LYOPHILIZED, FOR SOLUTION INTRAVENOUS
Status: COMPLETED | OUTPATIENT
Start: 2023-06-21 | End: 2023-06-21

## 2023-06-21 RX ADMIN — Medication 9.2 MILLICURIE: at 07:29

## 2023-06-21 RX ADMIN — Medication 33 MILLICURIE: at 08:48

## 2023-06-27 ENCOUNTER — HOSPITAL ENCOUNTER (EMERGENCY)
Age: 73
Discharge: HOME | End: 2023-06-27
Attending: EMERGENCY MEDICINE
Payer: MEDICARE

## 2023-06-27 ENCOUNTER — APPOINTMENT (OUTPATIENT)
Dept: GENERAL RADIOLOGY | Age: 73
End: 2023-06-27
Payer: MEDICARE

## 2023-06-27 VITALS
HEART RATE: 60 BPM | DIASTOLIC BLOOD PRESSURE: 73 MMHG | TEMPERATURE: 97.8 F | RESPIRATION RATE: 17 BRPM | WEIGHT: 170 LBS | SYSTOLIC BLOOD PRESSURE: 141 MMHG | OXYGEN SATURATION: 97 % | HEIGHT: 66 IN | BODY MASS INDEX: 27.32 KG/M2

## 2023-06-27 DIAGNOSIS — R07.9 CHEST PAIN, UNSPECIFIED TYPE: Primary | ICD-10-CM

## 2023-06-27 LAB
ALBUMIN SERPL BCG-MCNC: 3.8 G/DL (ref 3.5–5.1)
ALP SERPL-CCNC: 71 U/L (ref 38–126)
ALT SERPL W/O P-5'-P-CCNC: 23 U/L (ref 11–66)
ANION GAP SERPL CALC-SCNC: 12 MEQ/L (ref 8–16)
AST SERPL-CCNC: 22 U/L (ref 5–40)
BASOPHILS ABSOLUTE: 0.1 THOU/MM3 (ref 0–0.1)
BASOPHILS NFR BLD AUTO: 0.6 %
BILIRUB CONJ SERPL-MCNC: < 0.2 MG/DL (ref 0–0.3)
BILIRUB SERPL-MCNC: 0.3 MG/DL (ref 0.3–1.2)
BUN SERPL-MCNC: 22 MG/DL (ref 7–22)
CALCIUM SERPL-MCNC: 9.8 MG/DL (ref 8.5–10.5)
CHLORIDE SERPL-SCNC: 102 MEQ/L (ref 98–111)
CO2 SERPL-SCNC: 23 MEQ/L (ref 23–33)
CREAT SERPL-MCNC: 0.8 MG/DL (ref 0.4–1.2)
DEPRECATED RDW RBC AUTO: 44.2 FL (ref 35–45)
EOSINOPHIL NFR BLD AUTO: 1.4 %
EOSINOPHILS ABSOLUTE: 0.1 THOU/MM3 (ref 0–0.4)
ERYTHROCYTE [DISTWIDTH] IN BLOOD BY AUTOMATED COUNT: 12.9 % (ref 11.5–14.5)
GFR SERPL CREATININE-BSD FRML MDRD: > 60 ML/MIN/1.73M2
GLUCOSE SERPL-MCNC: 151 MG/DL (ref 70–108)
HCT VFR BLD AUTO: 39 % (ref 37–47)
HGB BLD-MCNC: 12.6 GM/DL (ref 12–16)
IMM GRANULOCYTES # BLD AUTO: 0.07 THOU/MM3 (ref 0–0.07)
IMM GRANULOCYTES NFR BLD AUTO: 0.7 %
LIPASE SERPL-CCNC: 39.8 U/L (ref 5.6–51.3)
LYMPHOCYTES ABSOLUTE: 2.2 THOU/MM3 (ref 1–4.8)
LYMPHOCYTES NFR BLD AUTO: 20.2 %
MAGNESIUM SERPL-MCNC: 1.5 MG/DL (ref 1.6–2.4)
MCH RBC QN AUTO: 30.4 PG (ref 26–33)
MCHC RBC AUTO-ENTMCNC: 32.3 GM/DL (ref 32.2–35.5)
MCV RBC AUTO: 94 FL (ref 81–99)
MONOCYTES ABSOLUTE: 0.7 THOU/MM3 (ref 0.4–1.3)
MONOCYTES NFR BLD AUTO: 6.5 %
NEUTROPHILS NFR BLD AUTO: 70.6 %
NRBC BLD AUTO-RTO: 0 /100 WBC
NT-PROBNP SERPL IA-MCNC: 180.6 PG/ML (ref 0–124)
OSMOLALITY SERPL CALC.SUM OF ELEC: 280.1 MOSMOL/KG (ref 275–300)
PLATELET # BLD AUTO: 223 THOU/MM3 (ref 130–400)
PMV BLD AUTO: 8.9 FL (ref 9.4–12.4)
POTASSIUM SERPL-SCNC: 3.4 MEQ/L (ref 3.5–5.2)
PROT SERPL-MCNC: 6.8 G/DL (ref 6.1–8)
RBC # BLD AUTO: 4.15 MILL/MM3 (ref 4.2–5.4)
SEGMENTED NEUTROPHILS ABSOLUTE COUNT: 7.6 THOU/MM3 (ref 1.8–7.7)
SODIUM SERPL-SCNC: 137 MEQ/L (ref 135–145)
TROPONIN T: < 0.01 NG/ML
TROPONIN T: < 0.01 NG/ML
WBC # BLD AUTO: 10.7 THOU/MM3 (ref 4.8–10.8)

## 2023-06-27 PROCEDURE — 36415 COLL VENOUS BLD VENIPUNCTURE: CPT

## 2023-06-27 PROCEDURE — 93010 ELECTROCARDIOGRAM REPORT: CPT | Performed by: INTERNAL MEDICINE

## 2023-06-27 PROCEDURE — 83735 ASSAY OF MAGNESIUM: CPT

## 2023-06-27 PROCEDURE — 83690 ASSAY OF LIPASE: CPT

## 2023-06-27 PROCEDURE — 83880 ASSAY OF NATRIURETIC PEPTIDE: CPT

## 2023-06-27 PROCEDURE — 71045 X-RAY EXAM CHEST 1 VIEW: CPT

## 2023-06-27 PROCEDURE — 6370000000 HC RX 637 (ALT 250 FOR IP): Performed by: EMERGENCY MEDICINE

## 2023-06-27 PROCEDURE — 85025 COMPLETE CBC W/AUTO DIFF WBC: CPT

## 2023-06-27 PROCEDURE — 84484 ASSAY OF TROPONIN QUANT: CPT

## 2023-06-27 PROCEDURE — 6360000002 HC RX W HCPCS: Performed by: EMERGENCY MEDICINE

## 2023-06-27 PROCEDURE — 96374 THER/PROPH/DIAG INJ IV PUSH: CPT

## 2023-06-27 PROCEDURE — 80053 COMPREHEN METABOLIC PANEL: CPT

## 2023-06-27 PROCEDURE — 93005 ELECTROCARDIOGRAM TRACING: CPT | Performed by: EMERGENCY MEDICINE

## 2023-06-27 PROCEDURE — 82248 BILIRUBIN DIRECT: CPT

## 2023-06-27 PROCEDURE — 99285 EMERGENCY DEPT VISIT HI MDM: CPT

## 2023-06-27 RX ORDER — ASPIRIN 81 MG/1
324 TABLET, CHEWABLE ORAL ONCE
Status: COMPLETED | OUTPATIENT
Start: 2023-06-27 | End: 2023-06-27

## 2023-06-27 RX ORDER — OMEPRAZOLE 20 MG/1
20 CAPSULE, DELAYED RELEASE ORAL DAILY
Qty: 90 CAPSULE | Refills: 0 | Status: SHIPPED | OUTPATIENT
Start: 2023-06-27

## 2023-06-27 RX ORDER — KETOROLAC TROMETHAMINE 30 MG/ML
15 INJECTION, SOLUTION INTRAMUSCULAR; INTRAVENOUS ONCE
Status: COMPLETED | OUTPATIENT
Start: 2023-06-27 | End: 2023-06-27

## 2023-06-27 RX ORDER — CALCIUM CARBONATE 500 MG/1
1 TABLET, CHEWABLE ORAL DAILY
Qty: 30 TABLET | Refills: 0 | Status: SHIPPED | OUTPATIENT
Start: 2023-06-27 | End: 2023-07-27

## 2023-06-27 RX ORDER — PANTOPRAZOLE SODIUM 40 MG/1
40 TABLET, DELAYED RELEASE ORAL ONCE
Status: DISCONTINUED | OUTPATIENT
Start: 2023-06-27 | End: 2023-06-27 | Stop reason: HOSPADM

## 2023-06-27 RX ADMIN — KETOROLAC TROMETHAMINE 15 MG: 30 INJECTION, SOLUTION INTRAMUSCULAR; INTRAVENOUS at 11:45

## 2023-06-27 RX ADMIN — ASPIRIN 324 MG: 81 TABLET, CHEWABLE ORAL at 10:45

## 2023-06-27 RX ADMIN — Medication: at 10:44

## 2023-06-27 ASSESSMENT — ENCOUNTER SYMPTOMS
RHINORRHEA: 0
PHOTOPHOBIA: 0
ABDOMINAL PAIN: 0
CHEST TIGHTNESS: 0
BLOOD IN STOOL: 0
VOICE CHANGE: 0
EYE DISCHARGE: 0
EYE ITCHING: 0
EYE REDNESS: 0
ABDOMINAL DISTENTION: 0
DIARRHEA: 0
WHEEZING: 0
CHOKING: 0
TROUBLE SWALLOWING: 0
SHORTNESS OF BREATH: 0
NAUSEA: 0
BACK PAIN: 0
VOMITING: 0
SORE THROAT: 0
CONSTIPATION: 0
COUGH: 0
EYE PAIN: 0
SINUS PRESSURE: 0

## 2023-06-27 ASSESSMENT — HEART SCORE: ECG: 1

## 2023-06-27 ASSESSMENT — PAIN DESCRIPTION - DESCRIPTORS: DESCRIPTORS: PRESSURE

## 2023-06-27 ASSESSMENT — PAIN SCALES - GENERAL
PAINLEVEL_OUTOF10: 5
PAINLEVEL_OUTOF10: 5

## 2023-06-27 ASSESSMENT — PAIN - FUNCTIONAL ASSESSMENT: PAIN_FUNCTIONAL_ASSESSMENT: 0-10

## 2023-06-27 ASSESSMENT — PAIN DESCRIPTION - ORIENTATION: ORIENTATION: LOWER

## 2023-06-27 ASSESSMENT — PAIN DESCRIPTION - LOCATION
LOCATION: CHEST
LOCATION: ABDOMEN

## 2023-06-29 ENCOUNTER — TELEPHONE (OUTPATIENT)
Dept: FAMILY MEDICINE CLINIC | Age: 73
End: 2023-06-29

## 2023-06-29 DIAGNOSIS — R79.0 LOW MAGNESIUM LEVEL: Primary | ICD-10-CM

## 2023-07-01 LAB
EKG ATRIAL RATE: 72 BPM
EKG P AXIS: 72 DEGREES
EKG P-R INTERVAL: 190 MS
EKG Q-T INTERVAL: 386 MS
EKG QRS DURATION: 92 MS
EKG QTC CALCULATION (BAZETT): 422 MS
EKG R AXIS: 8 DEGREES
EKG T AXIS: -24 DEGREES
EKG VENTRICULAR RATE: 72 BPM

## 2023-07-05 DIAGNOSIS — E83.42 HYPOMAGNESEMIA: Primary | ICD-10-CM

## 2023-07-05 DIAGNOSIS — I10 PRIMARY HYPERTENSION: ICD-10-CM

## 2023-07-05 LAB
ANION GAP SERPL CALCULATED.3IONS-SCNC: 11 MEQ/L (ref 7–16)
BUN BLDV-MCNC: 21 MG/DL (ref 8–23)
CALCIUM SERPL-MCNC: 10.6 MG/DL (ref 8.5–10.5)
CHLORIDE BLD-SCNC: 97 MEQ/L (ref 95–107)
CO2: 30 MEQ/L (ref 19–31)
CREAT SERPL-MCNC: 0.86 MG/DL (ref 0.6–1.3)
EGFR IF NONAFRICAN AMERICAN: 71 ML/MIN/1.73
GLUCOSE: 99 MG/DL (ref 70–99)
MAGNESIUM: 1.7 MG/DL (ref 1.6–2.6)
POTASSIUM SERPL-SCNC: 4.2 MEQ/L (ref 3.5–5.4)
SODIUM BLD-SCNC: 138 MEQ/L (ref 133–146)

## 2023-07-06 ENCOUNTER — OFFICE VISIT (OUTPATIENT)
Dept: CARDIOLOGY CLINIC | Age: 73
End: 2023-07-06
Payer: MEDICARE

## 2023-07-06 VITALS
RESPIRATION RATE: 18 BRPM | HEART RATE: 77 BPM | HEIGHT: 66 IN | DIASTOLIC BLOOD PRESSURE: 73 MMHG | SYSTOLIC BLOOD PRESSURE: 138 MMHG | BODY MASS INDEX: 26.2 KG/M2 | WEIGHT: 163 LBS

## 2023-07-06 DIAGNOSIS — I25.10 CORONARY ARTERY DISEASE INVOLVING NATIVE CORONARY ARTERY OF NATIVE HEART WITHOUT ANGINA PECTORIS: Primary | ICD-10-CM

## 2023-07-06 PROCEDURE — G8399 PT W/DXA RESULTS DOCUMENT: HCPCS | Performed by: INTERNAL MEDICINE

## 2023-07-06 PROCEDURE — G8427 DOCREV CUR MEDS BY ELIG CLIN: HCPCS | Performed by: INTERNAL MEDICINE

## 2023-07-06 PROCEDURE — 1123F ACP DISCUSS/DSCN MKR DOCD: CPT | Performed by: INTERNAL MEDICINE

## 2023-07-06 PROCEDURE — 3075F SYST BP GE 130 - 139MM HG: CPT | Performed by: INTERNAL MEDICINE

## 2023-07-06 PROCEDURE — 99213 OFFICE O/P EST LOW 20 MIN: CPT | Performed by: INTERNAL MEDICINE

## 2023-07-06 PROCEDURE — 1036F TOBACCO NON-USER: CPT | Performed by: INTERNAL MEDICINE

## 2023-07-06 PROCEDURE — 1090F PRES/ABSN URINE INCON ASSESS: CPT | Performed by: INTERNAL MEDICINE

## 2023-07-06 PROCEDURE — 93000 ELECTROCARDIOGRAM COMPLETE: CPT | Performed by: INTERNAL MEDICINE

## 2023-07-06 PROCEDURE — G8417 CALC BMI ABV UP PARAM F/U: HCPCS | Performed by: INTERNAL MEDICINE

## 2023-07-06 PROCEDURE — 3078F DIAST BP <80 MM HG: CPT | Performed by: INTERNAL MEDICINE

## 2023-07-06 PROCEDURE — 3017F COLORECTAL CA SCREEN DOC REV: CPT | Performed by: INTERNAL MEDICINE

## 2023-07-06 ASSESSMENT — ENCOUNTER SYMPTOMS
VOICE CHANGE: 0
VOMITING: 0
NAUSEA: 0
STRIDOR: 0
CHOKING: 0
ABDOMINAL PAIN: 0
CHEST TIGHTNESS: 0
SHORTNESS OF BREATH: 0
ABDOMINAL DISTENTION: 0
COLOR CHANGE: 0
ANAL BLEEDING: 0
WHEEZING: 0
TROUBLE SWALLOWING: 0
BLOOD IN STOOL: 0
APNEA: 0
COUGH: 0

## 2023-07-06 NOTE — PROGRESS NOTES
HDL is high and she does not want to take a statin citing that she has a lot of muscle pain and joint pain. The patient will continue the current medication and she will be seen in 10 weeks otherwise seek medical attention should any change in clinical condition patient was instructed to take her potassium twice a day and she will follow-up with family physician. End of dictation thank    Orders Placed This Encounter   Procedures    41823 - CO ELECTROCARDIOGRAM, COMPLETE       Return in about 10 weeks (around 9/14/2023) for cad.      Tanisha Hall MD

## 2023-07-10 ENCOUNTER — OFFICE VISIT (OUTPATIENT)
Dept: NEPHROLOGY | Age: 73
End: 2023-07-10
Payer: MEDICARE

## 2023-07-10 VITALS
OXYGEN SATURATION: 95 % | SYSTOLIC BLOOD PRESSURE: 140 MMHG | BODY MASS INDEX: 26.84 KG/M2 | DIASTOLIC BLOOD PRESSURE: 89 MMHG | WEIGHT: 167 LBS | HEIGHT: 66 IN | HEART RATE: 64 BPM

## 2023-07-10 DIAGNOSIS — I10 PRIMARY HYPERTENSION: ICD-10-CM

## 2023-07-10 DIAGNOSIS — E83.42 HYPOMAGNESEMIA: Primary | ICD-10-CM

## 2023-07-10 DIAGNOSIS — E83.52 HYPERCALCEMIA: ICD-10-CM

## 2023-07-10 PROCEDURE — G8399 PT W/DXA RESULTS DOCUMENT: HCPCS | Performed by: INTERNAL MEDICINE

## 2023-07-10 PROCEDURE — 3077F SYST BP >= 140 MM HG: CPT | Performed by: INTERNAL MEDICINE

## 2023-07-10 PROCEDURE — 1036F TOBACCO NON-USER: CPT | Performed by: INTERNAL MEDICINE

## 2023-07-10 PROCEDURE — 1090F PRES/ABSN URINE INCON ASSESS: CPT | Performed by: INTERNAL MEDICINE

## 2023-07-10 PROCEDURE — 1123F ACP DISCUSS/DSCN MKR DOCD: CPT | Performed by: INTERNAL MEDICINE

## 2023-07-10 PROCEDURE — G8417 CALC BMI ABV UP PARAM F/U: HCPCS | Performed by: INTERNAL MEDICINE

## 2023-07-10 PROCEDURE — 99213 OFFICE O/P EST LOW 20 MIN: CPT | Performed by: INTERNAL MEDICINE

## 2023-07-10 PROCEDURE — G8427 DOCREV CUR MEDS BY ELIG CLIN: HCPCS | Performed by: INTERNAL MEDICINE

## 2023-07-10 PROCEDURE — 3079F DIAST BP 80-89 MM HG: CPT | Performed by: INTERNAL MEDICINE

## 2023-07-10 PROCEDURE — 3017F COLORECTAL CA SCREEN DOC REV: CPT | Performed by: INTERNAL MEDICINE

## 2023-07-10 NOTE — PROGRESS NOTES
Renal Progress Note    Assessment and Plan:      Diagnosis Orders   1. Hypomagnesemia        2. Hypercalcemia        3. Primary hypertension                  PLAN:  Results reviewed with the patient today in epic  She understood  I addressed her questions to her satisfaction  Serum calcium is borderline high at 10.6 mg/dL. Serum magnesium is 1.7 mg/dL which is an improvement from 1.5 mg/dL on 27 June 2023 and is normal.  Medications reviewed  Suggest decreasing oral calcium from 1 tablet a day to 1 tablet 3 days a week. Patient is agreeable to that. Printed instruction provided to her  Return visit in 6 months with          Patient Active Problem List   Diagnosis    Hypertension    Degenerative arthritis of cervical spine    Osteoarthrosis    Heart palpitations    Primary osteoarthritis of left hip    Gastroesophageal reflux disease    CAD (coronary artery disease)           Subjective:   Chief complaint:  Chief Complaint   Patient presents with    Other     hypomagnesemia      HPI:This is a follow up visit for Ms. Pablo Wallace who is here today for return appointment. I see her for electrolyte imbalances specifically hypomagnesemia. She was last seen about 6 months ago. Serum magnesium level was 1.9 mg/dL. Today it is 1.7 mg/L. However about 2 weeks ago it was 1.5 mg/dL. She has chronic hypomagnesemia due to proton pump inhibitor omeprazole that cannot be discontinued as she has  gastroesophageal reflux disease. She denies any symptoms of gastroesophageal reflux disease. Denies chest pain or shortness of breath. Appetite is good. ROS:  Pertinent positives stated above in HPI. All other systems were reviewed and were negative.   Medications:     Current Outpatient Medications   Medication Sig Dispense Refill    omeprazole (PRILOSEC) 20 MG delayed release capsule Take 1 capsule by mouth daily 90 capsule 0    calcium carbonate (ANTACID) 500 MG chewable tablet Take 1 tablet by mouth daily 30 tablet 0

## 2023-07-24 NOTE — PROGRESS NOTES
4426 Pt arrived to special procedure room 2 for L SI injection. 0257 Procedure explained using teach back method. Pt states understanding. 7676 This procedure has been fully reviewed with the patient and written informed consent has been obtained. 8730 Patient assisted to table in prone position. Comfort ensured. 5508 Pre-procedure images obtained. 1391 Procedure begins. 1672 Procedure complete. 1459 Patient assisted to seated position. Comfort ensured. 0203 Patient ambulated to discharge lobby in stable condition. Patient is scheduled for a colonoscopy with Dr Copeland  on 9/13/23. Please send Nulytely prep to patient's selected pharmacy selected during scheduling.       Thank you, [facility] Scheduler

## 2023-08-09 RX ORDER — OMEPRAZOLE 20 MG/1
20 CAPSULE, DELAYED RELEASE ORAL
Qty: 90 CAPSULE | Refills: 1 | Status: SHIPPED | OUTPATIENT
Start: 2023-08-09

## 2023-08-09 NOTE — TELEPHONE ENCOUNTER
Date of last visit:  6/14/2023  Date of next visit:  10/16/2023    Requested Prescriptions     Pending Prescriptions Disp Refills    omeprazole (PRILOSEC) 20 MG delayed release capsule [Pharmacy Med Name: Omeprazole 20 MG Oral Capsule Delayed Release] 90 capsule 1     Sig: TAKE 1 CAPSULE BY MOUTH DAILY

## 2023-08-24 ENCOUNTER — OFFICE VISIT (OUTPATIENT)
Dept: CARDIOLOGY CLINIC | Age: 73
End: 2023-08-24
Payer: MEDICARE

## 2023-08-24 VITALS
WEIGHT: 168 LBS | HEIGHT: 66 IN | BODY MASS INDEX: 27 KG/M2 | HEART RATE: 66 BPM | SYSTOLIC BLOOD PRESSURE: 128 MMHG | RESPIRATION RATE: 18 BRPM | DIASTOLIC BLOOD PRESSURE: 70 MMHG

## 2023-08-24 DIAGNOSIS — I25.10 CORONARY ARTERY DISEASE INVOLVING NATIVE CORONARY ARTERY OF NATIVE HEART WITHOUT ANGINA PECTORIS: Primary | ICD-10-CM

## 2023-08-24 DIAGNOSIS — E78.5 DYSLIPIDEMIA (HIGH LDL; LOW HDL): ICD-10-CM

## 2023-08-24 PROCEDURE — 93000 ELECTROCARDIOGRAM COMPLETE: CPT | Performed by: INTERNAL MEDICINE

## 2023-08-24 PROCEDURE — G8427 DOCREV CUR MEDS BY ELIG CLIN: HCPCS | Performed by: INTERNAL MEDICINE

## 2023-08-24 PROCEDURE — 99214 OFFICE O/P EST MOD 30 MIN: CPT | Performed by: INTERNAL MEDICINE

## 2023-08-24 PROCEDURE — 3017F COLORECTAL CA SCREEN DOC REV: CPT | Performed by: INTERNAL MEDICINE

## 2023-08-24 PROCEDURE — 1123F ACP DISCUSS/DSCN MKR DOCD: CPT | Performed by: INTERNAL MEDICINE

## 2023-08-24 PROCEDURE — 1090F PRES/ABSN URINE INCON ASSESS: CPT | Performed by: INTERNAL MEDICINE

## 2023-08-24 PROCEDURE — 1036F TOBACCO NON-USER: CPT | Performed by: INTERNAL MEDICINE

## 2023-08-24 PROCEDURE — G8399 PT W/DXA RESULTS DOCUMENT: HCPCS | Performed by: INTERNAL MEDICINE

## 2023-08-24 PROCEDURE — G8417 CALC BMI ABV UP PARAM F/U: HCPCS | Performed by: INTERNAL MEDICINE

## 2023-08-24 PROCEDURE — 3074F SYST BP LT 130 MM HG: CPT | Performed by: INTERNAL MEDICINE

## 2023-08-24 PROCEDURE — 3078F DIAST BP <80 MM HG: CPT | Performed by: INTERNAL MEDICINE

## 2023-08-24 RX ORDER — GABAPENTIN 100 MG/1
CAPSULE ORAL
COMMUNITY
Start: 2023-08-11

## 2023-08-24 ASSESSMENT — ENCOUNTER SYMPTOMS
CHOKING: 0
APNEA: 0
VOICE CHANGE: 0
BLOOD IN STOOL: 0
TROUBLE SWALLOWING: 0
SHORTNESS OF BREATH: 0
STRIDOR: 0
ABDOMINAL DISTENTION: 0
CHEST TIGHTNESS: 0
COLOR CHANGE: 0
COUGH: 0
VOMITING: 0
ANAL BLEEDING: 0
ABDOMINAL PAIN: 0
NAUSEA: 0
WHEEZING: 0

## 2023-09-17 DIAGNOSIS — I10 ESSENTIAL HYPERTENSION: ICD-10-CM

## 2023-09-18 RX ORDER — ATENOLOL AND CHLORTHALIDONE TABLET 50; 25 MG/1; MG/1
TABLET ORAL
Qty: 180 TABLET | Refills: 1 | Status: SHIPPED | OUTPATIENT
Start: 2023-09-18

## 2023-09-18 NOTE — TELEPHONE ENCOUNTER
The pharmacy is  requesting a refill of the below medication which has been pended for you:     Requested Prescriptions     Pending Prescriptions Disp Refills    atenolol-chlorthalidone (TENORETIC) 50-25 MG per tablet [Pharmacy Med Name: Atenolol-Chlorthalidone 50-25 MG Oral Tablet] 180 tablet 1     Sig: TAKE 1 TABLET BY MOUTH TWICE  DAILY       Last Appointment Date: 6/14/2023  Next Appointment Date: 10/16/2023    Allergies   Allergen Reactions    Minocycline Swelling     Other reaction(s): [id number]    Sulfa Antibiotics Swelling     Other reaction(s): swelling    Bactrim Diarrhea    Erythromycin Diarrhea    Hydrocodone-Acetaminophen Other (See Comments)     Headache  Other reaction(s): bad headaches  Other reaction(s): [id number]    Macrobid [Nitrofurantoin] Other (See Comments)     She got a HA, upset stomach and decreased appetite. Nortriptyline Itching     Other reaction(s): [id number]    Vicodin [Hydrocodone-Acetaminophen] Other (See Comments)     Headache.  severe       Per verbal 9order Dr Nell Arias sent Rx to pharmacy.

## 2023-10-16 ENCOUNTER — OFFICE VISIT (OUTPATIENT)
Dept: FAMILY MEDICINE CLINIC | Age: 73
End: 2023-10-16

## 2023-10-16 VITALS
DIASTOLIC BLOOD PRESSURE: 74 MMHG | BODY MASS INDEX: 28.19 KG/M2 | RESPIRATION RATE: 12 BRPM | HEART RATE: 72 BPM | WEIGHT: 175.4 LBS | HEIGHT: 66 IN | SYSTOLIC BLOOD PRESSURE: 128 MMHG

## 2023-10-16 DIAGNOSIS — I10 ESSENTIAL HYPERTENSION: Primary | ICD-10-CM

## 2023-10-16 DIAGNOSIS — E83.42 HYPOMAGNESEMIA: ICD-10-CM

## 2023-10-16 DIAGNOSIS — E87.6 HYPOKALEMIA: ICD-10-CM

## 2023-10-16 DIAGNOSIS — K21.9 GASTROESOPHAGEAL REFLUX DISEASE, UNSPECIFIED WHETHER ESOPHAGITIS PRESENT: ICD-10-CM

## 2023-10-16 PROCEDURE — G8417 CALC BMI ABV UP PARAM F/U: HCPCS | Performed by: FAMILY MEDICINE

## 2023-10-16 PROCEDURE — 1090F PRES/ABSN URINE INCON ASSESS: CPT | Performed by: FAMILY MEDICINE

## 2023-10-16 PROCEDURE — 99213 OFFICE O/P EST LOW 20 MIN: CPT | Performed by: FAMILY MEDICINE

## 2023-10-16 PROCEDURE — 1123F ACP DISCUSS/DSCN MKR DOCD: CPT | Performed by: FAMILY MEDICINE

## 2023-10-16 PROCEDURE — G8399 PT W/DXA RESULTS DOCUMENT: HCPCS | Performed by: FAMILY MEDICINE

## 2023-10-16 PROCEDURE — 3017F COLORECTAL CA SCREEN DOC REV: CPT | Performed by: FAMILY MEDICINE

## 2023-10-16 PROCEDURE — G8427 DOCREV CUR MEDS BY ELIG CLIN: HCPCS | Performed by: FAMILY MEDICINE

## 2023-10-16 PROCEDURE — 1036F TOBACCO NON-USER: CPT | Performed by: FAMILY MEDICINE

## 2023-10-16 ASSESSMENT — ENCOUNTER SYMPTOMS
CONSTIPATION: 0
CHEST TIGHTNESS: 0
NAUSEA: 0
DIARRHEA: 0
BACK PAIN: 1
COUGH: 0
ABDOMINAL PAIN: 0
VOMITING: 0
SHORTNESS OF BREATH: 0
EYES NEGATIVE: 1

## 2023-10-16 NOTE — PROGRESS NOTES
2/16/2024), or if symptoms worsen or fail to improve, for HTN. Discussed use, benefit, and side effects of prescribed medications. All patient questions answered. Pt voiced understanding. Instructed to continue current medications,diet and exercise. Patient agreed with treatment plan. Allergies, Problem List, Medications, Medical History, Family History, Surgical History and Tobacco History reviewed by provider.

## 2023-11-02 DIAGNOSIS — I10 ESSENTIAL HYPERTENSION: ICD-10-CM

## 2023-11-02 NOTE — TELEPHONE ENCOUNTER
The pharmacy is  requesting a refill of the below medication which has been pended for you:     Requested Prescriptions     Pending Prescriptions Disp Refills    amLODIPine (NORVASC) 5 MG tablet [Pharmacy Med Name: amLODIPine Besylate 5 MG Oral Tablet] 90 tablet 1     Sig: TAKE 1 TABLET BY MOUTH DAILY       Last Appointment Date: 10/16/2023  Next Appointment Date: 2/19/2024    Allergies   Allergen Reactions    Minocycline Swelling     Other reaction(s): [id number]    Sulfa Antibiotics Swelling     Other reaction(s): swelling    Bactrim Diarrhea    Erythromycin Diarrhea    Hydrocodone-Acetaminophen Other (See Comments)     Headache  Other reaction(s): bad headaches  Other reaction(s): 18722204    Macrobid [Nitrofurantoin] Other (See Comments)     She got a HA, upset stomach and decreased appetite.      Nortriptyline Itching     Other reaction(s): [id number]    Vicodin [Hydrocodone-Acetaminophen] Other (See Comments)     Headache.  severe

## 2023-11-03 RX ORDER — AMLODIPINE BESYLATE 5 MG/1
5 TABLET ORAL DAILY
Qty: 90 TABLET | Refills: 1 | Status: SHIPPED | OUTPATIENT
Start: 2023-11-03

## 2023-11-05 NOTE — TELEPHONE ENCOUNTER
Jennifer Aguero informed by Phone. Lab ordered and faxed to [address] [facility] [address] on [facility]. 38-year-old female with PMH fibroids presents with hesitancy/hematuria which began today.  No dysuria, vaginal bleeding, chest pain, shortness of breath, palpitations, abdominal pain, back pain, HA, fall, trauma.  She also relates that for the last few days she has had some bilateral leg tingling sensation, which has happened to her intermittently over the years--but she has never sought treatment for it.

## 2023-11-14 ENCOUNTER — TELEPHONE (OUTPATIENT)
Dept: FAMILY MEDICINE CLINIC | Age: 73
End: 2023-11-14

## 2023-11-14 NOTE — TELEPHONE ENCOUNTER
Melida with Dr. Wei Parkinson office called asking for pre-op clearance. Pt will be having a left total shoulder surgery on Jan 9th @ [facility]. Dr. Katina Lopez to order pre-op testing.      Dm Batch may be reached at [phone] [phone]

## 2023-11-16 NOTE — TELEPHONE ENCOUNTER
Called Devon Begum to schedule. She stated that she was cleared by Dr. Sully Conde and thought she didn't need one from her PCP. She is going to call Melida @ Dr. Jacqueline Rosales office to clarify. If she is needing one from us also she is going to call us back to schedule.

## 2023-11-17 NOTE — TELEPHONE ENCOUNTER
Pt called back and said she was told by [facility] that she does need cleared by Dr Gary Gaona. I scheduled her for 1/3/24.

## 2024-01-03 ENCOUNTER — OFFICE VISIT (OUTPATIENT)
Dept: FAMILY MEDICINE CLINIC | Age: 74
End: 2024-01-03

## 2024-01-03 VITALS
DIASTOLIC BLOOD PRESSURE: 76 MMHG | SYSTOLIC BLOOD PRESSURE: 132 MMHG | RESPIRATION RATE: 12 BRPM | BODY MASS INDEX: 28.77 KG/M2 | HEIGHT: 66 IN | HEART RATE: 80 BPM | WEIGHT: 179 LBS

## 2024-01-03 DIAGNOSIS — I25.10 CORONARY ARTERY DISEASE INVOLVING NATIVE CORONARY ARTERY OF NATIVE HEART WITHOUT ANGINA PECTORIS: ICD-10-CM

## 2024-01-03 DIAGNOSIS — M15.9 PRIMARY OSTEOARTHRITIS INVOLVING MULTIPLE JOINTS: ICD-10-CM

## 2024-01-03 DIAGNOSIS — K21.9 GASTROESOPHAGEAL REFLUX DISEASE, UNSPECIFIED WHETHER ESOPHAGITIS PRESENT: ICD-10-CM

## 2024-01-03 DIAGNOSIS — E83.42 HYPOMAGNESEMIA: ICD-10-CM

## 2024-01-03 DIAGNOSIS — I10 ESSENTIAL HYPERTENSION: Primary | ICD-10-CM

## 2024-01-03 PROBLEM — M15.0 PRIMARY OSTEOARTHRITIS INVOLVING MULTIPLE JOINTS: Status: ACTIVE | Noted: 2024-01-03

## 2024-01-03 LAB
ANION GAP SERPL CALCULATED.3IONS-SCNC: 8 MEQ/L (ref 7–16)
BUN BLDV-MCNC: 17 MG/DL (ref 8–23)
CALCIUM SERPL-MCNC: 10.5 MG/DL (ref 8.5–10.5)
CHLORIDE BLD-SCNC: 99 MEQ/L (ref 95–107)
CO2: 34 MEQ/L (ref 19–31)
CREAT SERPL-MCNC: 0.87 MG/DL (ref 0.6–1.3)
EGFR IF NONAFRICAN AMERICAN: 70 ML/MIN/1.73
GLUCOSE: 108 MG/DL (ref 70–99)
POTASSIUM SERPL-SCNC: 4.6 MEQ/L (ref 3.5–5.4)
SODIUM BLD-SCNC: 141 MEQ/L (ref 133–146)
VITAMIN D 25-HYDROXY: 56 NG/ML

## 2024-01-03 ASSESSMENT — ENCOUNTER SYMPTOMS
DIARRHEA: 0
EYES NEGATIVE: 1
COUGH: 0
ABDOMINAL PAIN: 0
CONSTIPATION: 0
VOMITING: 0
CHEST TIGHTNESS: 0
NAUSEA: 0
SHORTNESS OF BREATH: 0

## 2024-01-03 ASSESSMENT — PATIENT HEALTH QUESTIONNAIRE - PHQ9
4. FEELING TIRED OR HAVING LITTLE ENERGY: 0
6. FEELING BAD ABOUT YOURSELF - OR THAT YOU ARE A FAILURE OR HAVE LET YOURSELF OR YOUR FAMILY DOWN: 0
8. MOVING OR SPEAKING SO SLOWLY THAT OTHER PEOPLE COULD HAVE NOTICED. OR THE OPPOSITE, BEING SO FIGETY OR RESTLESS THAT YOU HAVE BEEN MOVING AROUND A LOT MORE THAN USUAL: 0
SUM OF ALL RESPONSES TO PHQ QUESTIONS 1-9: 0
SUM OF ALL RESPONSES TO PHQ QUESTIONS 1-9: 0
1. LITTLE INTEREST OR PLEASURE IN DOING THINGS: 0
3. TROUBLE FALLING OR STAYING ASLEEP: 0
SUM OF ALL RESPONSES TO PHQ9 QUESTIONS 1 & 2: 0
9. THOUGHTS THAT YOU WOULD BE BETTER OFF DEAD, OR OF HURTING YOURSELF: 0
SUM OF ALL RESPONSES TO PHQ QUESTIONS 1-9: 0
7. TROUBLE CONCENTRATING ON THINGS, SUCH AS READING THE NEWSPAPER OR WATCHING TELEVISION: 0
5. POOR APPETITE OR OVEREATING: 0
SUM OF ALL RESPONSES TO PHQ QUESTIONS 1-9: 0
10. IF YOU CHECKED OFF ANY PROBLEMS, HOW DIFFICULT HAVE THESE PROBLEMS MADE IT FOR YOU TO DO YOUR WORK, TAKE CARE OF THINGS AT HOME, OR GET ALONG WITH OTHER PEOPLE: 0
2. FEELING DOWN, DEPRESSED OR HOPELESS: 0

## 2024-01-03 NOTE — PROGRESS NOTES
Date: 1/3/2024    Rose Marie Friedman is a 73 y.o. female who presents today for:  Chief Complaint   Patient presents with    Pre-op Exam for a left reverse total shoulder replacement on 1/9/24 by Dr Bloom at [facility]              HPI:     HPI    has a current medication list which includes the following prescription(s): amlodipine, potassium chloride, atenolol-chlorthalidone, gabapentin, omeprazole, magnesium oxide, aspirin ec, vitamin d3, zinc gluconate, nitroglycerin, clindamycin, hydroxychloroquine, tizanidine, NONFORMULARY, multiple vitamin, calcium citrate, and vitamin c.    Allergies   Allergen Reactions    Minocycline Swelling     Other reaction(s): 4725027    Sulfa Antibiotics Swelling     Other reaction(s): swelling    Bactrim Diarrhea    Erythromycin Diarrhea    Hydrocodone-Acetaminophen Other (See Comments)     Headache  Other reaction(s): bad headaches  Other reaction(s): 32665197    Macrobid [Nitrofurantoin] Other (See Comments)     She got a HA, upset stomach and decreased appetite.     Nortriptyline Itching     Other reaction(s): [id number]    Vicodin [Hydrocodone-Acetaminophen] Other (See Comments)     Headache.  severe       Social History     Tobacco Use    Smoking status: Never     Passive exposure: Past    Smokeless tobacco: Never   Vaping Use    Vaping Use: Never used   Substance Use Topics    Alcohol use: No    Drug use: No       Past Medical History:   Diagnosis Date    CAD (coronary artery disease)     Cervical radiculopathy     Degenerative arthritis of cervical spine     Degenerative lumbar disc     Fibromyalgia     GERD (gastroesophageal reflux disease)     Hydronephrosis 02/11/2016    right kidney    Hyperlipidemia     Hypertension     Hypokalemia     Hypomagnesemia     Osteoarthritis     neck,hands    Polymyalgia rheumatica (HCC)     Rheumatoid arthritis (HCC)     Spinal stenosis     UPJ (ureteropelvic junction) obstruction 06/22/2002    Vitreous detachment of left eye 1996    Vitreous detachment

## 2024-01-05 LAB — PARATHYROID HORMONE INTACT: 40 PG/ML (ref 15–65)

## 2024-01-08 ENCOUNTER — OFFICE VISIT (OUTPATIENT)
Dept: NEPHROLOGY | Age: 74
End: 2024-01-08
Payer: MEDICARE

## 2024-01-08 ENCOUNTER — TELEPHONE (OUTPATIENT)
Dept: FAMILY MEDICINE CLINIC | Age: 74
End: 2024-01-08

## 2024-01-08 VITALS
OXYGEN SATURATION: 97 % | SYSTOLIC BLOOD PRESSURE: 150 MMHG | HEART RATE: 70 BPM | BODY MASS INDEX: 28.61 KG/M2 | WEIGHT: 178 LBS | DIASTOLIC BLOOD PRESSURE: 81 MMHG | HEIGHT: 66 IN

## 2024-01-08 DIAGNOSIS — E83.52 HYPERCALCEMIA: Primary | ICD-10-CM

## 2024-01-08 DIAGNOSIS — E83.42 HYPOMAGNESEMIA: ICD-10-CM

## 2024-01-08 DIAGNOSIS — E87.3 METABOLIC ALKALOSIS: ICD-10-CM

## 2024-01-08 DIAGNOSIS — I10 PRIMARY HYPERTENSION: ICD-10-CM

## 2024-01-08 PROCEDURE — G8484 FLU IMMUNIZE NO ADMIN: HCPCS | Performed by: INTERNAL MEDICINE

## 2024-01-08 PROCEDURE — 1036F TOBACCO NON-USER: CPT | Performed by: INTERNAL MEDICINE

## 2024-01-08 PROCEDURE — G8417 CALC BMI ABV UP PARAM F/U: HCPCS | Performed by: INTERNAL MEDICINE

## 2024-01-08 PROCEDURE — G8427 DOCREV CUR MEDS BY ELIG CLIN: HCPCS | Performed by: INTERNAL MEDICINE

## 2024-01-08 PROCEDURE — 3017F COLORECTAL CA SCREEN DOC REV: CPT | Performed by: INTERNAL MEDICINE

## 2024-01-08 PROCEDURE — 1123F ACP DISCUSS/DSCN MKR DOCD: CPT | Performed by: INTERNAL MEDICINE

## 2024-01-08 PROCEDURE — 3079F DIAST BP 80-89 MM HG: CPT | Performed by: INTERNAL MEDICINE

## 2024-01-08 PROCEDURE — 3077F SYST BP >= 140 MM HG: CPT | Performed by: INTERNAL MEDICINE

## 2024-01-08 PROCEDURE — 99213 OFFICE O/P EST LOW 20 MIN: CPT | Performed by: INTERNAL MEDICINE

## 2024-01-08 PROCEDURE — 1090F PRES/ABSN URINE INCON ASSESS: CPT | Performed by: INTERNAL MEDICINE

## 2024-01-08 PROCEDURE — G8399 PT W/DXA RESULTS DOCUMENT: HCPCS | Performed by: INTERNAL MEDICINE

## 2024-01-08 NOTE — PROGRESS NOTES
Renal Progress Note    Assessment and Plan:      Diagnosis Orders   1. Hypercalcemia  Basic Metabolic Panel      2. Hypomagnesemia        3. Primary hypertension        4. Metabolic alkalosis                  PLAN:  I discussed my thoughts with the patient.  She understood  Addressed all questions  Serum calcium is good at 10.5 mg/dL  The hypercalcemia is thought to be medication related for the most part.  She remains on calcium supplement and she is reluctant to stop it.  He has mild metabolic acidosis which may be diuretic related.  Medications reviewed  No changes  Return to clinic 12 months with labs          Patient Active Problem List   Diagnosis    Hypertension    Degenerative arthritis of cervical spine    Osteoarthrosis    Heart palpitations    Primary osteoarthritis of left hip    Gastroesophageal reflux disease    CAD (coronary artery disease)    Primary osteoarthritis involving multiple joints           Subjective:   Chief complaint:  Chief Complaint   Patient presents with    Follow-up     hypomagnesemia      HPI:This is a follow up visit for Rose Marie Friedman here today for return appointment.  I see her for hypocalcemia.  She was last in July 2023.  Doing well with no complaint.  She has good appetite.  She urinates well.  No hospitalizations.  No chest pain or shortness of breath.  She is scheduled to have right shoulder surgery done however.  The blood pressure is high here in the office today but is normal at home with systolic in the 130s according to her.    ROS:  Pertinent positives stated above in HPI. All other systems were reviewed and were negative.  Medications:     Current Outpatient Medications   Medication Sig Dispense Refill    amLODIPine (NORVASC) 5 MG tablet TAKE 1 TABLET BY MOUTH DAILY 90 tablet 1    potassium chloride (KLOR-CON M) 10 MEQ extended release tablet TAKE 1 TABLET BY MOUTH TWICE  DAILY 180 tablet 1    atenolol-chlorthalidone (TENORETIC) 50-25 MG per tablet TAKE 1 TABLET BY

## 2024-01-08 NOTE — TELEPHONE ENCOUNTER
Dr Merle Pérez ([phone] Ext [phone]) called asking for clearance to be faxed to - Fax [phone].  Faxed visit from 1/5/24.

## 2024-01-23 RX ORDER — OMEPRAZOLE 20 MG/1
20 CAPSULE, DELAYED RELEASE ORAL
Qty: 90 CAPSULE | Refills: 1 | Status: SHIPPED | OUTPATIENT
Start: 2024-01-23

## 2024-01-23 NOTE — TELEPHONE ENCOUNTER
Date of last visit:  1/3/2024  Date of next visit:  2/19/2024    Requested Prescriptions     Pending Prescriptions Disp Refills    omeprazole (PRILOSEC) 20 MG delayed release capsule [Pharmacy Med Name: Omeprazole 20 MG Oral Capsule Delayed Release] 90 capsule 1     Sig: TAKE 1 CAPSULE BY MOUTH DAILY

## 2024-01-25 ENCOUNTER — HOSPITAL ENCOUNTER (EMERGENCY)
Age: 74
Discharge: HOME | End: 2024-01-25
Payer: MEDICARE

## 2024-01-25 ENCOUNTER — APPOINTMENT (OUTPATIENT)
Dept: GENERAL RADIOLOGY | Age: 74
End: 2024-01-25
Payer: MEDICARE

## 2024-01-25 VITALS
OXYGEN SATURATION: 95 % | TEMPERATURE: 98.7 F | BODY MASS INDEX: 27.64 KG/M2 | HEART RATE: 74 BPM | HEIGHT: 66 IN | RESPIRATION RATE: 18 BRPM | SYSTOLIC BLOOD PRESSURE: 166 MMHG | DIASTOLIC BLOOD PRESSURE: 80 MMHG | WEIGHT: 172 LBS

## 2024-01-25 DIAGNOSIS — J20.9 ACUTE BRONCHITIS, UNSPECIFIED ORGANISM: Primary | ICD-10-CM

## 2024-01-25 PROCEDURE — 71046 X-RAY EXAM CHEST 2 VIEWS: CPT

## 2024-01-25 PROCEDURE — 99203 OFFICE O/P NEW LOW 30 MIN: CPT | Performed by: NURSE PRACTITIONER

## 2024-01-25 PROCEDURE — 99213 OFFICE O/P EST LOW 20 MIN: CPT

## 2024-01-25 RX ORDER — LORATADINE 10 MG
1 CAPSULE ORAL 2 TIMES DAILY PRN
Qty: 60 CAPSULE | Refills: 0 | Status: SHIPPED | OUTPATIENT
Start: 2024-01-25

## 2024-01-25 RX ORDER — AZITHROMYCIN 250 MG/1
TABLET, FILM COATED ORAL
Qty: 1 PACKET | Refills: 0 | Status: SHIPPED | OUTPATIENT
Start: 2024-01-25 | End: 2024-01-29

## 2024-01-25 RX ORDER — ALBUTEROL SULFATE 90 UG/1
2 AEROSOL, METERED RESPIRATORY (INHALATION) 4 TIMES DAILY PRN
Qty: 18 G | Refills: 0 | Status: SHIPPED | OUTPATIENT
Start: 2024-01-25

## 2024-01-25 RX ORDER — PREDNISONE 20 MG/1
20 TABLET ORAL 2 TIMES DAILY
Qty: 10 TABLET | Refills: 0 | Status: SHIPPED | OUTPATIENT
Start: 2024-01-25 | End: 2024-01-30

## 2024-01-25 ASSESSMENT — ENCOUNTER SYMPTOMS
SHORTNESS OF BREATH: 0
COUGH: 1
SORE THROAT: 1
NAUSEA: 0

## 2024-01-25 ASSESSMENT — PAIN - FUNCTIONAL ASSESSMENT: PAIN_FUNCTIONAL_ASSESSMENT: NONE - DENIES PAIN

## 2024-01-25 NOTE — ED TRIAGE NOTES
Pt c/o cough x 4 days. Pt states cough is productive, but she does not know the color of phlegm d/t swallowing it instead of spitting it out. Cough is congested and frequent. Pt denies fever or chills.

## 2024-01-25 NOTE — ED PROVIDER NOTES
[facility] URGENT CARE  Urgent Care Encounter       CHIEF COMPLAINT       Chief Complaint   Patient presents with    Cough       Nurses Notes reviewed and I agree except as noted in the HPI.  HISTORY OF PRESENT ILLNESS   Rose Marie Friedman is a 73 y.o. female who presents with new onset cough, congestion, body aches, and sore throat for the past 4 days.  She has not tried anything for treatment.  Mainly complains of persistent coughing.  Denies any shortness of breath or chest pain.  No known exposure to illness.      The history is provided by the patient.       REVIEW OF SYSTEMS     Review of Systems   Constitutional:  Positive for fatigue. Negative for fever.   HENT:  Positive for congestion and sore throat.    Respiratory:  Positive for cough. Negative for shortness of breath.    Cardiovascular:  Negative for chest pain.   Gastrointestinal:  Negative for nausea and vomiting.   Musculoskeletal:  Positive for myalgias.   Neurological:  Negative for headaches.       PAST MEDICAL HISTORY         Diagnosis Date    CAD (coronary artery disease)     Cervical radiculopathy     Degenerative arthritis of cervical spine     Degenerative lumbar disc     Fibromyalgia     GERD (gastroesophageal reflux disease)     Hydronephrosis 02/11/2016    right kidney    Hyperlipidemia     Hypertension     Hypokalemia     Hypomagnesemia     Osteoarthritis     neck,hands    Polymyalgia rheumatica (HCC)     Rheumatoid arthritis (HCC)     Spinal stenosis     UPJ (ureteropelvic junction) obstruction 06/22/2002    Vitreous detachment of left eye 1996    Vitreous detachment of right eye 2005       SURGICALHISTORY     Patient  has a past surgical history that includes shoulder surgery (Right, 1972); Dilation and curettage of uterus (1980); Tonsillectomy and adenoidectomy (1954); Lumbar disc surgery (05/2000); Tubal ligation (1983); Cervical disc surgery (01/18/2008); Colonoscopy (10/02 10/05 11/10); Hip arthroscopy (Right, 07/17/2014);  nightly    ZINC GLUCONATE 50 MG TABLET    Take 1 tablet by mouth daily       ALLERGIES     Patient is is allergic to minocycline, sulfa antibiotics, bactrim, erythromycin, hydrocodone-acetaminophen, macrobid [nitrofurantoin], nortriptyline, and vicodin [hydrocodone-acetaminophen].    Patients   Immunization History   Administered Date(s) Administered    Influenza Vaccine, unspecified formulation 10/10/2018    Influenza Virus Vaccine 11/09/2015    Influenza Whole 11/09/2015    Influenza, FLUARIX, FLULAVAL, FLUZONE (age 6 mo+) AND AFLURIA, (age 3 y+), PF, 0.5mL 10/05/2016    Influenza, FLUCELVAX, (age 6 mo+), MDCK, PF, 0.5mL 10/27/2017    Influenza, FLUZONE (age 65 y+), High Dose, 0.7mL 10/06/2020    Influenza, High Dose (Fluzone 65 yrs and older) 09/20/2018, 10/09/2018, 10/09/2019, 10/06/2020    Pneumococcal, PCV-13, PREVNAR 13, (age 6w+), IM, 0.5mL 10/05/2016    Pneumococcal, PPSV23, PNEUMOVAX 23, (age 2y+), SC/IM, 0.5mL 10/27/2017    TDaP, ADACEL (age 10y-64y), BOOSTRIX (age 10y+), IM, 0.5mL 12/02/2015    Td, unspecified formulation 06/06/2005    Zoster Recombinant (Shingrix) 10/06/2020, 12/14/2020       FAMILY HISTORY     Patient's family history includes Arthritis in her father; Breast Cancer in her maternal cousin, maternal cousin, and maternal cousin; Breast Cancer (age of onset: 68) in her maternal cousin; Breast Cancer (age of onset: 71) in her maternal cousin; Cancer in her father; Diabetes in her paternal grandmother; Heart Disease in her mother; Heart Disease (age of onset: 60) in her father; High Blood Pressure in her sister; High Cholesterol in her sister; Kidney Disease in her father; Other in her father; Stroke in her paternal grandmother.    SOCIAL HISTORY     Patient  reports that she has never smoked. She has been exposed to tobacco smoke. She has never used smokeless tobacco. She reports that she does not drink alcohol and does not use drugs.    PHYSICAL EXAM     ED TRIAGE VITALS  BP: (!) 166/80,

## 2024-02-17 DIAGNOSIS — I10 ESSENTIAL HYPERTENSION: ICD-10-CM

## 2024-02-19 ENCOUNTER — OFFICE VISIT (OUTPATIENT)
Dept: FAMILY MEDICINE CLINIC | Age: 74
End: 2024-02-19

## 2024-02-19 VITALS
RESPIRATION RATE: 16 BRPM | SYSTOLIC BLOOD PRESSURE: 138 MMHG | BODY MASS INDEX: 28.16 KG/M2 | HEIGHT: 66 IN | WEIGHT: 175.2 LBS | DIASTOLIC BLOOD PRESSURE: 86 MMHG | HEART RATE: 80 BPM

## 2024-02-19 DIAGNOSIS — K21.9 GASTROESOPHAGEAL REFLUX DISEASE, UNSPECIFIED WHETHER ESOPHAGITIS PRESENT: ICD-10-CM

## 2024-02-19 DIAGNOSIS — I10 ESSENTIAL HYPERTENSION: Primary | ICD-10-CM

## 2024-02-19 DIAGNOSIS — E83.42 HYPOMAGNESEMIA: ICD-10-CM

## 2024-02-19 PROCEDURE — G8417 CALC BMI ABV UP PARAM F/U: HCPCS | Performed by: FAMILY MEDICINE

## 2024-02-19 PROCEDURE — 99213 OFFICE O/P EST LOW 20 MIN: CPT | Performed by: FAMILY MEDICINE

## 2024-02-19 PROCEDURE — G8399 PT W/DXA RESULTS DOCUMENT: HCPCS | Performed by: FAMILY MEDICINE

## 2024-02-19 PROCEDURE — 3017F COLORECTAL CA SCREEN DOC REV: CPT | Performed by: FAMILY MEDICINE

## 2024-02-19 PROCEDURE — 1090F PRES/ABSN URINE INCON ASSESS: CPT | Performed by: FAMILY MEDICINE

## 2024-02-19 PROCEDURE — 1036F TOBACCO NON-USER: CPT | Performed by: FAMILY MEDICINE

## 2024-02-19 PROCEDURE — 1123F ACP DISCUSS/DSCN MKR DOCD: CPT | Performed by: FAMILY MEDICINE

## 2024-02-19 PROCEDURE — G8427 DOCREV CUR MEDS BY ELIG CLIN: HCPCS | Performed by: FAMILY MEDICINE

## 2024-02-19 RX ORDER — ATENOLOL AND CHLORTHALIDONE TABLET 50; 25 MG/1; MG/1
TABLET ORAL
Qty: 180 TABLET | Refills: 1 | Status: SHIPPED | OUTPATIENT
Start: 2024-02-19

## 2024-02-19 SDOH — ECONOMIC STABILITY: FOOD INSECURITY: WITHIN THE PAST 12 MONTHS, THE FOOD YOU BOUGHT JUST DIDN'T LAST AND YOU DIDN'T HAVE MONEY TO GET MORE.: NEVER TRUE

## 2024-02-19 SDOH — ECONOMIC STABILITY: INCOME INSECURITY: HOW HARD IS IT FOR YOU TO PAY FOR THE VERY BASICS LIKE FOOD, HOUSING, MEDICAL CARE, AND HEATING?: NOT HARD AT ALL

## 2024-02-19 SDOH — ECONOMIC STABILITY: FOOD INSECURITY: WITHIN THE PAST 12 MONTHS, YOU WORRIED THAT YOUR FOOD WOULD RUN OUT BEFORE YOU GOT MONEY TO BUY MORE.: NEVER TRUE

## 2024-02-19 ASSESSMENT — ENCOUNTER SYMPTOMS
CONSTIPATION: 0
VOMITING: 0
EYES NEGATIVE: 1
ABDOMINAL PAIN: 0
COUGH: 0
WATER BRASH: 0
SORE THROAT: 0
NAUSEA: 0
CHEST TIGHTNESS: 0
WHEEZING: 0
DIARRHEA: 0
SHORTNESS OF BREATH: 0

## 2024-02-19 NOTE — PROGRESS NOTES
Date: 2/19/2024    Rose Marie Friedman is a 73 y.o. female who presents today for:  Chief Complaint   Patient presents with    Hypertension    Gastroesophageal Reflux       HPI:     Hypertension  This is a chronic problem. The current episode started more than 1 year ago. The problem is unchanged. The problem is controlled. Pertinent negatives include no chest pain, headaches, palpitations or shortness of breath. Risk factors for coronary artery disease include post-menopausal state. Past treatments include calcium channel blockers, beta blockers and diuretics. The current treatment provides significant improvement. There are no compliance problems.    Gastroesophageal Reflux  She reports no abdominal pain, no chest pain, no coughing, no nausea, no sore throat, no tooth decay, no water brash or no wheezing. This is a chronic problem. The current episode started more than 1 year ago. The problem occurs rarely. The problem has been unchanged. Pertinent negatives include no fatigue, muscle weakness or orthopnea. She has tried a PPI for the symptoms. The treatment provided significant relief.       has a current medication list which includes the following prescription(s): coricidin hbp congestion/cough, albuterol sulfate hfa, omeprazole, amlodipine, potassium chloride, atenolol-chlorthalidone, gabapentin, magnesium oxide, aspirin ec, vitamin d3, zinc gluconate, nitroglycerin, clindamycin, hydroxychloroquine, tizanidine, NONFORMULARY, multiple vitamin, calcium citrate, and vitamin c.    Allergies   Allergen Reactions    Minocycline Swelling     Other reaction(s): 7021542    Sulfa Antibiotics Swelling     Other reaction(s): swelling    Bactrim Diarrhea    Erythromycin Diarrhea    Hydrocodone-Acetaminophen Other (See Comments)     Headache  Other reaction(s): bad headaches  Other reaction(s): 82949342    Macrobid [Nitrofurantoin] Other (See Comments)     She got a HA, upset stomach and decreased appetite.     Nortriptyline

## 2024-02-19 NOTE — TELEPHONE ENCOUNTER
The pharmacy is  requesting a refill of the below medication which has been pended for you:     Requested Prescriptions     Pending Prescriptions Disp Refills    atenolol-chlorthalidone (TENORETIC) 50-25 MG per tablet [Pharmacy Med Name: Atenolol-Chlorthalidone 50-25 MG Oral Tablet] 180 tablet 1     Sig: TAKE 1 TABLET BY MOUTH TWICE  DAILY       Last Appointment Date: 1/3/2024  Next Appointment Date: 2/19/2024    Allergies   Allergen Reactions    Minocycline Swelling     Other reaction(s): 6269816    Sulfa Antibiotics Swelling     Other reaction(s): swelling    Bactrim Diarrhea    Erythromycin Diarrhea    Hydrocodone-Acetaminophen Other (See Comments)     Headache  Other reaction(s): bad headaches  Other reaction(s): 37451937    Macrobid [Nitrofurantoin] Other (See Comments)     She got a HA, upset stomach and decreased appetite.     Nortriptyline Itching     Other reaction(s): 35443023    Vicodin [Hydrocodone-Acetaminophen] Other (See Comments)     Headache.  severe

## 2024-02-27 ENCOUNTER — TELEPHONE (OUTPATIENT)
Dept: FAMILY MEDICINE CLINIC | Age: 74
End: 2024-02-27

## 2024-02-27 NOTE — TELEPHONE ENCOUNTER
[facility] called. They recommend pt start a high intensity statin due to ASCVD. Suggest that dr discuss with pt at next appt

## 2024-03-11 DIAGNOSIS — E87.6 HYPOKALEMIA: ICD-10-CM

## 2024-03-12 RX ORDER — POTASSIUM CHLORIDE 750 MG/1
TABLET, EXTENDED RELEASE ORAL
Qty: 180 TABLET | Refills: 1 | Status: SHIPPED | OUTPATIENT
Start: 2024-03-12

## 2024-03-12 NOTE — TELEPHONE ENCOUNTER
The pharmacy is  requesting a refill of the below medication which has been pended for you:     Requested Prescriptions     Pending Prescriptions Disp Refills    potassium chloride (KLOR-CON M) 10 MEQ extended release tablet [Pharmacy Med Name: Potassium Chloride Amber ER 10 MEQ Oral Tablet Extended Release] 180 tablet 3     Sig: TAKE 1 TABLET BY MOUTH TWICE  DAILY       Last Appointment Date: 2/19/2024  Next Appointment Date: 6/19/2024    Allergies   Allergen Reactions    Minocycline Swelling     Other reaction(s): 9439233    Sulfa Antibiotics Swelling     Other reaction(s): swelling    Bactrim Diarrhea    Erythromycin Diarrhea    Hydrocodone-Acetaminophen Other (See Comments)     Headache  Other reaction(s): bad headaches  Other reaction(s): 56231151    Macrobid [Nitrofurantoin] Other (See Comments)     She got a HA, upset stomach and decreased appetite.     Nortriptyline Itching     Other reaction(s): 90722743    Vicodin [Hydrocodone-Acetaminophen] Other (See Comments)     Headache.  severe

## 2024-03-25 DIAGNOSIS — I10 ESSENTIAL HYPERTENSION: ICD-10-CM

## 2024-03-26 RX ORDER — AMLODIPINE BESYLATE 5 MG/1
5 TABLET ORAL DAILY
Qty: 90 TABLET | Refills: 0 | Status: SHIPPED | OUTPATIENT
Start: 2024-03-26

## 2024-03-26 NOTE — TELEPHONE ENCOUNTER
Date of last visit:  2/19/2024  Date of next visit:  6/19/2024    Requested Prescriptions     Pending Prescriptions Disp Refills    amLODIPine (NORVASC) 5 MG tablet [Pharmacy Med Name: amLODIPine Besylate 5 MG Oral Tablet] 90 tablet 0     Sig: TAKE 1 TABLET BY MOUTH DAILY

## 2024-05-16 ENCOUNTER — HOSPITAL ENCOUNTER (OUTPATIENT)
Dept: WOMENS IMAGING | Age: 74
Discharge: HOME | End: 2024-05-16
Payer: MEDICARE

## 2024-05-16 DIAGNOSIS — Z12.31 VISIT FOR SCREENING MAMMOGRAM: ICD-10-CM

## 2024-05-16 PROCEDURE — 77063 BREAST TOMOSYNTHESIS BI: CPT

## 2024-05-16 PROCEDURE — G0279 TOMOSYNTHESIS, MAMMO: HCPCS

## 2024-05-24 ENCOUNTER — TELEPHONE (OUTPATIENT)
Dept: FAMILY MEDICINE CLINIC | Age: 74
End: 2024-05-24

## 2024-05-24 NOTE — TELEPHONE ENCOUNTER
----- Message from Marina Campos MD sent at 5/24/2024  4:07 PM EDT -----  Please verify if she is following with urology?

## 2024-05-27 DIAGNOSIS — I10 ESSENTIAL HYPERTENSION: ICD-10-CM

## 2024-05-28 RX ORDER — AMLODIPINE BESYLATE 5 MG/1
5 TABLET ORAL DAILY
Qty: 90 TABLET | Refills: 0 | Status: SHIPPED | OUTPATIENT
Start: 2024-05-28

## 2024-05-28 NOTE — TELEPHONE ENCOUNTER
Date of last visit:  2/19/24  Date of next visit:  6/19/2024    Requested Prescriptions     Pending Prescriptions Disp Refills    amLODIPine (NORVASC) 5 MG tablet [Pharmacy Med Name: amLODIPine Besylate 5 MG Oral Tablet] 90 tablet 3     Sig: TAKE 1 TABLET BY MOUTH DAILY

## 2024-06-19 ENCOUNTER — OFFICE VISIT (OUTPATIENT)
Dept: FAMILY MEDICINE CLINIC | Age: 74
End: 2024-06-19

## 2024-06-19 VITALS
HEART RATE: 76 BPM | WEIGHT: 179.4 LBS | SYSTOLIC BLOOD PRESSURE: 130 MMHG | RESPIRATION RATE: 12 BRPM | BODY MASS INDEX: 28.83 KG/M2 | DIASTOLIC BLOOD PRESSURE: 84 MMHG | HEIGHT: 66 IN

## 2024-06-19 DIAGNOSIS — I10 ESSENTIAL HYPERTENSION: ICD-10-CM

## 2024-06-19 DIAGNOSIS — K21.9 GASTROESOPHAGEAL REFLUX DISEASE, UNSPECIFIED WHETHER ESOPHAGITIS PRESENT: ICD-10-CM

## 2024-06-19 DIAGNOSIS — Z00.00 MEDICARE ANNUAL WELLNESS VISIT, SUBSEQUENT: Primary | ICD-10-CM

## 2024-06-19 DIAGNOSIS — M15.9 PRIMARY OSTEOARTHRITIS INVOLVING MULTIPLE JOINTS: ICD-10-CM

## 2024-06-19 DIAGNOSIS — E83.42 HYPOMAGNESEMIA: ICD-10-CM

## 2024-06-19 RX ORDER — OMEPRAZOLE 20 MG/1
20 CAPSULE, DELAYED RELEASE ORAL
Qty: 90 CAPSULE | Refills: 1 | Status: SHIPPED | OUTPATIENT
Start: 2024-06-19

## 2024-06-19 ASSESSMENT — PATIENT HEALTH QUESTIONNAIRE - PHQ9
1. LITTLE INTEREST OR PLEASURE IN DOING THINGS: SEVERAL DAYS
2. FEELING DOWN, DEPRESSED OR HOPELESS: NOT AT ALL
SUM OF ALL RESPONSES TO PHQ QUESTIONS 1-9: 1
SUM OF ALL RESPONSES TO PHQ QUESTIONS 1-9: 1
SUM OF ALL RESPONSES TO PHQ9 QUESTIONS 1 & 2: 1
SUM OF ALL RESPONSES TO PHQ QUESTIONS 1-9: 1
SUM OF ALL RESPONSES TO PHQ QUESTIONS 1-9: 1

## 2024-06-19 ASSESSMENT — ENCOUNTER SYMPTOMS
DIARRHEA: 0
VOMITING: 0
HEARTBURN: 0
CHEST TIGHTNESS: 0
CONSTIPATION: 0
CHOKING: 0
ABDOMINAL PAIN: 0
NAUSEA: 0
BLURRED VISION: 0
SHORTNESS OF BREATH: 0
HOARSE VOICE: 0
WATER BRASH: 0
BACK PAIN: 1
ORTHOPNEA: 0
COUGH: 0
WHEEZING: 0
EYES NEGATIVE: 1

## 2024-06-19 ASSESSMENT — LIFESTYLE VARIABLES
HOW OFTEN DO YOU HAVE A DRINK CONTAINING ALCOHOL: NEVER
HOW MANY STANDARD DRINKS CONTAINING ALCOHOL DO YOU HAVE ON A TYPICAL DAY: PATIENT DOES NOT DRINK

## 2024-06-19 NOTE — TELEPHONE ENCOUNTER
The pharmacy is  requesting a refill of the below medication which has been pended for you:     Requested Prescriptions     Pending Prescriptions Disp Refills    omeprazole (PRILOSEC) 20 MG delayed release capsule [Pharmacy Med Name: Omeprazole 20 MG Oral Capsule Delayed Release] 90 capsule 1     Sig: TAKE 1 CAPSULE BY MOUTH DAILY       Last Appointment Date: 2/19/2024  Next Appointment Date: 6/19/2024    Allergies   Allergen Reactions    Minocycline Swelling     Other reaction(s): 9628656    Sulfa Antibiotics Swelling     Other reaction(s): swelling    Bactrim Diarrhea    Erythromycin Diarrhea    Hydrocodone-Acetaminophen Other (See Comments)     Headache  Other reaction(s): bad headaches  Other reaction(s): 11269245    Macrobid [Nitrofurantoin] Other (See Comments)     She got a HA, upset stomach and decreased appetite.     Nortriptyline Itching     Other reaction(s): 08161198    Vicodin [Hydrocodone-Acetaminophen] Other (See Comments)     Headache.  severe

## 2024-06-19 NOTE — PATIENT INSTRUCTIONS
other health problems such as diabetes, high blood pressure, and high cholesterol. If you think you may have a problem with alcohol or drug use, talk to your doctor.   Medicines    Take your medicines exactly as prescribed. Call your doctor if you think you are having a problem with your medicine.     If your doctor recommends aspirin, take the amount directed each day. Make sure you take aspirin and not another kind of pain reliever, such as acetaminophen (Tylenol).   When should you call for help?   Call 911 if you have symptoms of a heart attack. These may include:    Chest pain or pressure, or a strange feeling in the chest.     Sweating.     Shortness of breath.     Pain, pressure, or a strange feeling in the back, neck, jaw, or upper belly or in one or both shoulders or arms.     Lightheadedness or sudden weakness.     A fast or irregular heartbeat.   After you call 911, the  may tell you to chew 1 adult-strength or 2 to 4 low-dose aspirin. Wait for an ambulance. Do not try to drive yourself.  Watch closely for changes in your health, and be sure to contact your doctor if you have any problems.  Where can you learn more?  Go to https://www.[employer].net/patientEd and enter F075 to learn more about \"A Healthy Heart: Care Instructions.\"  Current as of: June 24, 2023  Content Version: 14.1  © 9142-5661 [employer].   Care instructions adapted under license by [employer]. If you have questions about a medical condition or this instruction, always ask your healthcare professional. [employer] disclaims any warranty or liability for your use of this information.      Personalized Preventive Plan for Rose Marie Friedman - 6/19/2024  Medicare offers a range of preventive health benefits. Some of the tests and screenings are paid in full while other may be subject to a deductible, co-insurance, and/or copay.    Some of these benefits include a comprehensive review of your medical history

## 2024-06-19 NOTE — PROGRESS NOTES
Date: 6/19/2024    Rose Marie Friedman is a 73 y.o. female who presents today for:  Chief Complaint   Patient presents with    Medicare AWV    Hypertension stable    Gastroesophageal Reflux  She follows with Leia, Sonali (rheumatology)  Milind ( cardiology), Pedrito (nephrology), Tiffanie (podiatrist) Amairani (GYN), Parth ( dermatology), Dinorah ( urology), Allyson (optometrist) and Timothy ( pain management).        HPI:     Hypertension  This is a chronic problem. The current episode started more than 1 year ago. The problem is unchanged. The problem is controlled. Pertinent negatives include no blurred vision, chest pain, headaches, neck pain, orthopnea, palpitations, peripheral edema, PND or shortness of breath. Risk factors for coronary artery disease include post-menopausal state. Past treatments include calcium channel blockers, beta blockers and diuretics. The current treatment provides significant improvement. Compliance problems: she states that she has not been able to exercise (walking) because she is having back pain.  Hypertensive end-organ damage includes CAD/MI.   Gastroesophageal Reflux  She reports no abdominal pain, no chest pain, no choking, no coughing, no heartburn, no hoarse voice, no nausea, no water brash or no wheezing. This is a chronic problem. The current episode started more than 1 year ago. The problem occurs rarely. The problem has been unchanged. Pertinent negatives include no fatigue. She has tried a PPI for the symptoms. The treatment provided significant relief.       has a current medication list which includes the following prescription(s): amlodipine, potassium chloride, atenolol-chlorthalidone, omeprazole, magnesium oxide, aspirin ec, vitamin d3, zinc gluconate, nitroglycerin, clindamycin, tizanidine, NONFORMULARY, multiple vitamin, calcium citrate, and vitamin c.    Allergies   Allergen Reactions    Minocycline Swelling     Other reaction(s): 8753838    Sulfa Antibiotics Swelling

## 2024-07-11 DIAGNOSIS — I10 ESSENTIAL HYPERTENSION: ICD-10-CM

## 2024-07-11 NOTE — TELEPHONE ENCOUNTER
The pharmacy is  requesting a refill of the below medication which has been pended for you:     Requested Prescriptions     Pending Prescriptions Disp Refills    atenolol-chlorthalidone (TENORETIC) 50-25 MG per tablet [Pharmacy Med Name: Atenolol-Chlorthalidone 50-25 MG Oral Tablet] 180 tablet 1     Sig: TAKE 1 TABLET BY MOUTH TWICE  DAILY       Last Appointment Date: 6/19/2024  Next Appointment Date: 10/18/2024    Allergies   Allergen Reactions    Minocycline Swelling     Other reaction(s): 6042785    Sulfa Antibiotics Swelling     Other reaction(s): swelling    Bactrim Diarrhea    Erythromycin Diarrhea    Hydrocodone-Acetaminophen Other (See Comments)     Headache  Other reaction(s): bad headaches  Other reaction(s): 35024923    Macrobid [Nitrofurantoin] Other (See Comments)     She got a HA, upset stomach and decreased appetite.     Nortriptyline Itching     Other reaction(s): 91941694    Vicodin [Hydrocodone-Acetaminophen] Other (See Comments)     Headache.  severe

## 2024-07-12 RX ORDER — ATENOLOL AND CHLORTHALIDONE TABLET 50; 25 MG/1; MG/1
TABLET ORAL
Qty: 180 TABLET | Refills: 1 | Status: SHIPPED | OUTPATIENT
Start: 2024-07-12

## 2024-07-28 ENCOUNTER — HOSPITAL ENCOUNTER (INPATIENT)
Age: 74
LOS: 2 days | Discharge: HOME | End: 2024-07-30
Attending: EMERGENCY MEDICINE
Payer: MEDICARE

## 2024-07-28 ENCOUNTER — APPOINTMENT (OUTPATIENT)
Dept: GENERAL RADIOLOGY | Age: 74
End: 2024-07-28
Payer: MEDICARE

## 2024-07-28 DIAGNOSIS — I25.10 CORONARY ARTERY DISEASE, UNSPECIFIED VESSEL OR LESION TYPE, UNSPECIFIED WHETHER ANGINA PRESENT, UNSPECIFIED WHETHER NATIVE OR TRANSPLANTED HEART: ICD-10-CM

## 2024-07-28 DIAGNOSIS — I20.0 UNSTABLE ANGINA (HCC): ICD-10-CM

## 2024-07-28 DIAGNOSIS — R07.9 CHEST PAIN, UNSPECIFIED TYPE: Primary | ICD-10-CM

## 2024-07-28 LAB
ALBUMIN SERPL BCG-MCNC: 4.4 G/DL (ref 3.5–5.1)
ALP SERPL-CCNC: 75 U/L (ref 38–126)
ALT SERPL W/O P-5'-P-CCNC: 16 U/L (ref 11–66)
ANION GAP SERPL CALC-SCNC: 14 MEQ/L (ref 8–16)
AST SERPL-CCNC: 22 U/L (ref 5–40)
BASOPHILS ABSOLUTE: 0.1 THOU/MM3 (ref 0–0.1)
BASOPHILS NFR BLD AUTO: 0.8 %
BILIRUB CONJ SERPL-MCNC: < 0.1 MG/DL (ref 0.1–13.8)
BILIRUB SERPL-MCNC: 0.3 MG/DL (ref 0.3–1.2)
BUN SERPL-MCNC: 19 MG/DL (ref 7–22)
CALCIUM SERPL-MCNC: 10.4 MG/DL (ref 8.5–10.5)
CHLORIDE SERPL-SCNC: 99 MEQ/L (ref 98–111)
CO2 SERPL-SCNC: 26 MEQ/L (ref 23–33)
CREAT SERPL-MCNC: 0.8 MG/DL (ref 0.4–1.2)
DEPRECATED RDW RBC AUTO: 44 FL (ref 35–45)
EOSINOPHIL NFR BLD AUTO: 3 %
EOSINOPHILS ABSOLUTE: 0.3 THOU/MM3 (ref 0–0.4)
ERYTHROCYTE [DISTWIDTH] IN BLOOD BY AUTOMATED COUNT: 12.8 % (ref 11.5–14.5)
GFR SERPL CREATININE-BSD FRML MDRD: 77 ML/MIN/1.73M2
GLUCOSE SERPL-MCNC: 126 MG/DL (ref 70–108)
HCT VFR BLD AUTO: 41.3 % (ref 37–47)
HGB BLD-MCNC: 13.5 GM/DL (ref 12–16)
IMM GRANULOCYTES # BLD AUTO: 0.04 THOU/MM3 (ref 0–0.07)
IMM GRANULOCYTES NFR BLD AUTO: 0.4 %
LIPASE SERPL-CCNC: 47.4 U/L (ref 5.6–51.3)
LYMPHOCYTES ABSOLUTE: 2.8 THOU/MM3 (ref 1–4.8)
LYMPHOCYTES NFR BLD AUTO: 31.5 %
MAGNESIUM SERPL-MCNC: 1.7 MG/DL (ref 1.6–2.4)
MCH RBC QN AUTO: 30.7 PG (ref 26–33)
MCHC RBC AUTO-ENTMCNC: 32.7 GM/DL (ref 32.2–35.5)
MCV RBC AUTO: 93.9 FL (ref 81–99)
MONOCYTES ABSOLUTE: 0.9 THOU/MM3 (ref 0.4–1.3)
MONOCYTES NFR BLD AUTO: 9.9 %
NEUTROPHILS ABSOLUTE: 4.9 THOU/MM3 (ref 1.8–7.7)
NEUTROPHILS NFR BLD AUTO: 54.4 %
NRBC BLD AUTO-RTO: 0 /100 WBC
NT-PROBNP SERPL IA-MCNC: 200.5 PG/ML (ref 0–124)
OSMOLALITY SERPL CALC.SUM OF ELEC: 281.3 MOSMOL/KG (ref 275–300)
PLATELET # BLD AUTO: 253 THOU/MM3 (ref 130–400)
PMV BLD AUTO: 9 FL (ref 9.4–12.4)
POTASSIUM SERPL-SCNC: 3.7 MEQ/L (ref 3.5–5.2)
PROT SERPL-MCNC: 7.3 G/DL (ref 6.1–8)
RBC # BLD AUTO: 4.4 MILL/MM3 (ref 4.2–5.4)
SODIUM SERPL-SCNC: 139 MEQ/L (ref 135–145)
TROPONIN, HIGH SENSITIVITY: 13 NG/L (ref 0–12)
TROPONIN, HIGH SENSITIVITY: 15 NG/L (ref 0–12)
WBC # BLD AUTO: 9 THOU/MM3 (ref 4.8–10.8)

## 2024-07-28 PROCEDURE — 84484 ASSAY OF TROPONIN QUANT: CPT

## 2024-07-28 PROCEDURE — 80053 COMPREHEN METABOLIC PANEL: CPT

## 2024-07-28 PROCEDURE — 83880 ASSAY OF NATRIURETIC PEPTIDE: CPT

## 2024-07-28 PROCEDURE — 99223 1ST HOSP IP/OBS HIGH 75: CPT | Performed by: PHYSICIAN ASSISTANT

## 2024-07-28 PROCEDURE — 99285 EMERGENCY DEPT VISIT HI MDM: CPT

## 2024-07-28 PROCEDURE — 6370000000 HC RX 637 (ALT 250 FOR IP)

## 2024-07-28 PROCEDURE — 36415 COLL VENOUS BLD VENIPUNCTURE: CPT

## 2024-07-28 PROCEDURE — 85520 HEPARIN ASSAY: CPT

## 2024-07-28 PROCEDURE — 85730 THROMBOPLASTIN TIME PARTIAL: CPT

## 2024-07-28 PROCEDURE — 93005 ELECTROCARDIOGRAM TRACING: CPT | Performed by: EMERGENCY MEDICINE

## 2024-07-28 PROCEDURE — 85025 COMPLETE CBC W/AUTO DIFF WBC: CPT

## 2024-07-28 PROCEDURE — 82248 BILIRUBIN DIRECT: CPT

## 2024-07-28 PROCEDURE — 83690 ASSAY OF LIPASE: CPT

## 2024-07-28 PROCEDURE — 2140000000 HC CCU INTERMEDIATE R&B

## 2024-07-28 PROCEDURE — 71045 X-RAY EXAM CHEST 1 VIEW: CPT

## 2024-07-28 PROCEDURE — 85610 PROTHROMBIN TIME: CPT

## 2024-07-28 PROCEDURE — 83735 ASSAY OF MAGNESIUM: CPT

## 2024-07-28 RX ORDER — ATENOLOL AND CHLORTHALIDONE TABLET 50; 25 MG/1; MG/1
1 TABLET ORAL 2 TIMES DAILY
Status: DISCONTINUED | OUTPATIENT
Start: 2024-07-29 | End: 2024-07-30 | Stop reason: HOSPADM

## 2024-07-28 RX ORDER — OMEPRAZOLE 20 MG/1
20 CAPSULE, DELAYED RELEASE ORAL
Status: DISCONTINUED | OUTPATIENT
Start: 2024-07-29 | End: 2024-07-30 | Stop reason: HOSPADM

## 2024-07-28 RX ORDER — MAGNESIUM SULFATE IN WATER 40 MG/ML
2000 INJECTION, SOLUTION INTRAVENOUS PRN
Status: DISCONTINUED | OUTPATIENT
Start: 2024-07-28 | End: 2024-07-30 | Stop reason: HOSPADM

## 2024-07-28 RX ORDER — POTASSIUM CHLORIDE 750 MG/1
40 TABLET, FILM COATED, EXTENDED RELEASE ORAL PRN
Status: DISCONTINUED | OUTPATIENT
Start: 2024-07-28 | End: 2024-07-30 | Stop reason: HOSPADM

## 2024-07-28 RX ORDER — ACETAMINOPHEN 650 MG/1
650 SUPPOSITORY RECTAL EVERY 6 HOURS PRN
Status: DISCONTINUED | OUTPATIENT
Start: 2024-07-28 | End: 2024-07-30 | Stop reason: HOSPADM

## 2024-07-28 RX ORDER — HEPARIN SODIUM 1000 [USP'U]/ML
2000 INJECTION, SOLUTION INTRAVENOUS; SUBCUTANEOUS PRN
Status: DISCONTINUED | OUTPATIENT
Start: 2024-07-28 | End: 2024-07-30 | Stop reason: ALTCHOICE

## 2024-07-28 RX ORDER — HEPARIN SODIUM 10000 [USP'U]/100ML
5-30 INJECTION, SOLUTION INTRAVENOUS CONTINUOUS
Status: DISCONTINUED | OUTPATIENT
Start: 2024-07-28 | End: 2024-07-30 | Stop reason: ALTCHOICE

## 2024-07-28 RX ORDER — SODIUM CHLORIDE 0.9 % (FLUSH) 0.9 %
5-40 SYRINGE (ML) INJECTION EVERY 12 HOURS SCHEDULED
Status: DISCONTINUED | OUTPATIENT
Start: 2024-07-28 | End: 2024-07-30 | Stop reason: ALTCHOICE

## 2024-07-28 RX ORDER — POLYETHYLENE GLYCOL 3350 17 G/17G
17 POWDER, FOR SOLUTION ORAL DAILY PRN
Status: DISCONTINUED | OUTPATIENT
Start: 2024-07-28 | End: 2024-07-30 | Stop reason: HOSPADM

## 2024-07-28 RX ORDER — HEPARIN SODIUM 1000 [USP'U]/ML
4000 INJECTION, SOLUTION INTRAVENOUS; SUBCUTANEOUS PRN
Status: DISCONTINUED | OUTPATIENT
Start: 2024-07-28 | End: 2024-07-30 | Stop reason: ALTCHOICE

## 2024-07-28 RX ORDER — SODIUM CHLORIDE 0.9 % (FLUSH) 0.9 %
5-40 SYRINGE (ML) INJECTION PRN
Status: DISCONTINUED | OUTPATIENT
Start: 2024-07-28 | End: 2024-07-30 | Stop reason: ALTCHOICE

## 2024-07-28 RX ORDER — NITROGLYCERIN 0.4 MG/1
0.4 TABLET SUBLINGUAL EVERY 5 MIN PRN
Status: DISCONTINUED | OUTPATIENT
Start: 2024-07-28 | End: 2024-07-30 | Stop reason: HOSPADM

## 2024-07-28 RX ORDER — SODIUM CHLORIDE 9 MG/ML
INJECTION, SOLUTION INTRAVENOUS PRN
Status: DISCONTINUED | OUTPATIENT
Start: 2024-07-28 | End: 2024-07-30 | Stop reason: HOSPADM

## 2024-07-28 RX ORDER — ASPIRIN 81 MG/1
81 TABLET ORAL DAILY
Status: DISCONTINUED | OUTPATIENT
Start: 2024-07-29 | End: 2024-07-30 | Stop reason: HOSPADM

## 2024-07-28 RX ORDER — ACETAMINOPHEN 325 MG/1
650 TABLET ORAL EVERY 6 HOURS PRN
Status: DISCONTINUED | OUTPATIENT
Start: 2024-07-28 | End: 2024-07-30 | Stop reason: HOSPADM

## 2024-07-28 RX ORDER — HEPARIN SODIUM 1000 [USP'U]/ML
4000 INJECTION, SOLUTION INTRAVENOUS; SUBCUTANEOUS ONCE
Status: COMPLETED | OUTPATIENT
Start: 2024-07-28 | End: 2024-07-29

## 2024-07-28 RX ORDER — ASPIRIN 81 MG/1
324 TABLET, CHEWABLE ORAL ONCE
Status: COMPLETED | OUTPATIENT
Start: 2024-07-28 | End: 2024-07-28

## 2024-07-28 RX ORDER — ONDANSETRON 2 MG/ML
4 INJECTION INTRAMUSCULAR; INTRAVENOUS EVERY 6 HOURS PRN
Status: DISCONTINUED | OUTPATIENT
Start: 2024-07-28 | End: 2024-07-30 | Stop reason: HOSPADM

## 2024-07-28 RX ORDER — TIZANIDINE 4 MG/1
8 TABLET ORAL NIGHTLY
Status: DISCONTINUED | OUTPATIENT
Start: 2024-07-29 | End: 2024-07-30 | Stop reason: HOSPADM

## 2024-07-28 RX ORDER — ATORVASTATIN CALCIUM 80 MG/1
80 TABLET, FILM COATED ORAL NIGHTLY
Status: DISCONTINUED | OUTPATIENT
Start: 2024-07-29 | End: 2024-07-30 | Stop reason: HOSPADM

## 2024-07-28 RX ORDER — ONDANSETRON 4 MG/1
4 TABLET, ORALLY DISINTEGRATING ORAL EVERY 8 HOURS PRN
Status: DISCONTINUED | OUTPATIENT
Start: 2024-07-28 | End: 2024-07-30 | Stop reason: HOSPADM

## 2024-07-28 RX ORDER — POTASSIUM CHLORIDE 7.45 MG/ML
10 INJECTION INTRAVENOUS PRN
Status: DISCONTINUED | OUTPATIENT
Start: 2024-07-28 | End: 2024-07-30 | Stop reason: HOSPADM

## 2024-07-28 RX ORDER — AMLODIPINE BESYLATE 5 MG/1
5 TABLET ORAL DAILY
Status: DISCONTINUED | OUTPATIENT
Start: 2024-07-29 | End: 2024-07-30 | Stop reason: HOSPADM

## 2024-07-28 RX ADMIN — ASPIRIN 324 MG: 81 TABLET, CHEWABLE ORAL at 19:23

## 2024-07-28 ASSESSMENT — PAIN DESCRIPTION - FREQUENCY
FREQUENCY: CONTINUOUS

## 2024-07-28 ASSESSMENT — PAIN DESCRIPTION - DESCRIPTORS
DESCRIPTORS: HEAVINESS

## 2024-07-28 ASSESSMENT — PAIN - FUNCTIONAL ASSESSMENT
PAIN_FUNCTIONAL_ASSESSMENT: 0-10
PAIN_FUNCTIONAL_ASSESSMENT: NONE - DENIES PAIN
PAIN_FUNCTIONAL_ASSESSMENT: 0-10

## 2024-07-28 ASSESSMENT — PAIN DESCRIPTION - LOCATION
LOCATION: CHEST

## 2024-07-28 ASSESSMENT — PAIN DESCRIPTION - PAIN TYPE: TYPE: ACUTE PAIN

## 2024-07-28 ASSESSMENT — PAIN DESCRIPTION - ORIENTATION
ORIENTATION: LEFT

## 2024-07-28 ASSESSMENT — PAIN SCALES - GENERAL
PAINLEVEL_OUTOF10: 3

## 2024-07-28 ASSESSMENT — HEART SCORE: ECG: SIGNIFICANT ST-DEVIATION

## 2024-07-28 NOTE — ED PROVIDER NOTES
[facility] EMERGENCY DEPT  EMERGENCY DEPARTMENT ENCOUNTER          Pt Name: Rose Marie Friedman  MRN: [id number]  Birthdate 1950  Date of evaluation: 7/28/2024  Physician: Tyree Mejia MD  Supervising Attending Physician: Noe Iniguez DO       CHIEF COMPLAINT       Chief Complaint   Patient presents with    Chest Pain    Shoulder Pain     left         HISTORY OF PRESENT ILLNESS    HPI  Rose Marie Friedman is a 73 y.o. female who presents to the emergency department from home, as a walk in to the ED lobby for evaluation of chest pressure.  Patient reports that yesterday she was doing laundry whenever she began to have chest tightness on the left side of her chest.  She reports that has been constant in nature.  She denies any radiation.  Denies any associated diaphoresis, nausea or vomiting.  She does report shortness of breath with exertion.  She states that she has that at her baseline.  Denies any abdominal pain.  Denies any fever or chills.  Denies any change in bowels.  Patient reports that earlier today she just was not feeling well so she was checking her pulse.  She reports that she thinks her pulse was skipping so she came in for evaluation.  The patient has no other acute complaints at this time.        PAST MEDICAL AND SURGICAL HISTORY     Past Medical History:   Diagnosis Date    CAD (coronary artery disease)     Cervical radiculopathy     Degenerative arthritis of cervical spine     Degenerative lumbar disc     Fibromyalgia     GERD (gastroesophageal reflux disease)     Hydronephrosis 02/11/2016    right kidney    Hyperlipidemia     Hypertension     Hypokalemia     Hypomagnesemia     Osteoarthritis     neck,hands    Polymyalgia rheumatica (HCC)     Rheumatoid arthritis (HCC)     Spinal stenosis     UPJ (ureteropelvic junction) obstruction 06/22/2002    Vitreous detachment of left eye 1996    Vitreous detachment of right eye 2005     Past Surgical History:   Procedure Laterality Date  tablet, TAKE 1 TABLET BY MOUTH DAILY, Disp: 90 tablet, Rfl: 0    potassium chloride (KLOR-CON M) 10 MEQ extended release tablet, TAKE 1 TABLET BY MOUTH TWICE  DAILY, Disp: 180 tablet, Rfl: 1    magnesium oxide (MAG-OX) 400 MG tablet, TAKE 2 TABLETS BY MOUTH IN THE MORNING AND 2 TABLETS AT BEDTIME, Disp: 120 tablet, Rfl: 3    aspirin EC 81 MG EC tablet, Take 1 tablet by mouth daily, Disp: , Rfl:     Cholecalciferol (VITAMIN D3) 50 MCG (2000 UT) CAPS, Take by mouth, Disp: , Rfl:     zinc gluconate 50 MG tablet, Take 1 tablet by mouth daily, Disp: , Rfl:     nitroGLYCERIN (NITROSTAT) 0.4 MG SL tablet, up to max of 3 total doses. If no relief after 1 dose, call 911., Disp: 25 tablet, Rfl: 3    clindamycin (CLEOCIN) 150 MG capsule, Before dental procedures, Disp: , Rfl:     tiZANidine (ZANAFLEX) 4 MG tablet, Take 2 tablets by mouth nightly, Disp: , Rfl:     NONFORMULARY, Take 2 tablets by mouth daily Eye promise, Disp: , Rfl:     Multiple Vitamin TABS, Take by mouth every morning, Disp: , Rfl:     CALCIUM CITRATE PO, Take 600 mg by mouth daily , Disp: , Rfl:     Ascorbic Acid (VITAMIN C) 1000 MG tablet, Take 1 tablet by mouth daily, Disp: , Rfl:     Previous Medications    AMLODIPINE (NORVASC) 5 MG TABLET    TAKE 1 TABLET BY MOUTH DAILY    ASCORBIC ACID (VITAMIN C) 1000 MG TABLET    Take 1 tablet by mouth daily    ASPIRIN EC 81 MG EC TABLET    Take 1 tablet by mouth daily    ATENOLOL-CHLORTHALIDONE (TENORETIC) 50-25 MG PER TABLET    TAKE 1 TABLET BY MOUTH TWICE  DAILY    CALCIUM CITRATE PO    Take 600 mg by mouth daily     CHOLECALCIFEROL (VITAMIN D3) 50 MCG (2000 UT) CAPS    Take by mouth    CLINDAMYCIN (CLEOCIN) 150 MG CAPSULE    Before dental procedures    MAGNESIUM OXIDE (MAG-OX) 400 MG TABLET    TAKE 2 TABLETS BY MOUTH IN THE MORNING AND 2 TABLETS AT BEDTIME    MULTIPLE VITAMIN TABS    Take by mouth every morning    NITROGLYCERIN (NITROSTAT) 0.4 MG SL TABLET    up to max of 3 total doses. If no relief after 1 dose,

## 2024-07-28 NOTE — ED PROVIDER NOTES
I performed a history and physical examination of the patient and discussed management with the resident. I reviewed the resident’s note and agree with the documented findings and plan of care. Any areas of disagreement are noted on the chart. I was personally present for the key portions of any procedures. I have documented in the chart those procedures where I was not present during the key portions. I have reviewed the emergency nurses triage note. I agree with the chief complaint, past medical history, past surgical history, allergies, medications, social and family history as documented unless otherwise noted below. Documentation of the HPI, Physical Exam and Medical Decision Making performed by medical students or scribes is based on my personal performance of the HPI, PE and MDM. For Phys Assistant/ Nurse Practitioner cases/documentation I have personally evaluated this patient and have completed at least one if not all key elements of the E/M (history, physical exam, and MDM). My findings are as noted below.    In other words, I personally saw and examined the patient I have reviewed and agreed with the resident findings including all diagnostic interpretations and treatment plans as written.  I was present for the key portion of any procedures performed and the inclusive time noted in any critical care statement.    Patient presents today for chest heaviness, some left shoulder pain.  This started yesterday.  Patient does not have an onset.  He does not have a trauma although she did mention to the nurse that it might be when she was picking up a laundry basket.  Patient states that she also has left-sided jaw pain, however that pain is chronic and she does wear a bite block at night for her severe bruxism.  Patient became concerned so she decided to come in for evaluation and treatment.  Here today she just feels a heaviness.  She was feeling skipped beats every third beat she states that was earlier

## 2024-07-28 NOTE — H&P
Hospitalist - History & Physical      Patient: Rose Marie Friedman    Unit/Bed:14/014A  YOB: 1950  MRN: [id number]   Acct: [id number]   PCP: Marina Campos MD      Assessment and Plan:        Unstable angina:   Constant 3/10 pressure in left chest, back and left shoulder/upper arm  Start heparin drip  ASA given in ED  Trial nitro SL, if there is improvement initiate nitro drip  Continue daily ASA therapy  Initiate high intensity statin therapy  ECHO in AM  Cardiology consult  CAD:   [facility] 2015 with nonobstructive disease  Continue ASA, initiate statin therapy  Essential hypertension:   Continue amlodipine and atenolol-chlorthiadone      CC:  chest pressure     HPI: 73-year-old female presents emergency department with left chest pressure, left upper arm pain, and left upper back pain.  Patient states the pain started 1 day prior with exertion.  Today the patient has had persistent pressure even with rest.  Patient also reports generally not feeling well -loss of appetite, early satiety with eating, low-grade nausea.    In the emergency department patient's initial troponins are found to be a flat trend of 15 and then 13.  Patient is noted to have some new T wave inversions.    Patient will be admitted for further evaluation of unstable angina.  Patient will be started on a heparin drip with a nitroglycerin trial.    ROS: Review of Systems   Constitutional:  Positive for activity change and appetite change. Negative for diaphoresis.   HENT: Negative.     Eyes: Negative.    Respiratory:  Negative for shortness of breath.    Cardiovascular:  Positive for chest pain. Negative for palpitations.   Gastrointestinal:  Positive for nausea.   Endocrine: Negative.    Genitourinary: Negative.    Musculoskeletal: Negative.    Skin: Negative.    Allergic/Immunologic: Negative.    Neurological: Negative.    Hematological: Negative.    Psychiatric/Behavioral: Negative.       PMH:    Past Medical History:  - 47.0 %    MCV 93.9 81.0 - 99.0 fL    MCH 30.7 26.0 - 33.0 pg    MCHC 32.7 32.2 - 35.5 gm/dl    RDW-CV 12.8 11.5 - 14.5 %    RDW-SD 44.0 35.0 - 45.0 fL    Platelets 253 130 - 400 thou/mm3    MPV 9.0 (L) 9.4 - 12.4 fL    Seg Neutrophils 54.4 %    Lymphocytes 31.5 %    Monocytes % 9.9 %    Eosinophils 3.0 %    Basophils 0.8 %    Immature Granulocytes % 0.4 %    Neutrophils Absolute 4.9 1.8 - 7.7 thou/mm3    Lymphocytes Absolute 2.8 1.0 - 4.8 thou/mm3    Monocytes Absolute 0.9 0.4 - 1.3 thou/mm3    Eosinophils Absolute 0.3 0.0 - 0.4 thou/mm3    Basophils Absolute 0.1 0.0 - 0.1 thou/mm3    Immature Grans (Abs) 0.04 0.00 - 0.07 thou/mm3    nRBC 0 /100 wbc   Brain Natriuretic Peptide    Collection Time: 07/28/24  4:12 PM   Result Value Ref Range    Pro-.5 (H) 0.0 - 124.0 pg/mL   Basic Metabolic Panel    Collection Time: 07/28/24  4:12 PM   Result Value Ref Range    Sodium 139 135 - 145 meq/L    Potassium 3.7 3.5 - 5.2 meq/L    Chloride 99 98 - 111 meq/L    CO2 26 23 - 33 meq/L    Glucose 126 (H) 70 - 108 mg/dL    BUN 19 7 - 22 mg/dL    Creatinine 0.8 0.4 - 1.2 mg/dL    Calcium 10.4 8.5 - 10.5 mg/dL   Hepatic Function Panel    Collection Time: 07/28/24  4:12 PM   Result Value Ref Range    Albumin 4.4 3.5 - 5.1 g/dL    Total Bilirubin 0.3 0.3 - 1.2 mg/dL    Bilirubin, Direct <0.1 0.1 - 13.8 mg/dL    Alkaline Phosphatase 75 38 - 126 U/L    AST 22 5 - 40 U/L    ALT 16 11 - 66 U/L    Total Protein 7.3 6.1 - 8.0 g/dL   Lipase    Collection Time: 07/28/24  4:12 PM   Result Value Ref Range    Lipase 47.4 5.6 - 51.3 U/L   Troponin    Collection Time: 07/28/24  4:12 PM   Result Value Ref Range    Troponin, High Sensitivity 15 (H) 0 - 12 ng/L   Magnesium    Collection Time: 07/28/24  4:12 PM   Result Value Ref Range    Magnesium 1.7 1.6 - 2.4 mg/dL   Anion Gap    Collection Time: 07/28/24  4:12 PM   Result Value Ref Range    Anion Gap 14.0 8.0 - 16.0 meq/L   Glomerular Filtration Rate, Estimated    Collection Time: 07/28/24

## 2024-07-28 NOTE — ED TRIAGE NOTES
Patient presents to ER with complaints of chest heaviness and left shoulder pain that started yesterday. Patient reports she just doesn't feel right. EKG obtained. Telemetry applied.

## 2024-07-28 NOTE — ED NOTES
Pt resting in bed. Denies needs. States chest heaviness a 3/10 but tolerable. Call light within reach.

## 2024-07-29 ENCOUNTER — APPOINTMENT (OUTPATIENT)
Age: 74
End: 2024-07-29
Attending: PHYSICIAN ASSISTANT
Payer: MEDICARE

## 2024-07-29 LAB
ANION GAP SERPL CALC-SCNC: 13 MEQ/L (ref 8–16)
APTT PPP: 40.4 SECONDS (ref 22–38)
BUN SERPL-MCNC: 16 MG/DL (ref 7–22)
CALCIUM SERPL-MCNC: 9.7 MG/DL (ref 8.5–10.5)
CHLORIDE SERPL-SCNC: 99 MEQ/L (ref 98–111)
CHOLEST SERPL-MCNC: 156 MG/DL (ref 100–199)
CO2 SERPL-SCNC: 25 MEQ/L (ref 23–33)
CREAT SERPL-MCNC: 0.7 MG/DL (ref 0.4–1.2)
DEPRECATED MEAN GLUCOSE BLD GHB EST-ACNC: 105 MG/DL (ref 70–126)
DEPRECATED RDW RBC AUTO: 43.6 FL (ref 35–45)
ECHO AO ASC DIAM: 3.3 CM
ECHO AO ASCENDING AORTA INDEX: 1.75 CM/M2
ECHO AV CUSP MM: 1.7 CM
ECHO AV MEAN GRADIENT: 5 MMHG
ECHO AV MEAN VELOCITY: 1 M/S
ECHO AV PEAK GRADIENT: 11 MMHG
ECHO AV PEAK VELOCITY: 1.6 M/S
ECHO AV VELOCITY RATIO: 0.69
ECHO AV VTI: 33.7 CM
ECHO BSA: 1.92 M2
ECHO BSA: 1.92 M2
ECHO EST RA PRESSURE: 3 MMHG
ECHO LA AREA 2C: 18.9 CM2
ECHO LA AREA 4C: 14.5 CM2
ECHO LA DIAMETER INDEX: 1.8 CM/M2
ECHO LA DIAMETER: 3.4 CM
ECHO LA MAJOR AXIS: 5.2 CM
ECHO LA MINOR AXIS: 5.6 CM
ECHO LA VOL BP: 42 ML (ref 22–52)
ECHO LA VOL MOD A2C: 52 ML (ref 22–52)
ECHO LA VOL MOD A4C: 31 ML (ref 22–52)
ECHO LA VOL/BSA BIPLANE: 22 ML/M2 (ref 16–34)
ECHO LA VOLUME INDEX MOD A2C: 28 ML/M2 (ref 16–34)
ECHO LA VOLUME INDEX MOD A4C: 16 ML/M2 (ref 16–34)
ECHO LV E' LATERAL VELOCITY: 7 CM/S
ECHO LV E' SEPTAL VELOCITY: 4 CM/S
ECHO LV EDV A2C: 48 ML
ECHO LV EDV A4C: 71 ML
ECHO LV EDV INDEX A4C: 38 ML/M2
ECHO LV EDV NDEX A2C: 25 ML/M2
ECHO LV EJECTION FRACTION A2C: 56 %
ECHO LV EJECTION FRACTION A4C: 58 %
ECHO LV EJECTION FRACTION BIPLANE: 59 % (ref 55–100)
ECHO LV ESV A2C: 21 ML
ECHO LV ESV A4C: 29 ML
ECHO LV ESV INDEX A2C: 11 ML/M2
ECHO LV ESV INDEX A4C: 15 ML/M2
ECHO LV FRACTIONAL SHORTENING: 34 % (ref 28–44)
ECHO LV INTERNAL DIMENSION DIASTOLE INDEX: 2.65 CM/M2
ECHO LV INTERNAL DIMENSION DIASTOLIC: 5 CM (ref 3.9–5.3)
ECHO LV INTERNAL DIMENSION SYSTOLIC INDEX: 1.75 CM/M2
ECHO LV INTERNAL DIMENSION SYSTOLIC: 3.3 CM
ECHO LV ISOVOLUMETRIC RELAXATION TIME (IVRT): 123 MS
ECHO LV IVSD: 0.8 CM (ref 0.6–0.9)
ECHO LV MASS 2D: 146.8 G (ref 67–162)
ECHO LV MASS INDEX 2D: 77.7 G/M2 (ref 43–95)
ECHO LV POSTERIOR WALL DIASTOLIC: 0.9 CM (ref 0.6–0.9)
ECHO LV RELATIVE WALL THICKNESS RATIO: 0.36
ECHO LVOT AV VTI INDEX: 0.74
ECHO LVOT MEAN GRADIENT: 2 MMHG
ECHO LVOT PEAK GRADIENT: 5 MMHG
ECHO LVOT PEAK VELOCITY: 1.1 M/S
ECHO LVOT VTI: 24.9 CM
ECHO MV A VELOCITY: 1.09 M/S
ECHO MV E DECELERATION TIME (DT): 253 MS
ECHO MV E VELOCITY: 0.8 M/S
ECHO MV E/A RATIO: 0.73
ECHO MV E/E' LATERAL: 11.43
ECHO MV E/E' RATIO (AVERAGED): 15.71
ECHO MV E/E' SEPTAL: 20
ECHO MV REGURGITANT PEAK GRADIENT: 49 MMHG
ECHO MV REGURGITANT PEAK VELOCITY: 3.5 M/S
ECHO PULMONARY ARTERY END DIASTOLIC PRESSURE: 7 MMHG
ECHO PV MAX VELOCITY: 0.6 M/S
ECHO PV PEAK GRADIENT: 1 MMHG
ECHO PV REGURGITANT MAX VELOCITY: 1.3 M/S
ECHO RIGHT VENTRICULAR SYSTOLIC PRESSURE (RVSP): 31 MMHG
ECHO RV INTERNAL DIMENSION: 3.6 CM
ECHO RV TAPSE: 1.8 CM (ref 1.7–?)
ECHO TV E WAVE: 0.5 M/S
ECHO TV REGURGITANT MAX VELOCITY: 2.66 M/S
ECHO TV REGURGITANT PEAK GRADIENT: 28 MMHG
EKG ATRIAL RATE: 62 BPM
EKG ATRIAL RATE: 70 BPM
EKG P AXIS: 43 DEGREES
EKG P AXIS: 53 DEGREES
EKG P-R INTERVAL: 176 MS
EKG P-R INTERVAL: 184 MS
EKG Q-T INTERVAL: 372 MS
EKG Q-T INTERVAL: 396 MS
EKG QRS DURATION: 88 MS
EKG QRS DURATION: 92 MS
EKG QTC CALCULATION (BAZETT): 401 MS
EKG QTC CALCULATION (BAZETT): 401 MS
EKG R AXIS: -5 DEGREES
EKG T AXIS: -29 DEGREES
EKG T AXIS: -31 DEGREES
EKG VENTRICULAR RATE: 62 BPM
EKG VENTRICULAR RATE: 70 BPM
ERYTHROCYTE [DISTWIDTH] IN BLOOD BY AUTOMATED COUNT: 12.7 % (ref 11.5–14.5)
GFR SERPL CREATININE-BSD FRML MDRD: 90 ML/MIN/1.73M2
GLUCOSE SERPL-MCNC: 103 MG/DL (ref 70–108)
HBA1C MFR BLD HPLC: 5.5 % (ref 4.4–6.4)
HCT VFR BLD AUTO: 37.3 % (ref 37–47)
HDLC SERPL-MCNC: 63 MG/DL
HEPARIN UNFRACTIONATED: 0.76 U/ML (ref 0.3–0.7)
HEPARIN UNFRACTIONATED: 1.26 U/ML (ref 0.3–0.7)
HEPARIN UNFRACTIONATED: < 0.04 U/ML (ref 0.3–0.7)
HGB BLD-MCNC: 12.6 GM/DL (ref 12–16)
INR PPP: 1.03 (ref 0.85–1.13)
LDLC SERPL CALC-MCNC: 70 MG/DL
MAGNESIUM SERPL-MCNC: 1.7 MG/DL (ref 1.6–2.4)
MCH RBC QN AUTO: 31.5 PG (ref 26–33)
MCHC RBC AUTO-ENTMCNC: 33.8 GM/DL (ref 32.2–35.5)
MCV RBC AUTO: 93.3 FL (ref 81–99)
PLATELET # BLD AUTO: 239 THOU/MM3 (ref 130–400)
PMV BLD AUTO: 9.2 FL (ref 9.4–12.4)
POTASSIUM SERPL-SCNC: 3.4 MEQ/L (ref 3.5–5.2)
RBC # BLD AUTO: 4 MILL/MM3 (ref 4.2–5.4)
SODIUM SERPL-SCNC: 137 MEQ/L (ref 135–145)
TRIGL SERPL-MCNC: 113 MG/DL (ref 0–199)
TROPONIN, HIGH SENSITIVITY: 14 NG/L (ref 0–12)
TROPONIN, HIGH SENSITIVITY: 14 NG/L (ref 0–12)
WBC # BLD AUTO: 9.4 THOU/MM3 (ref 4.8–10.8)

## 2024-07-29 PROCEDURE — 93458 L HRT ARTERY/VENTRICLE ANGIO: CPT | Performed by: INTERNAL MEDICINE

## 2024-07-29 PROCEDURE — C1769 GUIDE WIRE: HCPCS | Performed by: INTERNAL MEDICINE

## 2024-07-29 PROCEDURE — 83735 ASSAY OF MAGNESIUM: CPT

## 2024-07-29 PROCEDURE — 2709999900 HC NON-CHARGEABLE SUPPLY: Performed by: INTERNAL MEDICINE

## 2024-07-29 PROCEDURE — 80061 LIPID PANEL: CPT

## 2024-07-29 PROCEDURE — 93005 ELECTROCARDIOGRAM TRACING: CPT

## 2024-07-29 PROCEDURE — B2111ZZ FLUOROSCOPY OF MULTIPLE CORONARY ARTERIES USING LOW OSMOLAR CONTRAST: ICD-10-PCS | Performed by: INTERNAL MEDICINE

## 2024-07-29 PROCEDURE — 93306 TTE W/DOPPLER COMPLETE: CPT

## 2024-07-29 PROCEDURE — 99152 MOD SED SAME PHYS/QHP 5/>YRS: CPT | Performed by: INTERNAL MEDICINE

## 2024-07-29 PROCEDURE — 36415 COLL VENOUS BLD VENIPUNCTURE: CPT

## 2024-07-29 PROCEDURE — 80048 BASIC METABOLIC PNL TOTAL CA: CPT

## 2024-07-29 PROCEDURE — 84484 ASSAY OF TROPONIN QUANT: CPT

## 2024-07-29 PROCEDURE — 6360000002 HC RX W HCPCS: Performed by: PHYSICIAN ASSISTANT

## 2024-07-29 PROCEDURE — 2140000000 HC CCU INTERMEDIATE R&B

## 2024-07-29 PROCEDURE — 83036 HEMOGLOBIN GLYCOSYLATED A1C: CPT

## 2024-07-29 PROCEDURE — 99223 1ST HOSP IP/OBS HIGH 75: CPT | Performed by: INTERNAL MEDICINE

## 2024-07-29 PROCEDURE — 6360000004 HC RX CONTRAST MEDICATION: Performed by: INTERNAL MEDICINE

## 2024-07-29 PROCEDURE — B2151ZZ FLUOROSCOPY OF LEFT HEART USING LOW OSMOLAR CONTRAST: ICD-10-PCS | Performed by: INTERNAL MEDICINE

## 2024-07-29 PROCEDURE — 6370000000 HC RX 637 (ALT 250 FOR IP): Performed by: PHYSICIAN ASSISTANT

## 2024-07-29 PROCEDURE — 93306 TTE W/DOPPLER COMPLETE: CPT | Performed by: INTERNAL MEDICINE

## 2024-07-29 PROCEDURE — 93010 ELECTROCARDIOGRAM REPORT: CPT | Performed by: INTERNAL MEDICINE

## 2024-07-29 PROCEDURE — 2500000003 HC RX 250 WO HCPCS: Performed by: INTERNAL MEDICINE

## 2024-07-29 PROCEDURE — C1894 INTRO/SHEATH, NON-LASER: HCPCS | Performed by: INTERNAL MEDICINE

## 2024-07-29 PROCEDURE — 99232 SBSQ HOSP IP/OBS MODERATE 35: CPT | Performed by: INTERNAL MEDICINE

## 2024-07-29 PROCEDURE — 85027 COMPLETE CBC AUTOMATED: CPT

## 2024-07-29 PROCEDURE — 6370000000 HC RX 637 (ALT 250 FOR IP): Performed by: INTERNAL MEDICINE

## 2024-07-29 PROCEDURE — 85520 HEPARIN ASSAY: CPT

## 2024-07-29 PROCEDURE — 6360000002 HC RX W HCPCS: Performed by: INTERNAL MEDICINE

## 2024-07-29 PROCEDURE — 4A023N7 MEASUREMENT OF CARDIAC SAMPLING AND PRESSURE, LEFT HEART, PERCUTANEOUS APPROACH: ICD-10-PCS | Performed by: INTERNAL MEDICINE

## 2024-07-29 RX ORDER — SODIUM CHLORIDE 0.9 % (FLUSH) 0.9 %
5-40 SYRINGE (ML) INJECTION PRN
Status: DISCONTINUED | OUTPATIENT
Start: 2024-07-29 | End: 2024-07-29 | Stop reason: SDUPTHER

## 2024-07-29 RX ORDER — SODIUM CHLORIDE 9 MG/ML
INJECTION, SOLUTION INTRAVENOUS PRN
Status: DISCONTINUED | OUTPATIENT
Start: 2024-07-29 | End: 2024-07-30 | Stop reason: ALTCHOICE

## 2024-07-29 RX ORDER — LIDOCAINE 4 G/G
1 PATCH TOPICAL DAILY
Status: DISCONTINUED | OUTPATIENT
Start: 2024-07-29 | End: 2024-07-30 | Stop reason: HOSPADM

## 2024-07-29 RX ORDER — ACETAMINOPHEN 325 MG/1
650 TABLET ORAL EVERY 4 HOURS PRN
Status: DISCONTINUED | OUTPATIENT
Start: 2024-07-29 | End: 2024-07-30 | Stop reason: ALTCHOICE

## 2024-07-29 RX ORDER — SODIUM CHLORIDE 9 MG/ML
INJECTION, SOLUTION INTRAVENOUS CONTINUOUS
Status: ACTIVE | OUTPATIENT
Start: 2024-07-29 | End: 2024-07-29

## 2024-07-29 RX ORDER — LIDOCAINE HYDROCHLORIDE 20 MG/ML
INJECTION, SOLUTION EPIDURAL; INFILTRATION; INTRACAUDAL; PERINEURAL PRN
Status: DISCONTINUED | OUTPATIENT
Start: 2024-07-29 | End: 2024-07-29 | Stop reason: HOSPADM

## 2024-07-29 RX ORDER — ATROPINE SULFATE 0.4 MG/ML
0.5 INJECTION, SOLUTION INTRAVENOUS
Status: DISCONTINUED | OUTPATIENT
Start: 2024-07-29 | End: 2024-07-30 | Stop reason: HOSPADM

## 2024-07-29 RX ORDER — SODIUM CHLORIDE 0.9 % (FLUSH) 0.9 %
5-40 SYRINGE (ML) INJECTION EVERY 12 HOURS SCHEDULED
Status: DISCONTINUED | OUTPATIENT
Start: 2024-07-29 | End: 2024-07-30 | Stop reason: HOSPADM

## 2024-07-29 RX ORDER — SODIUM CHLORIDE 0.9 % (FLUSH) 0.9 %
5-40 SYRINGE (ML) INJECTION PRN
Status: DISCONTINUED | OUTPATIENT
Start: 2024-07-29 | End: 2024-07-30 | Stop reason: HOSPADM

## 2024-07-29 RX ORDER — SODIUM CHLORIDE 9 MG/ML
INJECTION, SOLUTION INTRAVENOUS PRN
Status: DISCONTINUED | OUTPATIENT
Start: 2024-07-29 | End: 2024-07-29 | Stop reason: SDUPTHER

## 2024-07-29 RX ORDER — FENTANYL CITRATE 50 UG/ML
INJECTION, SOLUTION INTRAMUSCULAR; INTRAVENOUS PRN
Status: DISCONTINUED | OUTPATIENT
Start: 2024-07-29 | End: 2024-07-29 | Stop reason: HOSPADM

## 2024-07-29 RX ORDER — MIDAZOLAM HYDROCHLORIDE 1 MG/ML
INJECTION INTRAMUSCULAR; INTRAVENOUS PRN
Status: DISCONTINUED | OUTPATIENT
Start: 2024-07-29 | End: 2024-07-29 | Stop reason: HOSPADM

## 2024-07-29 RX ORDER — SODIUM CHLORIDE 0.9 % (FLUSH) 0.9 %
5-40 SYRINGE (ML) INJECTION EVERY 12 HOURS SCHEDULED
Status: DISCONTINUED | OUTPATIENT
Start: 2024-07-29 | End: 2024-07-29 | Stop reason: SDUPTHER

## 2024-07-29 RX ADMIN — TIZANIDINE 8 MG: 4 TABLET ORAL at 23:27

## 2024-07-29 RX ADMIN — TIZANIDINE 8 MG: 4 TABLET ORAL at 00:33

## 2024-07-29 RX ADMIN — ATENOLOL AND CHLORTHALIDONE TABLET 1 TABLET: 50; 25 TABLET ORAL at 00:33

## 2024-07-29 RX ADMIN — HEPARIN SODIUM 4000 UNITS: 1000 INJECTION INTRAVENOUS; SUBCUTANEOUS at 00:01

## 2024-07-29 RX ADMIN — OMEPRAZOLE 20 MG: 20 CAPSULE, DELAYED RELEASE ORAL at 06:06

## 2024-07-29 RX ADMIN — AMLODIPINE BESYLATE 5 MG: 5 TABLET ORAL at 08:45

## 2024-07-29 RX ADMIN — POTASSIUM CHLORIDE 40 MEQ: 750 TABLET, FILM COATED, EXTENDED RELEASE ORAL at 11:32

## 2024-07-29 RX ADMIN — ATENOLOL AND CHLORTHALIDONE TABLET 1 TABLET: 50; 25 TABLET ORAL at 20:23

## 2024-07-29 RX ADMIN — HEPARIN SODIUM 12 UNITS/KG/HR: 10000 INJECTION, SOLUTION INTRAVENOUS at 00:01

## 2024-07-29 RX ADMIN — ASPIRIN 81 MG: 81 TABLET, COATED ORAL at 08:44

## 2024-07-29 RX ADMIN — ATENOLOL AND CHLORTHALIDONE TABLET 1 TABLET: 50; 25 TABLET ORAL at 08:45

## 2024-07-29 ASSESSMENT — PAIN SCALES - GENERAL
PAINLEVEL_OUTOF10: 3
PAINLEVEL_OUTOF10: 1
PAINLEVEL_OUTOF10: 2
PAINLEVEL_OUTOF10: 3
PAINLEVEL_OUTOF10: 2

## 2024-07-29 ASSESSMENT — PAIN DESCRIPTION - FREQUENCY
FREQUENCY: CONTINUOUS

## 2024-07-29 ASSESSMENT — PAIN DESCRIPTION - ONSET
ONSET: ON-GOING

## 2024-07-29 ASSESSMENT — PAIN DESCRIPTION - DESCRIPTORS
DESCRIPTORS: PRESSURE
DESCRIPTORS: HEAVINESS
DESCRIPTORS: PRESSURE
DESCRIPTORS: PRESSURE

## 2024-07-29 ASSESSMENT — PAIN DESCRIPTION - ORIENTATION
ORIENTATION: LEFT

## 2024-07-29 ASSESSMENT — PAIN DESCRIPTION - LOCATION
LOCATION: CHEST;JAW;SHOULDER
LOCATION: CHEST;JAW;SHOULDER
LOCATION: CHEST
LOCATION: CHEST;JAW;SHOULDER
LOCATION: CHEST

## 2024-07-29 ASSESSMENT — PAIN DESCRIPTION - PAIN TYPE
TYPE: ACUTE PAIN

## 2024-07-29 ASSESSMENT — PAIN - FUNCTIONAL ASSESSMENT: PAIN_FUNCTIONAL_ASSESSMENT: PREVENTS OR INTERFERES SOME ACTIVE ACTIVITIES AND ADLS

## 2024-07-29 NOTE — BRIEF OP NOTE
[facility]  Sedation/Analgesia Post Sedation Record        Pt Name: Rose Marie Friedman  MRN: [id number]  YOB: 1950  Procedure Performed By: Patrice Nieto MD MD, TYSON, CLARITA, ELLE  Primary Care Physician: Marina Campos MD    POST-PROCEDURE    Sedation/Anesthesia:  Local Anesthesia and IV Conscious Sedation with continuous O2 monitoring    Estimated Blood Loss: 10 cc     Specimens Removed:  [x]None []Other:      Disposition of Specimen:  []Pathology []Other        Complications:   [x]None Immediate []Other:       Procedure performed: Left heart cath    Post Procedure Diagnosis/Findings:  Non-obstructive Coronary Artery Disease        Recommendations:    Medical treatment  Routine post-cath care.               Patrice Nieto MD MD, FACRICKI, CLARITA, ELLE  Electronically signed 7/29/2024 at 6:35 PM

## 2024-07-29 NOTE — PROCEDURES
PROCEDURE NOTE  Date: 7/29/2024   Name: Rose Marie Friedman  YOB: 1950    Procedures    12 lead EKG completed. Results handed to RN

## 2024-07-29 NOTE — PROGRESS NOTES
[facility]  Sedation/Analgesia History & Physical    Pt Name: Rose Marie Friedman  Account number: [id number]  MRN: [id number]  YOB: 1950  Provider Performing Procedure: Patrice Nieto MD MD [facility]  Primary Care Physician: Marina Campos MD  Date: 7/29/2024    PRE-PROCEDURE    Code Status: FULL CODE  Brief History/Pre-Procedure Diagnosis: unstable angina     Consent: : I have discussed with the patient risks, benefits, and alternatives (and relevant risks, benefits, and side effects related to alternatives or not receiving care), and likelihood of the success.   The patient and/or representative appear to understand and agree to proceed.  The discussion encompasses risks, benefits, and side effects related to the alternatives and the risks related to not receiving the proposed care, treatment, and services.       PLANNED PROCEDURE   [x]Cath  [x]PCI                []Pacemaker/AICD  []RONNIE             []Cardioversion []Peripheral angiography/PTA  []Other:      VITAL SIGNS   Vitals:    07/29/24 1226   BP: 137/77   Pulse: 65   Resp: 18   Temp: 98 °F (36.7 °C)   SpO2: 98%       PHYSICAL:   General: No acute distress  HEENT:  Unremarkable for age  Neck: without increased JVD, carotid pulses 2+ bilaterally without bruits  Heart: RRR, S1 & S2 WNL   Lungs: Clear to auscultation    Abdomen: BS present, without HSM, masses, or tenderness    Extremities: without C,C,E.  Pulses 2+ bilaterally  Mental Status: Alert & Oriented    SEDATION  Planned agent:[x]Midazolam []Meperidine []Sublimaze []Morphine  []Diazepam  [x]Other: fentanyl      ASA Classification:  []1 []2 [x]3 []4 []5  Class 1: A normal healthy patient  Class 2: Pt with mild to moderate systemic disease  Class 3: Severe systemic disease or disturbance  Class 4: Severe systemic disorders that are already life threatening.  Class 5: Moribund pt with little chances of survival, for more than 24 hours.  Mallampati I Airway Classification:   []1  Daily PRN, Morenita Hargrove PA-C    atorvastatin (LIPITOR) tablet 80 mg, 80 mg, Oral, Nightly, Morenita Hargrove PA-C    perflutren lipid microspheres (DEFINITY) injection 1.5 mL, 1.5 mL, IntraVENous, ONCE PRN, Morenita Hargrove PA-C    heparin (porcine) injection 4,000 Units, 4,000 Units, IntraVENous, PRN, Morenita Hargrove PA-RICKI    heparin (porcine) injection 2,000 Units, 2,000 Units, IntraVENous, PRN, Morenita Hargrove PA-C    heparin 25,000 units in dextrose 5% 250 mL (premix) infusion, 5-30 Units/kg/hr, IntraVENous, Continuous, Morenita Hargrove PA-C, Stopped at 07/29/24 1328    nitroGLYCERIN (NITROSTAT) SL tablet 0.4 mg, 0.4 mg, SubLINGual, Q5 Min PRN, Morenita Hargrove PA-C  Prior to Admission medications    Medication Sig Start Date End Date Taking? Authorizing Provider   atenolol-chlorthalidone (TENORETIC) 50-25 MG per tablet TAKE 1 TABLET BY MOUTH TWICE  DAILY 7/12/24   Marina Campos MD   omeprazole (PRILOSEC) 20 MG delayed release capsule TAKE 1 CAPSULE BY MOUTH DAILY 6/19/24   Marina Campos MD   amLODIPine (NORVASC) 5 MG tablet TAKE 1 TABLET BY MOUTH DAILY 5/28/24   Marina Campos MD   potassium chloride (KLOR-CON M) 10 MEQ extended release tablet TAKE 1 TABLET BY MOUTH TWICE  DAILY 3/12/24   Marina Campos MD   magnesium oxide (MAG-OX) 400 MG tablet TAKE 2 TABLETS BY MOUTH IN THE MORNING AND 2 TABLETS AT BEDTIME 9/16/22   Salo Major MD   aspirin EC 81 MG EC tablet Take 1 tablet by mouth daily    Laura Daly MD   Cholecalciferol (VITAMIN D3) 50 MCG (2000 UT) CAPS Take 1 capsule by mouth daily    Laura Daly MD   zinc gluconate 50 MG tablet Take 1 tablet by mouth daily    Laura Daly MD   nitroGLYCERIN (NITROSTAT) 0.4 MG SL tablet up to max of 3 total doses. If no relief after 1 dose, call 911. 8/23/21   David, [place] M, MD   clindamycin (CLEOCIN) 150 MG capsule Before dental procedures 5/12/21   Provider, MD Laura   tiZANidine (ZANAFLEX) 4 MG tablet Take 2

## 2024-07-29 NOTE — CONSULTS
us to participate in the care of this patient.  Please do not hesitate to call us with questions.    Time spent reviewing notes, data, discussing with patient/family, and formulating plan with clinical documentation was approximately 80 minutes.     Electronically signed by Lara Tran DO PGY-1 on 7/29/2024 at 12:25 PM    Interventional Cardiology - [facility]       Attestation     I performed the subjective and objective examination of the patient and discussed management with the resident/CNP. I reviewed the resident/CNP’s note. Any areas of disagreement were adjusted in the chart. I have personally evaluated this patient and have completed at least one if not all key elements of the E/M (history, physical exam, and MDM).  Additional findings are as noted. I agree with the chief complaint, past medical history, past surgical history, allergies, medications, social and family history as documented unless otherwise noted below. A decision was made to proceed with cardiac catheterization with possible PCI as it it the recommended procedure for the patient.The indication, risks and benefits of the procedure and possible therapeutic consequences and alternatives were discussed with the patient. The patient was given the opportunity to ask questions and to have them answered to his/her satisfaction. Risks of the procedure include but are not limited to the following: Bleeding, hematoma including retroperitoneal hematoma, infection, pain and discomfort, injury to the aorta and other blood vessels, rhythm disturbance, low blood pressure, myocardial infarction, need for bypass surgery, stroke, kidney damage/failure, myocardial perforation, allergic reactions to sedatives/contrast material, loss of pulse/vascular compromise requiring surgery, aneurysm/pseudoaneurysm formation, possible loss of a limb/hand/leg, death. Alternatives to and omission of the suggested procedure were discussed. The  patient also understands the need for DAPT if PCI is necessary. The patient had no further questions and wished to proceed; the consent form was signed.      Above findings and plan of care were discussed with patient , questions were answered, agreeable to plan.     Thank you for allowing me to participate in the care of this patient.  Please let me know if I can be of any further assistance.    Electronically signed by Jazmine Taylor MD, [facility], [facility] on 7/29/2024 at 4:43 PM  Interventional Cardiology - [facility]

## 2024-07-29 NOTE — PROGRESS NOTES
Patient arrived per cart to 3B. Heart monitor applied and vitals taken.  Admission paperwork completed.  Explained to patient that [facility] is not responsible for any lost or stolen items.  Patient verbalized understanding. Oriented to room and use of call light and bed controls.  Patient denies pain or needs. No signs of distress noted.  Bed locked & in low position, side-rails up x2.  Call light in reach.  Reminded patient to call nurse if any needs arise.     2 person skin assessment performed by this nurse and Eugenie Ortega RN.    Explained patients right to have family, representative or physician notified of their admission.  Patient has Declined for physician to be notified.  Patient has Declined for family/representative to be notified.

## 2024-07-29 NOTE — PROGRESS NOTES
Hospitalist Progress Note      Patient:  Rose Marie Friedman    Unit/Bed:3B-37/037-A  YOB: 1950  MRN: [id number]   Acct: [id number]   PCP: Marina Campos MD  Date of Admission: 7/28/2024    Assessment/Plan:    Unstable angina  History of nonobstructive coronary disease  Hypertension, hyperlipidemia  Patient presenting with left sided chest pain, with radiation to LUE and jaw. Occurred at rest. Resolved on own. Has strong fhx of CAD/MI. Had cath years ago showing nonobstructive disease. Minimally elevated trop on presentation, though ECG with new changes, TWI in inferior leads, as well as V3 - 5.  No anginal symptoms currently  Cardiology consulted, appreciate recommendations  Continue ASA, atorvastatin 80mg daily  Continue heparin gtt  Pending TTE.   Continue norvasc, atenolol-chlorthalidone  Pending TTE, may switch from atenolol to metoprolol.     Chronic  GERD: continue PPI     LDA: []CVC / []PICC / []Midline / []Levi / []Drains / []Mediport / [x]None  Antibiotics: None  Steroids: None  Labs (still needed?): [x]Yes / []No  IVF (still needed?): []Yes / [x]No    Level of care: [x]Step Down / []Med-Surg  Bed Status: [x]Inpatient / []Observation  Telemetry: [x]Yes / []No  PT/OT: []Yes / [x]No    DVT Prophylaxis: [] Lovenox / [x] Heparin infusion/ [] SCDs / [] Already on Systemic Anticoagulation / [] None   DIET: Clear liquid diet  Last bowel movement: Prior to arrival  CODE STATUS: Full  Disposition: DC 1 to 2 days      Chief Complaint: Chest discomfort    Initial H and P:-    73-year-old female presents emergency department with left chest pressure, left upper arm pain, and left upper back pain.  Patient states the pain started 1 day prior with exertion.  Today the patient has had persistent pressure even with rest.  Patient also reports generally not feeling well -loss of appetite, early satiety with eating, low-grade nausea.    cooperative.  HEENT: Pupils equal, round, and reactive to light. Conjunctivae/corneas clear.  Neck: No discernible JVD  Respiratory:  Normal respiratory effort. Clear to auscultation  Cardiovascular: Regular rate and rhythm with normal S1/S2.  No audible murmur  Abdomen: Soft, non-tender, non-distended with normal bowel sounds.  Musculoskeletal: 1+ pitting edema to bilateral lower extremities  Skin: Skin color, texture, turgor normal.  No rashes or lesions.  Neurologic: Moves extremities independently.  No focal deficits  Psychiatric: Pleasant, conversational    Labs:   Recent Labs     07/28/24  1612 07/29/24  0634   WBC 9.0 9.4   HGB 13.5 12.6   HCT 41.3 37.3    239     Recent Labs     07/28/24  1612 07/29/24  0634    137   K 3.7 3.4*   CL 99 99   CO2 26 25   BUN 19 16   CREATININE 0.8 0.7   CALCIUM 10.4 9.7     Recent Labs     07/28/24  1612   AST 22   ALT 16   BILIDIR <0.1   BILITOT 0.3   ALKPHOS 75     Recent Labs     07/28/24  2335   INR 1.03     No results for input(s): \"CKTOTAL\", \"TROPONINI\" in the last 72 hours.    Microbiology:    Blood culture #1: No results found for: \"BC\"    Blood culture #2:No results found for: \"BLOODCULT2\"    Organism:No results found for: \"ORG\"    No results found for: \"LABGRAM\"    MRSA culture only:No results found for: \"MRSAC\"    Urine culture:   Lab Results   Component Value Date/Time    LABURIN SEE NOTE 09/23/2022 03:31 PM       Respiratory culture: No results found for: \"CULTRESP\"    Aerobic and Anaerobic :  No results found for: \"LABAERO\"  No results found for: \"LABANAE\"    Urinalysis:      Lab Results   Component Value Date/Time    NITRU NEGATIVE 09/23/2022 03:31 PM    WBCUA 0-2 02/15/2016 08:49 AM    BACTERIA NONE SEEN 09/23/2022 03:31 PM    BACTERIA NONE SEEN 09/23/2022 03:31 PM    RBCUA moderate 07/07/2017 03:04 PM    RBCUA 0-2 02/15/2016 08:49 AM    BLOODU Negative 08/08/2019 02:46 PM    BLOODU NEGATIVE 11/20/2017 09:20 AM    GLUCOSEU Negative 08/08/2019 02:46

## 2024-07-29 NOTE — ED NOTES
ED to inpatient nurses report      Chief Complaint:  Chief Complaint   Patient presents with    Chest Pain    Shoulder Pain     left     Present to ED from: home    MOA:     LOC: alert and orientated to name, place, date  Mobility: Requires assistance * 1  Oxygen Baseline: none    Current needs required: N/A     Code Status:   Prior      Mental Status:  Level of Consciousness: Alert (0)    Psych Assessment:        Vitals:  Patient Vitals for the past 24 hrs:   BP Temp Temp src Pulse Resp SpO2 Height Weight   07/28/24 1927 (!) 148/72 -- -- 73 17 98 % -- --   07/28/24 1844 134/69 -- -- 60 18 95 % -- --   07/28/24 1820 (!) 145/69 -- -- 63 18 95 % -- --   07/28/24 1805 (!) 145/70 -- -- 65 16 99 % -- --   07/28/24 1747 (!) 144/68 -- -- 68 16 95 % -- --   07/28/24 1731 (!) 146/72 98.2 °F (36.8 °C) Oral 60 17 96 % -- --   07/28/24 1611 (!) 151/71 98.2 °F (36.8 °C) Oral 68 16 95 % 1.676 m (5' 6\") 79.4 kg (175 lb)        LDAs:      Ambulatory Status:  No data recorded    Diagnosis:  DISPOSITION Admitted 07/28/2024 07:45:09 PM   Final diagnoses:   Chest pain, unspecified type        Consults:  None     Pain Score:  Pain Assessment  Pain Assessment: None - Denies Pain  Pain Level: 3  Pain Location: Chest  Pain Orientation: Left  Pain Descriptors: Heaviness  Pain Type: Acute pain  Pain Frequency: Continuous    C-SSRS:   Risk of Suicide: No Risk    Sepsis Screening:       [place] Fall Risk:       Swallow Screening        Preferred Language:   English      ALLERGIES     Minocycline, Sulfa antibiotics, Bactrim, Erythromycin, Hydrocodone-acetaminophen, Macrobid [nitrofurantoin], Nortriptyline, and Vicodin [hydrocodone-acetaminophen]    SURGICAL HISTORY       Past Surgical History:   Procedure Laterality Date    BACK SURGERY  05/19/2017    L2-5 Decompression L2-5 posterior fusion and tlif by Dr Larry    BREAST SURGERY  08/30/2004    biopsy, Dr. Napier - [facility]    CARDIAC CATHETERIZATION  02/07/2015    CARDIOVASCULAR STRESS TEST   2014    CATARACT REMOVAL WITH IMPLANT Left 04/25/2016    CATARACT REMOVAL WITH IMPLANT Right 05/09/2016    CERVICAL DISC SURGERY  01/18/2008    C5-6 discectomy, Dr. Diamond -[facility]    COLONOSCOPY  10/02 10/05 11/10    DIAGNOSTIC CARDIAC CATH LAB PROCEDURE  2015    DILATION AND CURETTAGE OF UTERUS  1980    Dr. Lucas, [facility]    HIP ARTHROSCOPY Right 07/17/2014    labrum repair and PSCAS lengthening - Dr. Baxter in [place]    HYSTERECTOMY (CERVIX STATUS UNKNOWN)  08/18/2001    Dr. Colby - [facility]    JOINT REPLACEMENT Right 12/16/2014    TOTAL HIP, DR. ARRIAGA - [place]    JOINT REPLACEMENT Left 10/22/2018    LUMBAR DISC SURGERY  05/2000    L3-5 discectomy, Dr. Perkins, [facility]    LUMBAR FUSION  01/2001    L3-4 with cage, Dr. Perkins - [facility]    LUMBAR SPINE SURGERY  08/23/2016    radiofrequency ablation right SI joint - Dr. Connor    NERVE BLOCK Right 08/23/2016    right SI joint    ROTATOR CUFF REPAIR Right 04/23/2012    Dr. Merle NICOLE    SHOULDER SURGERY Right 1972    Dr. Vernon    SHOULDER SURGERY Left 09/22/2014    Dr. Merle NICOLE    TONSILLECTOMY AND ADENOIDECTOMY  1954    [facility]    TOTAL HIP ARTHROPLASTY Left 12/11/2017    LEFT TOTAL HIP ARTHROPLASTY performed by Kegean Arriaga MD at [facility] OR    TUBAL LIGATION  1983    URETER STENT PLACEMENT  02/23/2016    [facility]  D/T ureteropelvic junction obstruction, hydronephrosis       PAST MEDICAL HISTORY       Past Medical History:   Diagnosis Date    CAD (coronary artery disease)     Cervical radiculopathy     Degenerative arthritis of cervical spine     Degenerative lumbar disc     Fibromyalgia     GERD (gastroesophageal reflux disease)     Hydronephrosis 02/11/2016    right kidney    Hyperlipidemia     Hypertension     Hypokalemia     Hypomagnesemia     Osteoarthritis     neck,hands    Polymyalgia rheumatica (HCC)     Rheumatoid arthritis (HCC)     Spinal stenosis     UPJ (ureteropelvic junction) obstruction 06/22/2002    Vitreous detachment of left eye 1996    Vitreous

## 2024-07-29 NOTE — PLAN OF CARE
Problem: Discharge Planning  Goal: Discharge to home or other facility with appropriate resources  Outcome: Progressing  Flowsheets  Taken 7/29/2024 1030 by Brigitte Acevedo RN  Discharge to home or other facility with appropriate resources: Identify barriers to discharge with patient and caregiver  Taken 7/28/2024 2106 by Ceci López RN  Discharge to home or other facility with appropriate resources:   Identify barriers to discharge with patient and caregiver   Identify discharge learning needs (meds, wound care, etc)   Refer to discharge planning if patient needs post-hospital services based on physician order or complex needs related to functional status, cognitive ability or social support system     Problem: Pain  Goal: Verbalizes/displays adequate comfort level or baseline comfort level  7/29/2024 1030 by Brigitte Acevedo RN  Outcome: Progressing  Flowsheets (Taken 7/29/2024 1030)  Verbalizes/displays adequate comfort level or baseline comfort level: Encourage patient to monitor pain and request assistance  Note: Ongoing assessment & interventions provided throughout shift.  Reminded patient to report any pain, pressure, or shortness of breath to the nurse.  Pain medications provided per physician's orders.      Problem: Cardiovascular - Adult  Goal: Maintains optimal cardiac output and hemodynamic stability  Outcome: Progressing  Flowsheets (Taken 7/29/2024 1030)  Maintains optimal cardiac output and hemodynamic stability: Monitor blood pressure and heart rate  Note: Ongoing assessment & interventions provided throughout shift.  Patient on continuous telemetry monitoring, heart tones, vitals & pulses checked at least 3 times per shift & PRN. Vitals within acceptable limits.  Peripheral pulses palpable.      Problem: Cardiovascular - Adult  Goal: Absence of cardiac dysrhythmias or at baseline  Outcome: Progressing  Flowsheets (Taken 7/29/2024 1030)  Absence of cardiac dysrhythmias or at baseline: Monitor

## 2024-07-29 NOTE — CARE COORDINATION
Social/Functional History   Active  Yes   Discharge Planning   Type of Residence House   Living Arrangements Spouse/Significant Other   Current Services Prior To Admission Durable Medical Equipment   Current DME Prior to Arrival [employer]  (Has at home, does not use currently)   Potential Assistance Needed N/A   DME Ordered? No   Potential Assistance Purchasing Medications No   Type of Home Care Services None   Patient expects to be discharged to: House   Services At/After Discharge   Mode of Transport at Discharge Other (see comment)  (family)   Confirm Follow Up Transport Self   Condition of Participation: Discharge Planning   The Plan for Transition of Care is related to the following treatment goals: \"relieve chest pressure\"

## 2024-07-29 NOTE — PROGRESS NOTES
Pharmacy Medication History Note      List of current medications patient is taking is complete.    Source of information: Patient and Surescript    Changes made to medication list:  Medications removed (include reason, ex. therapy complete or physician discontinued):  N/A    Medications added/doses adjusted:  Added  N/A  Adjusted  Cholecalciferol 50 mcg- added directions of take 1 capsule once daily    Other notes (ex. Recent course of antibiotics, Coumadin dosing):  Nitroglycerin prescription . Will need new prescription at discharge.   Denies use of other OTC or herbal medications.    Allergies reviewed    Electronically signed by Amada Gunderson on 2024 at 2:26 PM

## 2024-07-30 VITALS
HEIGHT: 66 IN | DIASTOLIC BLOOD PRESSURE: 70 MMHG | BODY MASS INDEX: 27.92 KG/M2 | HEART RATE: 67 BPM | TEMPERATURE: 98 F | SYSTOLIC BLOOD PRESSURE: 140 MMHG | WEIGHT: 173.7 LBS | OXYGEN SATURATION: 99 % | RESPIRATION RATE: 18 BRPM

## 2024-07-30 PROBLEM — I20.0 UNSTABLE ANGINA (HCC): Status: RESOLVED | Noted: 2024-07-28 | Resolved: 2024-07-30

## 2024-07-30 LAB
ANION GAP SERPL CALC-SCNC: 12 MEQ/L (ref 8–16)
BUN SERPL-MCNC: 21 MG/DL (ref 7–22)
CALCIUM SERPL-MCNC: 9 MG/DL (ref 8.5–10.5)
CHLORIDE SERPL-SCNC: 102 MEQ/L (ref 98–111)
CO2 SERPL-SCNC: 24 MEQ/L (ref 23–33)
CREAT SERPL-MCNC: 1 MG/DL (ref 0.4–1.2)
DEPRECATED RDW RBC AUTO: 44.5 FL (ref 35–45)
ERYTHROCYTE [DISTWIDTH] IN BLOOD BY AUTOMATED COUNT: 12.9 % (ref 11.5–14.5)
GFR SERPL CREATININE-BSD FRML MDRD: 59 ML/MIN/1.73M2
GLUCOSE SERPL-MCNC: 126 MG/DL (ref 70–108)
HCT VFR BLD AUTO: 34.9 % (ref 37–47)
HGB BLD-MCNC: 11.4 GM/DL (ref 12–16)
MAGNESIUM SERPL-MCNC: 1.6 MG/DL (ref 1.6–2.4)
MCH RBC QN AUTO: 31.1 PG (ref 26–33)
MCHC RBC AUTO-ENTMCNC: 32.7 GM/DL (ref 32.2–35.5)
MCV RBC AUTO: 95.4 FL (ref 81–99)
PLATELET # BLD AUTO: 209 THOU/MM3 (ref 130–400)
PMV BLD AUTO: 9.1 FL (ref 9.4–12.4)
POTASSIUM SERPL-SCNC: 3.3 MEQ/L (ref 3.5–5.2)
RBC # BLD AUTO: 3.66 MILL/MM3 (ref 4.2–5.4)
SODIUM SERPL-SCNC: 138 MEQ/L (ref 135–145)
WBC # BLD AUTO: 7.7 THOU/MM3 (ref 4.8–10.8)

## 2024-07-30 PROCEDURE — 83735 ASSAY OF MAGNESIUM: CPT

## 2024-07-30 PROCEDURE — 80048 BASIC METABOLIC PNL TOTAL CA: CPT

## 2024-07-30 PROCEDURE — 36415 COLL VENOUS BLD VENIPUNCTURE: CPT

## 2024-07-30 PROCEDURE — 6370000000 HC RX 637 (ALT 250 FOR IP): Performed by: PHYSICIAN ASSISTANT

## 2024-07-30 PROCEDURE — 99239 HOSP IP/OBS DSCHRG MGMT >30: CPT | Performed by: INTERNAL MEDICINE

## 2024-07-30 PROCEDURE — 6370000000 HC RX 637 (ALT 250 FOR IP): Performed by: INTERNAL MEDICINE

## 2024-07-30 PROCEDURE — 85027 COMPLETE CBC AUTOMATED: CPT

## 2024-07-30 PROCEDURE — 2580000003 HC RX 258: Performed by: INTERNAL MEDICINE

## 2024-07-30 PROCEDURE — 6360000002 HC RX W HCPCS: Performed by: PHYSICIAN ASSISTANT

## 2024-07-30 RX ORDER — ATORVASTATIN CALCIUM 40 MG/1
40 TABLET, FILM COATED ORAL DAILY
Qty: 90 EACH | Refills: 0 | Status: SHIPPED | OUTPATIENT
Start: 2024-07-30

## 2024-07-30 RX ADMIN — MAGNESIUM SULFATE HEPTAHYDRATE 2000 MG: 40 INJECTION, SOLUTION INTRAVENOUS at 05:52

## 2024-07-30 RX ADMIN — POTASSIUM CHLORIDE 40 MEQ: 750 TABLET, FILM COATED, EXTENDED RELEASE ORAL at 05:56

## 2024-07-30 RX ADMIN — SODIUM CHLORIDE, PRESERVATIVE FREE 10 ML: 5 INJECTION INTRAVENOUS at 08:57

## 2024-07-30 RX ADMIN — ATENOLOL AND CHLORTHALIDONE TABLET 1 TABLET: 50; 25 TABLET ORAL at 08:57

## 2024-07-30 RX ADMIN — ASPIRIN 81 MG: 81 TABLET, COATED ORAL at 08:56

## 2024-07-30 RX ADMIN — AMLODIPINE BESYLATE 5 MG: 5 TABLET ORAL at 08:57

## 2024-07-30 RX ADMIN — OMEPRAZOLE 20 MG: 20 CAPSULE, DELAYED RELEASE ORAL at 05:56

## 2024-07-30 NOTE — DISCHARGE SUMMARY
Hospitalist Discharge Summary        Patient: Rose Marie Friedman  YOB: 1950  MRN: [id number]   Acct: [id number]    Primary Care Physician: Marina Campos MD    Admit date  7/28/2024    Discharge date: 7/30/2024     Chief Complaint on presentation :-  chest pain    Discharge Assessment and Plan:-   History of nonobstructive coronary disease  Hypertension, hyperlipidemia  Patient presenting with left sided chest pain, with radiation to LUE and jaw. Occurred at rest. Resolved on own. Has strong fhx of CAD/MI. Had cath years ago showing nonobstructive disease. Minimally elevated trop on presentation, though ECG with new changes, TWI in inferior leads, as well as V3 - 5.  No anginal symptoms currently  Cardiology consulted, appreciate recommendations  Continue ASA, atorvastatin 80mg daily  TTE with normal EF and no WMA  LHC with patent coronaries  Resolution of sypmtoms prior to d/c  F/u with PCP for noncardiac chest pain  Continue norvasc, atenolol-chlorthalidone    Admission H and P  73-year-old female presents emergency department with left chest pressure, left upper arm pain, and left upper back pain.  Patient states the pain started 1 day prior with exertion.  Today the patient has had persistent pressure even with rest.  Patient also reports generally not feeling well -loss of appetite, early satiety with eating, low-grade nausea.     In the emergency department patient's initial troponins are found to be a flat trend of 15 and then 13.  Patient is noted to have some new T wave inversions.     Patient will be admitted for further evaluation of unstable angina.  Patient will be started on a heparin drip with a nitroglycerin trial    Brief Hospital Course  Pt with pmh nonobstructive CAD presenting for evaluation of substernal chest pain. Started on ACS protocol. Evaluated by cardiology. Went for LHC, evidence of patent coronaries. TTE without WMA, normal EF. Discharged home in stable condition.      Day of Discharge Physical Exam:-  Vitals:   Patient Vitals for the past 24 hrs:   BP Temp Temp src Pulse Resp SpO2 Weight   07/30/24 0320 -- -- -- -- -- -- 78.8 kg (173 lb 11.2 oz)   07/30/24 0315 106/64 97.9 °F (36.6 °C) Oral 65 16 99 % --   07/29/24 2325 130/60 98.6 °F (37 °C) Oral 74 14 97 % --   07/29/24 2205 122/60 -- -- 65 15 94 % --   07/29/24 2105 126/64 -- -- 67 16 97 % --   07/29/24 2005 115/61 98.6 °F (37 °C) Oral 71 15 96 % --   07/29/24 1905 109/63 -- -- 69 16 96 % --   07/29/24 1805 135/84 -- -- 70 18 97 % --   07/29/24 1735 115/61 -- -- 65 18 96 % --   07/29/24 1705 118/89 -- -- 78 16 95 % --   07/29/24 1650 120/66 -- -- 60 16 97 % --   07/29/24 1635 114/64 -- -- 62 16 98 % --   07/29/24 1620 124/62 -- -- 59 16 97 % --   07/29/24 1609 -- -- -- -- 16 -- --   07/29/24 1605 119/63 -- -- 62 16 97 % --   07/29/24 1226 137/77 98 °F (36.7 °C) Oral 65 18 98 % --   07/29/24 0837 133/66 97.9 °F (36.6 °C) Oral 70 -- 98 % --     Weight:   Weight - Scale: 78.8 kg (173 lb 11.2 oz)   24 hour intake/output:     Intake/Output Summary (Last 24 hours) at 7/30/2024 0741  Last data filed at 7/30/2024 0315  Gross per 24 hour   Intake 600 ml   Output 5 ml   Net 595 ml       Physical Exam  Vitals and nursing note reviewed.   Constitutional:       General: She is not in acute distress.     Appearance: Normal appearance.   HENT:      Head: Normocephalic and atraumatic.      Mouth/Throat:      Mouth: Mucous membranes are moist.   Eyes:      Conjunctiva/sclera: Conjunctivae normal.   Cardiovascular:      Rate and Rhythm: Normal rate and regular rhythm.      Heart sounds: Normal heart sounds. No murmur heard.  Pulmonary:      Effort: Pulmonary effort is normal. No respiratory distress.      Breath sounds: Normal breath sounds.   Abdominal:      General: Bowel sounds are normal. There is no distension.      Palpations: Abdomen is soft.      Tenderness: There is no abdominal tenderness.   Musculoskeletal:         General: No

## 2024-07-30 NOTE — PROGRESS NOTES
AVS and post radial cath restrictions reviewed with patient. All questions and concerns addressed. Patient has medication from outpatient pharmacy. Patient discharged in stable condition per wheelchair with tech.   Fall risk screening completed. Fall risk bracelet applied to patient. Non-skid socks provided and placed on patient. The fall risk is indicated using  dome light . Based on score, a bed alarm was not indicated. The call light is within the patient's reach, and instructions for use were provided. The bed is in the lowest position with wheels locked. The patient has been advised to notify staff, using the call light, if there is a need to get up or use restroom. The patient verbalized understanding of safety precautions and how to contact staff for assistance.         Joanie Mar RN  05/07/19 [id number]

## 2024-08-02 DIAGNOSIS — E87.6 HYPOKALEMIA: ICD-10-CM

## 2024-08-02 DIAGNOSIS — I10 ESSENTIAL HYPERTENSION: ICD-10-CM

## 2024-08-05 RX ORDER — AMLODIPINE BESYLATE 5 MG/1
5 TABLET ORAL DAILY
Qty: 90 TABLET | Refills: 0 | Status: SHIPPED | OUTPATIENT
Start: 2024-08-05

## 2024-08-05 RX ORDER — POTASSIUM CHLORIDE 750 MG/1
TABLET, EXTENDED RELEASE ORAL
Qty: 180 TABLET | Refills: 1 | Status: SHIPPED | OUTPATIENT
Start: 2024-08-05

## 2024-08-05 NOTE — TELEPHONE ENCOUNTER
Date of last visit:  6/19/2024  Date of next visit:  8/7/2024    Requested Prescriptions     Pending Prescriptions Disp Refills    amLODIPine (NORVASC) 5 MG tablet [Pharmacy Med Name: amLODIPine Besylate 5 MG Oral Tablet] 90 tablet 1     Sig: TAKE 1 TABLET BY MOUTH DAILY    potassium chloride (KLOR-CON M) 10 MEQ extended release tablet [Pharmacy Med Name: Potassium Chloride Amber ER 10 MEQ Oral Tablet Extended Release] 180 tablet 1     Sig: TAKE 1 TABLET BY MOUTH TWICE  DAILY

## 2024-08-07 ENCOUNTER — OFFICE VISIT (OUTPATIENT)
Dept: FAMILY MEDICINE CLINIC | Age: 74
End: 2024-08-07

## 2024-08-07 VITALS
WEIGHT: 174.5 LBS | HEIGHT: 66 IN | RESPIRATION RATE: 16 BRPM | DIASTOLIC BLOOD PRESSURE: 74 MMHG | HEART RATE: 64 BPM | BODY MASS INDEX: 28.04 KG/M2 | SYSTOLIC BLOOD PRESSURE: 112 MMHG

## 2024-08-07 DIAGNOSIS — I25.10 CORONARY ARTERY DISEASE INVOLVING NATIVE CORONARY ARTERY OF NATIVE HEART WITHOUT ANGINA PECTORIS: ICD-10-CM

## 2024-08-07 DIAGNOSIS — E83.42 HYPOMAGNESEMIA: ICD-10-CM

## 2024-08-07 DIAGNOSIS — K21.9 GASTROESOPHAGEAL REFLUX DISEASE, UNSPECIFIED WHETHER ESOPHAGITIS PRESENT: ICD-10-CM

## 2024-08-07 DIAGNOSIS — I10 ESSENTIAL HYPERTENSION: Primary | ICD-10-CM

## 2024-08-07 DIAGNOSIS — M15.9 PRIMARY OSTEOARTHRITIS INVOLVING MULTIPLE JOINTS: ICD-10-CM

## 2024-08-07 NOTE — PROGRESS NOTES
Post-Discharge Transitional Care  Follow Up      Rose Marie Friedman   YOB: 1950    Date of Office Visit:  8/7/2024  Date of Hospital Admission: 7/28/24  Date of Hospital Discharge: 7/30/24  Risk of hospital readmission (high >=14%. Medium >=10%) :Readmission Risk Score: 5.3      Care management risk score Rising risk (score 2-5) and Complex Care (Scores >=6): No Risk Score On File     Non face to face  following discharge, date last encounter closed (first attempt may have been earlier): *No documented post hospital discharge outreach found in the last 14 days    Call initiated 2 business days of discharge: *No response recorded in the last 14 days    ASSESSMENT/PLAN:   Essential hypertension  Coronary artery disease involving native coronary artery of native heart without angina pectoris  Gastroesophageal reflux disease, unspecified whether esophagitis present  Hypomagnesemia  Primary osteoarthritis involving multiple joints      Medical Decision Making: moderate complexity  Return in about 4 months (around 12/7/2024), or if symptoms worsen or fail to improve.           Subjective:   HPI:  Follow up of Hospital problems/diagnosis(es): s that on Saturday 7/27/24 she developed a chest pressure, jaw pain ( she states that she has arthritis and she does have pain on and off on her jaw), and not feeling well. She decided to go to ER to be evaluated.  She was seen by cardiology as her troponins were mildly elevated and her EKG just shows some changes.  She had a stress test that was abnormal and had a cardiac cath that showed nonobstructive coronary artery disease.  She says she has been okay since discharge.  Has a follow-up appointment with cardiologist in the near future.    Inpatient course: Discharge summary reviewed- see chart.    Interval history/Current status: She been doing well no recurrence of her chest tightness or pressure.    Patient Active Problem List   Diagnosis    Hypertension

## 2024-08-19 ENCOUNTER — OFFICE VISIT (OUTPATIENT)
Dept: CARDIOLOGY CLINIC | Age: 74
End: 2024-08-19
Payer: MEDICARE

## 2024-08-19 VITALS
HEART RATE: 72 BPM | SYSTOLIC BLOOD PRESSURE: 142 MMHG | HEIGHT: 66 IN | WEIGHT: 173 LBS | DIASTOLIC BLOOD PRESSURE: 80 MMHG | BODY MASS INDEX: 27.8 KG/M2

## 2024-08-19 DIAGNOSIS — E78.01 FAMILIAL HYPERCHOLESTEROLEMIA: ICD-10-CM

## 2024-08-19 DIAGNOSIS — I10 PRIMARY HYPERTENSION: Primary | ICD-10-CM

## 2024-08-19 PROCEDURE — 3079F DIAST BP 80-89 MM HG: CPT | Performed by: NUCLEAR MEDICINE

## 2024-08-19 PROCEDURE — G8417 CALC BMI ABV UP PARAM F/U: HCPCS | Performed by: NUCLEAR MEDICINE

## 2024-08-19 PROCEDURE — 3077F SYST BP >= 140 MM HG: CPT | Performed by: NUCLEAR MEDICINE

## 2024-08-19 PROCEDURE — 1123F ACP DISCUSS/DSCN MKR DOCD: CPT | Performed by: NUCLEAR MEDICINE

## 2024-08-19 PROCEDURE — 3017F COLORECTAL CA SCREEN DOC REV: CPT | Performed by: NUCLEAR MEDICINE

## 2024-08-19 PROCEDURE — 1090F PRES/ABSN URINE INCON ASSESS: CPT | Performed by: NUCLEAR MEDICINE

## 2024-08-19 PROCEDURE — G8399 PT W/DXA RESULTS DOCUMENT: HCPCS | Performed by: NUCLEAR MEDICINE

## 2024-08-19 PROCEDURE — 1111F DSCHRG MED/CURRENT MED MERGE: CPT | Performed by: NUCLEAR MEDICINE

## 2024-08-19 PROCEDURE — G8428 CUR MEDS NOT DOCUMENT: HCPCS | Performed by: NUCLEAR MEDICINE

## 2024-08-19 PROCEDURE — 99213 OFFICE O/P EST LOW 20 MIN: CPT | Performed by: NUCLEAR MEDICINE

## 2024-08-19 PROCEDURE — 1036F TOBACCO NON-USER: CPT | Performed by: NUCLEAR MEDICINE

## 2024-08-19 NOTE — PROGRESS NOTES
you for allowing me to participate in the care of your patient. Please don't hesitate to contact me regarding any further issues related to the patient care    Orders Placed:  No orders of the defined types were placed in this encounter.      Prescribed:  No orders of the defined types were placed in this encounter.         Discussed use, benefit, and side effects of prescribed medications. All patient questions answered. Pt voicedunderstanding. Instructed to continue current medications, diet and exercise. Continue risk factor modification and medical management. Patient agreed with treatment plan. Follow up as directed.    Electronically signedby Polly Mathew MD on 8/19/2024 at 11:35 AM

## 2024-10-04 DIAGNOSIS — I10 ESSENTIAL HYPERTENSION: ICD-10-CM

## 2024-10-07 RX ORDER — AMLODIPINE BESYLATE 5 MG/1
5 TABLET ORAL DAILY
Qty: 90 TABLET | Refills: 1 | Status: SHIPPED | OUTPATIENT
Start: 2024-10-07

## 2024-10-07 NOTE — TELEPHONE ENCOUNTER
The pharmacy is requesting a refill of the below medication which has been pended for you:     Requested Prescriptions     Pending Prescriptions Disp Refills    amLODIPine (NORVASC) 5 MG tablet [Pharmacy Med Name: amLODIPine Besylate 5 MG Oral Tablet] 90 tablet 1     Sig: TAKE 1 TABLET BY MOUTH DAILY       Last Appointment Date: 8/7/2024  Next Appointment Date: 10/18/2024    Allergies   Allergen Reactions    Minocycline Swelling     Other reaction(s): 3464587    Sulfa Antibiotics Swelling     Other reaction(s): swelling    Bactrim Diarrhea    Erythromycin Diarrhea    Hydrocodone-Acetaminophen Other (See Comments)     Headache  Other reaction(s): bad headaches  Other reaction(s): 71197802    Macrobid [Nitrofurantoin] Other (See Comments)     She got a HA, upset stomach and decreased appetite.     Nortriptyline Itching     Other reaction(s): 64971228    Vicodin [Hydrocodone-Acetaminophen] Other (See Comments)     Headache.  severe

## 2024-10-18 ENCOUNTER — OFFICE VISIT (OUTPATIENT)
Dept: FAMILY MEDICINE CLINIC | Age: 74
End: 2024-10-18

## 2024-10-18 VITALS
HEART RATE: 76 BPM | WEIGHT: 175.5 LBS | SYSTOLIC BLOOD PRESSURE: 130 MMHG | RESPIRATION RATE: 14 BRPM | BODY MASS INDEX: 28.21 KG/M2 | DIASTOLIC BLOOD PRESSURE: 80 MMHG | HEIGHT: 66 IN

## 2024-10-18 DIAGNOSIS — M15.0 PRIMARY OSTEOARTHRITIS INVOLVING MULTIPLE JOINTS: ICD-10-CM

## 2024-10-18 DIAGNOSIS — I10 ESSENTIAL HYPERTENSION: Primary | ICD-10-CM

## 2024-10-18 DIAGNOSIS — I25.10 CORONARY ARTERY DISEASE INVOLVING NATIVE CORONARY ARTERY OF NATIVE HEART WITHOUT ANGINA PECTORIS: ICD-10-CM

## 2024-10-18 DIAGNOSIS — K21.9 GASTROESOPHAGEAL REFLUX DISEASE, UNSPECIFIED WHETHER ESOPHAGITIS PRESENT: ICD-10-CM

## 2024-10-18 DIAGNOSIS — E87.6 HYPOKALEMIA: ICD-10-CM

## 2024-10-18 PROCEDURE — 1036F TOBACCO NON-USER: CPT | Performed by: FAMILY MEDICINE

## 2024-10-18 PROCEDURE — G8427 DOCREV CUR MEDS BY ELIG CLIN: HCPCS | Performed by: FAMILY MEDICINE

## 2024-10-18 PROCEDURE — 1123F ACP DISCUSS/DSCN MKR DOCD: CPT | Performed by: FAMILY MEDICINE

## 2024-10-18 PROCEDURE — 1090F PRES/ABSN URINE INCON ASSESS: CPT | Performed by: FAMILY MEDICINE

## 2024-10-18 PROCEDURE — G8417 CALC BMI ABV UP PARAM F/U: HCPCS | Performed by: FAMILY MEDICINE

## 2024-10-18 PROCEDURE — 99213 OFFICE O/P EST LOW 20 MIN: CPT | Performed by: FAMILY MEDICINE

## 2024-10-18 PROCEDURE — G8399 PT W/DXA RESULTS DOCUMENT: HCPCS | Performed by: FAMILY MEDICINE

## 2024-10-18 PROCEDURE — 3017F COLORECTAL CA SCREEN DOC REV: CPT | Performed by: FAMILY MEDICINE

## 2024-10-18 RX ORDER — HYDROXYCHLOROQUINE SULFATE 200 MG/1
200 TABLET, FILM COATED ORAL 2 TIMES DAILY
COMMUNITY

## 2024-10-18 NOTE — PROGRESS NOTES
Date: 10/18/2024    Rose Marie Friedman is a 74 y.o. female who presents today for:  Chief Complaint   Patient presents with    Hypertension    Gastroesophageal Reflux stable  She follows with Drs Sonali, Tiffanie, Milind, Pedrito, Allyson for her eye [facility], [facility], Jennifer urology at [facility],  [place]       HPI:     Hypertension  This is a chronic problem. The current episode started more than 1 year ago. The problem is unchanged. The problem is controlled. Pertinent negatives include no chest pain, headaches, orthopnea, palpitations, peripheral edema, PND or shortness of breath. Risk factors for coronary artery disease include dyslipidemia and post-menopausal state. Past treatments include calcium channel blockers, beta blockers and diuretics. The current treatment provides significant improvement. There are no compliance problems.    Gastroesophageal Reflux  She reports no abdominal pain, no chest pain, no coughing, no early satiety, no globus sensation, no nausea or no wheezing. This is a chronic problem. The current episode started more than 1 year ago. The problem occurs rarely. The problem has been unchanged. Pertinent negatives include no fatigue, muscle weakness, orthopnea or weight loss. She has tried a PPI and a diet change for the symptoms. The treatment provided significant relief.   Hyperlipidemia  This is a chronic problem. The current episode started more than 1 year ago. The problem is controlled. Recent lipid tests were reviewed and are normal. Pertinent negatives include no chest pain, focal sensory loss, focal weakness, leg pain, myalgias or shortness of breath. Current antihyperlipidemic treatment includes statins and diet change. The current treatment provides significant improvement of lipids. There are no compliance problems.  Risk factors for coronary artery disease include dyslipidemia, hypertension and post-menopausal.       has a current medication

## 2024-10-22 ENCOUNTER — TELEPHONE (OUTPATIENT)
Dept: FAMILY MEDICINE CLINIC | Age: 74
End: 2024-10-22

## 2024-10-22 LAB — POTASSIUM SERPL-SCNC: 4.6 MMOL/L (ref 3.5–5.4)

## 2024-10-22 NOTE — PROGRESS NOTES
The overall EFW is at the 9th percentile.  The AUGUSTO is normal.  Umbilical artery Dopplers are normal.  No absent or reversed end-diastolic flow.  Recommend MFM consultation for reevaluation and recommendations.  Both the mother and father are smaller individuals, I suspect this is likely constitutional in nature, however we discussed the need for diligence in the setting of possible IUGR.  
The patient's anatomy ultrasound previously showed normal cardiac anatomy.  On today's study the moderator band appears thickened.  Recommend MFM referral and evaluation of the cardiac anatomy  
07/05/2023 08:58 AM    LABGLOM >60 06/27/2023 10:25 AM    GLUCOSE 99 07/05/2023 08:58 AM    GLUCOSE 99 07/05/2023 08:58 AM    CALCIUM 10.9 07/05/2023 08:58 AM    CALCIUM 10.6 07/05/2023 08:58 AM       Hepatic Function Panel:    Lab Results   Component Value Date/Time    ALKPHOS 71 06/27/2023 10:25 AM    ALT 23 06/27/2023 10:25 AM    AST 22 06/27/2023 10:25 AM    PROT 6.8 06/27/2023 10:25 AM    BILITOT 0.3 06/27/2023 10:25 AM    BILITOT NEGATIVE 09/23/2022 03:31 PM    BILIDIR <0.2 06/27/2023 10:25 AM    LABALBU 3.8 06/27/2023 10:25 AM       Magnesium:    Lab Results   Component Value Date/Time    MG 1.7 07/05/2023 08:58 AM       PT/INR:    Lab Results   Component Value Date/Time    INR 1.01 11/20/2017 09:28 AM       HgBA1c:    Lab Results   Component Value Date/Time    LABA1C 5.6 07/22/2019 08:38 AM       FLP:    Lab Results   Component Value Date/Time    TRIG 84 08/23/2021 06:48 AM    HDL 56 08/23/2021 06:48 AM    LDLCALC 87 08/23/2021 06:48 AM    LABVLDL 23 01/08/2020 08:37 AM       TSH:    Lab Results   Component Value Date/Time    TSH 1.56 11/20/2019 09:52 AM        Diagnosis Orders   1. Coronary artery disease involving native coronary artery of native heart without angina pectoris  80149 - SD ELECTROCARDIOGRAM, COMPLETE    CBC    Basic Metabolic Panel    Lipid Panel    Hepatic Function Panel      2. Dyslipidemia (high LDL; low HDL)  CBC    Basic Metabolic Panel    Lipid Panel    Hepatic Function Panel           Assessment/Plan    Is here for a follow-up. She is 67years old lady she had a history of coronary artery disease documented by heart cath back in 2015 at the time she had about 50% narrowing at the ostium of the diagonal artery back in 21 she had a stress test showed mild apical ischemia.   The patient did had a stress test again in June of this year and again showed mild apical ischemia she denies any more chest pain she has been physically active she has a chronic nonspecific ST segment change her EKG

## 2024-10-22 NOTE — TELEPHONE ENCOUNTER
----- Message from Dr. Matheus Escudero MD sent at 10/22/2024  5:10 PM EDT -----  Please call patient her potassium is 4.6 which is normal continue current medication treatment

## 2024-11-14 NOTE — TELEPHONE ENCOUNTER
The pharmacy is  requesting a refill of the below medication which has been pended for you:     Requested Prescriptions     Pending Prescriptions Disp Refills    omeprazole (PRILOSEC) 20 MG delayed release capsule [Pharmacy Med Name: Omeprazole 20 MG Oral Capsule Delayed Release] 90 capsule 1     Sig: TAKE 1 CAPSULE BY MOUTH DAILY       Last Appointment Date: 10/18/2024  Next Appointment Date: 2/19/2025    Allergies   Allergen Reactions    Minocycline Swelling     Other reaction(s): 7042925    Sulfa Antibiotics Swelling     Other reaction(s): swelling    Bactrim Diarrhea    Erythromycin Diarrhea    Hydrocodone-Acetaminophen Other (See Comments)     Headache  Other reaction(s): bad headaches  Other reaction(s): 36733945    Macrobid [Nitrofurantoin] Other (See Comments)     She got a HA, upset stomach and decreased appetite.     Nortriptyline Itching     Other reaction(s): 16959622    Vicodin [Hydrocodone-Acetaminophen] Other (See Comments)     Headache.  severe

## 2024-12-02 DIAGNOSIS — I10 ESSENTIAL HYPERTENSION: ICD-10-CM

## 2024-12-03 RX ORDER — ATENOLOL AND CHLORTHALIDONE TABLET 50; 25 MG/1; MG/1
TABLET ORAL
Qty: 180 TABLET | Refills: 1 | Status: SHIPPED | OUTPATIENT
Start: 2024-12-03

## 2024-12-03 NOTE — TELEPHONE ENCOUNTER
The pharmacy is requesting a refill of the below medication which has been pended for you:     Requested Prescriptions     Pending Prescriptions Disp Refills    atenolol-chlorthalidone (TENORETIC) 50-25 MG per tablet [Pharmacy Med Name: Atenolol-Chlorthalidone 50-25 MG Oral Tablet] 180 tablet 1     Sig: TAKE 1 TABLET BY MOUTH TWICE  DAILY       Last Appointment Date: 10/18/2024  Next Appointment Date: 2/19/2025    Allergies   Allergen Reactions    Minocycline Swelling     Other reaction(s): 4614483    Sulfa Antibiotics Swelling     Other reaction(s): swelling    Bactrim Diarrhea    Erythromycin Diarrhea    Hydrocodone-Acetaminophen Other (See Comments)     Headache  Other reaction(s): bad headaches  Other reaction(s): 74075330    Macrobid [Nitrofurantoin] Other (See Comments)     She got a HA, upset stomach and decreased appetite.     Nortriptyline Itching     Other reaction(s): [id number]    Vicodin [Hydrocodone-Acetaminophen] Other (See Comments)     Headache.  severe

## 2024-12-10 ASSESSMENT — ENCOUNTER SYMPTOMS
WHEEZING: 0
GLOBUS SENSATION: 0
ORTHOPNEA: 0

## 2024-12-23 DIAGNOSIS — E87.6 HYPOKALEMIA: ICD-10-CM

## 2024-12-24 RX ORDER — POTASSIUM CHLORIDE 750 MG/1
TABLET, EXTENDED RELEASE ORAL
Qty: 180 TABLET | Refills: 0 | Status: SHIPPED | OUTPATIENT
Start: 2024-12-24

## 2024-12-24 NOTE — TELEPHONE ENCOUNTER
Date of last visit:  10/18/2024  Date of next visit:  2/19/2025    Requested Prescriptions     Pending Prescriptions Disp Refills    potassium chloride (KLOR-CON M) 10 MEQ extended release tablet [Pharmacy Med Name: Potassium Chloride Amber ER 10 MEQ Oral Tablet Extended Release] 180 tablet 0     Sig: TAKE 1 TABLET BY MOUTH TWICE  DAILY

## 2024-12-31 LAB
ANION GAP SERPL CALCULATED.3IONS-SCNC: 13 MMOL/L (ref 7–16)
BUN BLDV-MCNC: 17 MG/DL (ref 8–23)
CALCIUM SERPL-MCNC: 10.4 MG/DL (ref 8.6–10.5)
CHLORIDE BLD-SCNC: 102 MMOL/L (ref 96–107)
CO2: 29 MMOL/L (ref 18–32)
CREAT SERPL-MCNC: 0.92 MG/DL (ref 0.51–1.15)
EGFR IF NONAFRICAN AMERICAN: 65 ML/MIN/1.73M2
GLUCOSE: 105 MG/DL (ref 70–100)
POTASSIUM SERPL-SCNC: 4.3 MMOL/L (ref 3.5–5.4)
SODIUM BLD-SCNC: 144 MMOL/L (ref 135–148)

## 2025-01-06 ENCOUNTER — OFFICE VISIT (OUTPATIENT)
Dept: NEPHROLOGY | Age: 75
End: 2025-01-06

## 2025-01-06 ENCOUNTER — TELEPHONE (OUTPATIENT)
Dept: NEPHROLOGY | Age: 75
End: 2025-01-06

## 2025-01-06 VITALS
HEART RATE: 66 BPM | SYSTOLIC BLOOD PRESSURE: 142 MMHG | WEIGHT: 178.6 LBS | OXYGEN SATURATION: 98 % | BODY MASS INDEX: 28.7 KG/M2 | HEIGHT: 66 IN | DIASTOLIC BLOOD PRESSURE: 80 MMHG

## 2025-01-06 DIAGNOSIS — I10 PRIMARY HYPERTENSION: ICD-10-CM

## 2025-01-06 DIAGNOSIS — E83.42 HYPOMAGNESEMIA: ICD-10-CM

## 2025-01-06 DIAGNOSIS — N18.31 STAGE 3A CHRONIC KIDNEY DISEASE (HCC): Primary | ICD-10-CM

## 2025-01-06 DIAGNOSIS — E83.52 HYPERCALCEMIA: ICD-10-CM

## 2025-01-06 LAB — MAGNESIUM SERPL-MCNC: 1.7 MG/DL (ref 1.6–2.4)

## 2025-01-06 NOTE — PROGRESS NOTES
Renal Progress Note    Assessment and Plan:      Diagnosis Orders   1. Stage 3a chronic kidney disease (HCC)        2. Hypercalcemia        3. Primary hypertension        4. Hypomagnesemia                  PLAN:  Serum creatinine is stable at 0.92 mg/dl  Serum calcium is good at 10.4 mg/dl  Hypertension is stable  Magnesium level is pending  Medications reviewed  No changes  Return visit in 12 months           Patient Active Problem List   Diagnosis    Hypertension    Degenerative arthritis of cervical spine    Osteoarthrosis    Heart palpitations    Primary osteoarthritis of left hip    Gastroesophageal reflux disease    CAD (coronary artery disease)    Primary osteoarthritis involving multiple joints           Subjective:   Chief complaint:  Chief Complaint   Patient presents with    Follow-up     1 year FU hypercalcemia/ hypomagnesemia      HPI:This is a follow up visit for Ms. Rose Marie Friedman here today for return appointment.  I see her for chronic kidney disease among other things.  She was last seen about 4 months ago.   No complaint today.  Appetite is good.  Urinates well with medications.  No recent hospitalization.    ROS:  Pertinent positives stated above in HPI. All other systems were reviewed and were negative.  Medications:     Current Outpatient Medications   Medication Sig Dispense Refill    potassium chloride (KLOR-CON M) 10 MEQ extended release tablet TAKE 1 TABLET BY MOUTH TWICE  DAILY 180 tablet 0    atenolol-chlorthalidone (TENORETIC) 50-25 MG per tablet TAKE 1 TABLET BY MOUTH TWICE  DAILY 180 tablet 1    omeprazole (PRILOSEC) 20 MG delayed release capsule TAKE 1 CAPSULE BY MOUTH DAILY 90 capsule 1    hydroxychloroquine (PLAQUENIL) 200 MG tablet Take 1 tablet by mouth 2 times daily      amLODIPine (NORVASC) 5 MG tablet TAKE 1 TABLET BY MOUTH DAILY 90 tablet 1    magnesium oxide (MAG-OX) 400 MG tablet TAKE 2 TABLETS BY MOUTH IN THE MORNING AND 2 TABLETS AT BEDTIME 120 tablet 3    aspirin EC 81 MG

## 2025-01-06 NOTE — PROGRESS NOTES
She had Cardiac cath 8/2024 and says it was WNL. She still struggles with Neuropathy. She had a steroid injection for her sciatica in November 2024. There was not a magnesium ordered at her last visit.

## 2025-01-06 NOTE — TELEPHONE ENCOUNTER
----- Message from Dr. Salo Major MD sent at 1/6/2025 10:15 AM EST -----  Magnesium level is normal at 1.7

## 2025-01-20 ENCOUNTER — HOSPITAL ENCOUNTER (EMERGENCY)
Age: 75
Discharge: HOME | End: 2025-01-20
Payer: MEDICARE

## 2025-01-20 VITALS
WEIGHT: 175 LBS | HEIGHT: 65 IN | OXYGEN SATURATION: 98 % | BODY MASS INDEX: 29.16 KG/M2 | RESPIRATION RATE: 17 BRPM | HEART RATE: 72 BPM | TEMPERATURE: 98.6 F | SYSTOLIC BLOOD PRESSURE: 124 MMHG | DIASTOLIC BLOOD PRESSURE: 75 MMHG

## 2025-01-20 DIAGNOSIS — J40 BRONCHITIS: Primary | ICD-10-CM

## 2025-01-20 PROCEDURE — 99213 OFFICE O/P EST LOW 20 MIN: CPT

## 2025-01-20 RX ORDER — PREDNISONE 20 MG/1
20 TABLET ORAL 2 TIMES DAILY
Qty: 10 TABLET | Refills: 0 | Status: SHIPPED | OUTPATIENT
Start: 2025-01-20 | End: 2025-01-25

## 2025-01-20 ASSESSMENT — ENCOUNTER SYMPTOMS
SHORTNESS OF BREATH: 0
RHINORRHEA: 0
EYE DISCHARGE: 0
DIARRHEA: 0
EYE REDNESS: 0
NAUSEA: 0
COUGH: 0
TROUBLE SWALLOWING: 0
VOMITING: 0
SORE THROAT: 1

## 2025-01-20 ASSESSMENT — PAIN - FUNCTIONAL ASSESSMENT: PAIN_FUNCTIONAL_ASSESSMENT: 0-10

## 2025-01-20 ASSESSMENT — PAIN SCALES - GENERAL: PAINLEVEL_OUTOF10: 4

## 2025-01-20 NOTE — DISCHARGE INSTRUCTIONS
Prescription for prednisone and Augmentin.  Recommend use of over the counter mucinex and zyrtec. Use over-the-counter Tylenol and Motrin for pain or fever.  Drink plenty of fluids. Rest. Follow-up with PCP in 3 to 5 days worsening symptom.  Seek emergency department if you develop severe shortness of breath.

## 2025-01-20 NOTE — ED PROVIDER NOTES
[facility] URGENT CARE  Urgent Care Encounter      CHIEF COMPLAINT       Chief Complaint   Patient presents with    Ear Pain    Pharyngitis    Neck Pain       Nurses Notes reviewed and I agree except as noted in the HPI.  HISTORY OF PRESENT ILLNESS   Rose Marie Friedman is a 74 y.o. female who presents urgent care for evaluation of ear pain, sore throat and neck pain.  Patient reports onset of symptoms on Saturday.  Reports for her symptoms she has been taking Tylenol, without any improvement.  Reports things are just dragging on and not getting any better at home.  Rating discomfort 4 out of 10.  Reports in addition to ear pain and sore throat she does have cough congestion.  Denies any known fevers.  Does endorse and some chest tightness and audible wheezing.  Reports cough is nonproductive.    REVIEW OF SYSTEMS     Review of Systems   Constitutional:  Negative for chills, diaphoresis, fatigue and fever.   HENT:  Positive for ear pain and sore throat. Negative for congestion, rhinorrhea and trouble swallowing.    Eyes:  Negative for discharge and redness.   Respiratory:  Negative for cough and shortness of breath.    Cardiovascular:  Negative for chest pain.   Gastrointestinal:  Negative for diarrhea, nausea and vomiting.   Genitourinary:  Negative for decreased urine volume.   Musculoskeletal:  Positive for neck pain. Negative for neck stiffness.   Skin:  Negative for rash.   Neurological:  Negative for headaches.   Hematological:  Negative for adenopathy.   Psychiatric/Behavioral:  Negative for sleep disturbance.        PAST MEDICAL HISTORY         Diagnosis Date    CAD (coronary artery disease)     Cervical radiculopathy     Degenerative arthritis of cervical spine     Degenerative lumbar disc     Fibromyalgia     GERD (gastroesophageal reflux disease)     Hydronephrosis 02/11/2016    right kidney    Hyperlipidemia     Hypertension     Hypokalemia     Hypomagnesemia     Osteoarthritis     neck,hands

## 2025-01-20 NOTE — ED TRIAGE NOTES
Pt arrives ambulatory from [facility] with c/o ear pain, sore throat, neck pain and cough since Saturday. Pt states symptoms are not improving at home. Pt states she has been taken tylenol at home for symptoms. Pt states pain is a 4 out of 0-10 at this time.

## 2025-02-28 ENCOUNTER — OFFICE VISIT (OUTPATIENT)
Dept: FAMILY MEDICINE CLINIC | Age: 75
End: 2025-02-28

## 2025-02-28 VITALS
DIASTOLIC BLOOD PRESSURE: 80 MMHG | BODY MASS INDEX: 29.66 KG/M2 | WEIGHT: 178 LBS | HEART RATE: 64 BPM | RESPIRATION RATE: 14 BRPM | HEIGHT: 65 IN | SYSTOLIC BLOOD PRESSURE: 134 MMHG

## 2025-02-28 DIAGNOSIS — E55.9 VITAMIN D DEFICIENCY: ICD-10-CM

## 2025-02-28 DIAGNOSIS — K21.9 GASTROESOPHAGEAL REFLUX DISEASE, UNSPECIFIED WHETHER ESOPHAGITIS PRESENT: ICD-10-CM

## 2025-02-28 DIAGNOSIS — M15.0 PRIMARY OSTEOARTHRITIS INVOLVING MULTIPLE JOINTS: ICD-10-CM

## 2025-02-28 DIAGNOSIS — E87.6 HYPOKALEMIA: ICD-10-CM

## 2025-02-28 DIAGNOSIS — I10 ESSENTIAL HYPERTENSION: Primary | ICD-10-CM

## 2025-02-28 DIAGNOSIS — I10 ESSENTIAL HYPERTENSION: ICD-10-CM

## 2025-02-28 DIAGNOSIS — E83.42 HYPOMAGNESEMIA: ICD-10-CM

## 2025-02-28 PROCEDURE — 1090F PRES/ABSN URINE INCON ASSESS: CPT | Performed by: FAMILY MEDICINE

## 2025-02-28 PROCEDURE — G8399 PT W/DXA RESULTS DOCUMENT: HCPCS | Performed by: FAMILY MEDICINE

## 2025-02-28 PROCEDURE — G8427 DOCREV CUR MEDS BY ELIG CLIN: HCPCS | Performed by: FAMILY MEDICINE

## 2025-02-28 PROCEDURE — 1123F ACP DISCUSS/DSCN MKR DOCD: CPT | Performed by: FAMILY MEDICINE

## 2025-02-28 PROCEDURE — G8417 CALC BMI ABV UP PARAM F/U: HCPCS | Performed by: FAMILY MEDICINE

## 2025-02-28 PROCEDURE — 1036F TOBACCO NON-USER: CPT | Performed by: FAMILY MEDICINE

## 2025-02-28 PROCEDURE — 3017F COLORECTAL CA SCREEN DOC REV: CPT | Performed by: FAMILY MEDICINE

## 2025-02-28 PROCEDURE — 99213 OFFICE O/P EST LOW 20 MIN: CPT | Performed by: FAMILY MEDICINE

## 2025-02-28 RX ORDER — POTASSIUM CHLORIDE 750 MG/1
TABLET, EXTENDED RELEASE ORAL
Qty: 180 TABLET | Refills: 1 | Status: SHIPPED | OUTPATIENT
Start: 2025-02-28

## 2025-02-28 RX ORDER — AMLODIPINE BESYLATE 5 MG/1
5 TABLET ORAL DAILY
Qty: 90 TABLET | Refills: 1 | Status: SHIPPED | OUTPATIENT
Start: 2025-02-28

## 2025-02-28 SDOH — ECONOMIC STABILITY: FOOD INSECURITY: WITHIN THE PAST 12 MONTHS, THE FOOD YOU BOUGHT JUST DIDN'T LAST AND YOU DIDN'T HAVE MONEY TO GET MORE.: NEVER TRUE

## 2025-02-28 SDOH — ECONOMIC STABILITY: FOOD INSECURITY: WITHIN THE PAST 12 MONTHS, YOU WORRIED THAT YOUR FOOD WOULD RUN OUT BEFORE YOU GOT MONEY TO BUY MORE.: NEVER TRUE

## 2025-02-28 ASSESSMENT — PATIENT HEALTH QUESTIONNAIRE - PHQ9
1. LITTLE INTEREST OR PLEASURE IN DOING THINGS: NOT AT ALL
SUM OF ALL RESPONSES TO PHQ QUESTIONS 1-9: 0
2. FEELING DOWN, DEPRESSED OR HOPELESS: NOT AT ALL
SUM OF ALL RESPONSES TO PHQ9 QUESTIONS 1 & 2: 0
SUM OF ALL RESPONSES TO PHQ QUESTIONS 1-9: 0

## 2025-02-28 ASSESSMENT — ENCOUNTER SYMPTOMS
CONSTIPATION: 0
EYES NEGATIVE: 1
NAUSEA: 0
COUGH: 0
ABDOMINAL PAIN: 0
CHEST TIGHTNESS: 0
DIARRHEA: 0
VOMITING: 0
SHORTNESS OF BREATH: 0
BACK PAIN: 1

## 2025-02-28 NOTE — TELEPHONE ENCOUNTER
Date of last visit:  2/28/2025  Date of next visit:  2/28/2025    Requested Prescriptions     Pending Prescriptions Disp Refills    potassium chloride (KLOR-CON M) 10 MEQ extended release tablet [Pharmacy Med Name: Potassium Chloride Amber ER 10 MEQ Oral Tablet Extended Release] 180 tablet 1     Sig: TAKE 1 TABLET BY MOUTH TWICE  DAILY

## 2025-02-28 NOTE — PROGRESS NOTES
80.7 kg (178 lb)   01/20/25 79.4 kg (175 lb)   01/06/25 81 kg (178 lb 9.6 oz)     Physical Exam  Vitals and nursing note reviewed.   Constitutional:       General: She is not in acute distress.     Appearance: She is well-developed. She is not diaphoretic.   HENT:      Head: Normocephalic and atraumatic.   Eyes:      General: No scleral icterus.        Right eye: No discharge.         Left eye: No discharge.      Conjunctiva/sclera: Conjunctivae normal.      Pupils: Pupils are equal, round, and reactive to light.   Neck:      Thyroid: No thyromegaly.      Vascular: No JVD.   Cardiovascular:      Rate and Rhythm: Normal rate and regular rhythm.      Heart sounds: Normal heart sounds. No murmur heard.  Pulmonary:      Effort: No respiratory distress.      Breath sounds: Normal breath sounds. No wheezing, rhonchi or rales.   Abdominal:      General: Bowel sounds are normal. There is no distension.      Palpations: Abdomen is soft. There is no mass.      Tenderness: There is no abdominal tenderness. There is no guarding or rebound.   Musculoskeletal:         General: Normal range of motion.      Cervical back: Normal range of motion and neck supple.   Lymphadenopathy:      Cervical: No cervical adenopathy.   Skin:     General: Skin is warm and dry.      Findings: No rash.   Neurological:      Mental Status: She is alert and oriented to person, place, and time.   Psychiatric:         Behavior: Behavior normal.       :   Assessment & Plan    Diagnosis Orders   1. Essential hypertension  CBC with Auto Differential    Comprehensive Metabolic Panel      2. Gastroesophageal reflux disease, unspecified whether esophagitis present        3. Hypomagnesemia        4. Primary osteoarthritis involving multiple joints        5. Vitamin D deficiency  Vitamin D 25 Hydroxy          :      Requested Prescriptions      No prescriptions requested or ordered in this encounter     Current Outpatient Medications   Medication Sig Dispense

## 2025-02-28 NOTE — TELEPHONE ENCOUNTER
Date of last visit:  10/18/2024  Date of next visit:  2/28/2025    Requested Prescriptions     Pending Prescriptions Disp Refills    amLODIPine (NORVASC) 5 MG tablet [Pharmacy Med Name: amLODIPine Besylate 5 MG Oral Tablet] 90 tablet 1     Sig: TAKE 1 TABLET BY MOUTH DAILY

## 2025-04-09 RX ORDER — OMEPRAZOLE 20 MG/1
20 CAPSULE, DELAYED RELEASE ORAL
Qty: 90 CAPSULE | Refills: 1 | Status: SHIPPED | OUTPATIENT
Start: 2025-04-09

## 2025-04-09 NOTE — TELEPHONE ENCOUNTER
The pharmacy is  requesting a refill of the below medication which has been pended for you:     Requested Prescriptions     Pending Prescriptions Disp Refills    omeprazole (PRILOSEC) 20 MG delayed release capsule [Pharmacy Med Name: Omeprazole 20 MG Oral Capsule Delayed Release] 90 capsule 1     Sig: TAKE 1 CAPSULE BY MOUTH DAILY       Last Appointment Date: 2/28/2025  Next Appointment Date: 6/27/2025    Allergies   Allergen Reactions    Minocycline Swelling     Other reaction(s): 0985615    Sulfa Antibiotics Swelling     Other reaction(s): swelling    Bactrim Diarrhea    Erythromycin Diarrhea    Hydrocodone-Acetaminophen Other (See Comments)     Headache  Other reaction(s): bad headaches  Other reaction(s): 04066146    Macrobid [Nitrofurantoin] Other (See Comments)     She got a HA, upset stomach and decreased appetite.     Nortriptyline Itching     Other reaction(s): 27998296    Vicodin [Hydrocodone-Acetaminophen] Other (See Comments)     Headache.  severe

## 2025-05-19 ENCOUNTER — HOSPITAL ENCOUNTER (OUTPATIENT)
Dept: WOMENS IMAGING | Age: 75
Discharge: HOME | End: 2025-05-19
Payer: MEDICARE

## 2025-05-19 DIAGNOSIS — Z12.31 VISIT FOR SCREENING MAMMOGRAM: ICD-10-CM

## 2025-05-19 PROCEDURE — 77063 BREAST TOMOSYNTHESIS BI: CPT

## 2025-05-23 DIAGNOSIS — I10 ESSENTIAL HYPERTENSION: ICD-10-CM

## 2025-05-27 RX ORDER — ATENOLOL AND CHLORTHALIDONE TABLET 50; 25 MG/1; MG/1
1 TABLET ORAL 2 TIMES DAILY
Qty: 180 TABLET | Refills: 1 | Status: SHIPPED | OUTPATIENT
Start: 2025-05-27

## 2025-05-27 NOTE — TELEPHONE ENCOUNTER
The pharmacy is requesting a refill of the below medication which has been pended for you:     Requested Prescriptions     Pending Prescriptions Disp Refills    atenolol-chlorthalidone (TENORETIC) 50-25 MG per tablet [Pharmacy Med Name: Atenolol-Chlorthalidone 50-25 MG Oral Tablet] 180 tablet 1     Sig: TAKE 1 TABLET BY MOUTH TWICE  DAILY       Last Appointment Date: Visit date not found  Next Appointment Date: Visit date not found    Allergies   Allergen Reactions    Minocycline Swelling     Other reaction(s): [id number]    Sulfa Antibiotics Swelling     Other reaction(s): swelling    Bactrim Diarrhea    Erythromycin Diarrhea    Hydrocodone-Acetaminophen Other (See Comments)     Headache  Other reaction(s): bad headaches  Other reaction(s): 96005140    Macrobid [Nitrofurantoin] Other (See Comments)     She got a HA, upset stomach and decreased appetite.     Nortriptyline Itching     Other reaction(s): [id number]    Vicodin [Hydrocodone-Acetaminophen] Other (See Comments)     Headache.  severe

## 2025-06-20 LAB
ALBUMIN: 4.4 G/DL (ref 3.5–5.2)
ALK PHOSPHATASE: 58 U/L (ref 30–146)
ALT SERPL-CCNC: 28 U/L (ref 5–33)
ANION GAP SERPL CALCULATED.3IONS-SCNC: 11 MMOL/L (ref 7–16)
AST SERPL-CCNC: 34 U/L (ref 9–40)
BASOPHILS ABSOLUTE: 0.05 K/UL (ref 0–0.2)
BASOPHILS RELATIVE PERCENT: 0.6 % (ref 0–2)
BILIRUB SERPL-MCNC: 0.6 MG/DL
BUN BLDV-MCNC: 17 MG/DL (ref 8–23)
CALCIUM SERPL-MCNC: 10.2 MG/DL (ref 8.6–10.5)
CHLORIDE BLD-SCNC: 99 MMOL/L (ref 96–107)
CO2: 31 MMOL/L (ref 18–32)
CREAT SERPL-MCNC: 0.89 MG/DL (ref 0.51–1.15)
EGFR IF NONAFRICAN AMERICAN: 68 ML/MIN/1.73M2
EOSINOPHILS ABSOLUTE: 0.13 K/UL (ref 0–0.8)
EOSINOPHILS RELATIVE PERCENT: 1.6 % (ref 0–5)
GLUCOSE: 96 MG/DL (ref 70–100)
HCT VFR BLD CALC: 39 % (ref 35–47)
HEMOGLOBIN: 12.9 G/DL (ref 11.9–16)
IMMATURE GRANS (ABS): 0.04 K/UL (ref 0–0.06)
IMMATURE GRANULOCYTES %: 0.5 % (ref 0–2)
LYMPHOCYTES ABSOLUTE: 2.75 K/UL (ref 0.9–5.2)
LYMPHOCYTES RELATIVE PERCENT: 34.7 % (ref 20–45)
MCH RBC QN AUTO: 31.7 PG (ref 26–33)
MCHC RBC AUTO-ENTMCNC: 33.1 G/DL (ref 32–35)
MCV RBC AUTO: 96 FL (ref 75–100)
MONOCYTES ABSOLUTE: 0.74 K/UL (ref 0.1–1)
MONOCYTES RELATIVE PERCENT: 9.3 % (ref 0–13)
NEUTROPHILS ABSOLUTE: 4.22 K/UL (ref 1.9–8)
NEUTROPHILS RELATIVE PERCENT: 53.3 % (ref 45–75)
PDW BLD-RTO: 12.2 % (ref 11.2–14.8)
PLATELET # BLD: 239 THOUS/CMM (ref 140–440)
POTASSIUM SERPL-SCNC: 4 MMOL/L (ref 3.5–5.4)
RBC # BLD: 4.07 MILL/CMM (ref 3.8–5.2)
SODIUM BLD-SCNC: 141 MMOL/L (ref 135–148)
TOTAL PROTEIN: 6.4 G/DL (ref 6–8.3)
VITAMIN D 25-HYDROXY: 52.5 NG/ML (ref 30–100)
WBC # BLD: 7.9 THDS/CMM (ref 3.6–11)

## 2025-06-26 DIAGNOSIS — I10 ESSENTIAL HYPERTENSION: ICD-10-CM

## 2025-06-26 DIAGNOSIS — E87.6 HYPOKALEMIA: ICD-10-CM

## 2025-06-26 RX ORDER — POTASSIUM CHLORIDE 750 MG/1
10 TABLET, EXTENDED RELEASE ORAL 2 TIMES DAILY
Qty: 180 TABLET | Refills: 1 | Status: SHIPPED | OUTPATIENT
Start: 2025-06-26

## 2025-06-26 RX ORDER — AMLODIPINE BESYLATE 5 MG/1
5 TABLET ORAL DAILY
Qty: 90 TABLET | Refills: 1 | Status: SHIPPED | OUTPATIENT
Start: 2025-06-26

## 2025-06-26 NOTE — TELEPHONE ENCOUNTER
The pharmacy is requesting a refill of the below medication which has been pended for you:     Requested Prescriptions     Pending Prescriptions Disp Refills    amLODIPine (NORVASC) 5 MG tablet [Pharmacy Med Name: amLODIPine Besylate 5 MG Oral Tablet] 90 tablet 1     Sig: TAKE 1 TABLET BY MOUTH DAILY    potassium chloride (KLOR-CON M) 10 MEQ extended release tablet [Pharmacy Med Name: Potassium Chloride Amber ER 10 MEQ Oral Tablet Extended Release] 180 tablet 1     Sig: TAKE 1 TABLET BY MOUTH TWICE  DAILY       Last Appointment Date: 2/28/2025  Next Appointment Date: Visit date not found    Allergies   Allergen Reactions    Minocycline Swelling     Other reaction(s): [id number]    Sulfa Antibiotics Swelling     Other reaction(s): swelling    Bactrim Diarrhea    Erythromycin Diarrhea    Hydrocodone-Acetaminophen Other (See Comments)     Headache  Other reaction(s): bad headaches  Other reaction(s): [id number]    Macrobid [Nitrofurantoin] Other (See Comments)     She got a HA, upset stomach and decreased appetite.     Nortriptyline Itching     Other reaction(s): [id number]    Vicodin [Hydrocodone-Acetaminophen] Other (See Comments)     Headache.  severe

## 2025-06-27 ENCOUNTER — OFFICE VISIT (OUTPATIENT)
Dept: FAMILY MEDICINE CLINIC | Age: 75
End: 2025-06-27

## 2025-06-27 VITALS
HEIGHT: 65 IN | BODY MASS INDEX: 28.56 KG/M2 | HEART RATE: 68 BPM | SYSTOLIC BLOOD PRESSURE: 132 MMHG | WEIGHT: 171.4 LBS | DIASTOLIC BLOOD PRESSURE: 78 MMHG | RESPIRATION RATE: 16 BRPM

## 2025-06-27 DIAGNOSIS — I10 ESSENTIAL HYPERTENSION: ICD-10-CM

## 2025-06-27 DIAGNOSIS — K21.9 GASTROESOPHAGEAL REFLUX DISEASE, UNSPECIFIED WHETHER ESOPHAGITIS PRESENT: ICD-10-CM

## 2025-06-27 DIAGNOSIS — M15.0 PRIMARY OSTEOARTHRITIS INVOLVING MULTIPLE JOINTS: ICD-10-CM

## 2025-06-27 DIAGNOSIS — Z00.00 MEDICARE ANNUAL WELLNESS VISIT, SUBSEQUENT: Primary | ICD-10-CM

## 2025-06-27 DIAGNOSIS — I25.10 CORONARY ARTERY DISEASE INVOLVING NATIVE CORONARY ARTERY OF NATIVE HEART WITHOUT ANGINA PECTORIS: ICD-10-CM

## 2025-06-27 ASSESSMENT — ENCOUNTER SYMPTOMS
SHORTNESS OF BREATH: 0
SORE THROAT: 0
TROUBLE SWALLOWING: 0
DIARRHEA: 0
ABDOMINAL PAIN: 0
CONSTIPATION: 0
VOMITING: 0
BACK PAIN: 0
NAUSEA: 0
COUGH: 0
VOICE CHANGE: 0
SINUS PRESSURE: 0
RHINORRHEA: 0
CHEST TIGHTNESS: 0
WHEEZING: 0

## 2025-06-27 ASSESSMENT — PATIENT HEALTH QUESTIONNAIRE - PHQ9
SUM OF ALL RESPONSES TO PHQ QUESTIONS 1-9: 0
1. LITTLE INTEREST OR PLEASURE IN DOING THINGS: NOT AT ALL
2. FEELING DOWN, DEPRESSED OR HOPELESS: NOT AT ALL

## 2025-08-27 RX ORDER — OMEPRAZOLE 20 MG/1
20 CAPSULE, DELAYED RELEASE ORAL
Qty: 90 CAPSULE | Refills: 0 | Status: SHIPPED | OUTPATIENT
Start: 2025-08-27

## 2025-09-04 ENCOUNTER — OFFICE VISIT (OUTPATIENT)
Dept: FAMILY MEDICINE CLINIC | Age: 75
End: 2025-09-04

## 2025-09-04 VITALS
BODY MASS INDEX: 29.52 KG/M2 | HEIGHT: 65 IN | WEIGHT: 177.2 LBS | RESPIRATION RATE: 16 BRPM | HEART RATE: 76 BPM | DIASTOLIC BLOOD PRESSURE: 84 MMHG | SYSTOLIC BLOOD PRESSURE: 132 MMHG

## 2025-09-04 DIAGNOSIS — H93.13 TINNITUS OF BOTH EARS: Primary | ICD-10-CM

## 2025-09-04 DIAGNOSIS — I10 ESSENTIAL HYPERTENSION: ICD-10-CM

## 2025-09-04 ASSESSMENT — ENCOUNTER SYMPTOMS
DIARRHEA: 0
VOMITING: 0
RHINORRHEA: 0
NAUSEA: 0
SHORTNESS OF BREATH: 0
VOICE CHANGE: 0
BACK PAIN: 0
WHEEZING: 0
SINUS PRESSURE: 0
COUGH: 0
CHEST TIGHTNESS: 0
ABDOMINAL PAIN: 0
TROUBLE SWALLOWING: 0
CONSTIPATION: 0
SORE THROAT: 0

## (undated) DEVICE — ASPIRATION & ANTICOAG LINE: Brand: HAEMONETICS ORTHOPAT & CARDIOPAT SYSTEMS

## (undated) DEVICE — GLOVE SURG SZ 65 THK91MIL LTX FREE SYN POLYISOPRENE

## (undated) DEVICE — GAUZE,SPONGE,8"X4",12PLY,XRAY,STRL,LF: Brand: MEDLINE

## (undated) DEVICE — STRYKER PERFORMANCE SERIES SAGITTAL BLADE: Brand: STRYKER PERFORMANCE SERIES

## (undated) DEVICE — QUICKCONNECT RESERVOIR: Brand: HAEMONETICS ORTHOPAT SYSTEM

## (undated) DEVICE — OPAT QUICKCONNECT PROCESS SET: Brand: HAEMONETICS ORTHOPAT SYSTEM

## (undated) DEVICE — MEDI-VAC NON-CONDUCTIVE SUCTION TUBING 6MM X 6.1M (20 FT.) L: Brand: CARDINAL HEALTH

## (undated) DEVICE — TIBURON NEONATAL DRAPE: Brand: CONVERTORS

## (undated) DEVICE — BAND COMPR L24CM REG CLR PLAS HEMSTAT EXT HK AND LOOP RETEN

## (undated) DEVICE — CATHETER DIAG 5FR L100CM LUMN ID0.047IN JL3.5 CRV 0 SIDE H

## (undated) DEVICE — GLOVE ORANGE PI 8 1/2   MSG9085

## (undated) DEVICE — SPONGE GZ W4XL4IN COT 12 PLY TYP VII WVN C FLD DSGN

## (undated) DEVICE — SUTURE ETHBND SZ 1 L30IN NONABSORBABLE GRN OS-6 L36MM 1/2 X538H

## (undated) DEVICE — Device

## (undated) DEVICE — CARDIAC CATH LAB PACK LF

## (undated) DEVICE — 3M™ IOBAN™ 2 ANTIMICROBIAL INCISE DRAPE 6650EZ: Brand: IOBAN™ 2

## (undated) DEVICE — OFF - ST. RITAS VASC: Brand: MEDLINE INDUSTRIES, INC.

## (undated) DEVICE — SUTURE VCRL SZ 0 L27IN ABSRB UD L36MM CP-1 1/2 CIR REV CUT J267H

## (undated) DEVICE — CATHETER 5FR JR4 CORDIS 100CM

## (undated) DEVICE — PAD,ABDOMINAL,5"X9",ST,LF,25/BX: Brand: MEDLINE INDUSTRIES, INC.

## (undated) DEVICE — GLOVE ORANGE PI 7   MSG9070

## (undated) DEVICE — GLIDESHEATH SLENDER ACCESS KIT: Brand: GLIDESHEATH SLENDER

## (undated) DEVICE — GLOVE SURG SZ 85 L12IN THK75MIL DK GRN LTX FREE

## (undated) DEVICE — CHLORAPREP 26ML ORANGE

## (undated) DEVICE — NEEDLE SUT MAYO TAPR NO 2 1/2 CIR

## (undated) DEVICE — BASIC SINGLE BASIN BTC-LF: Brand: MEDLINE INDUSTRIES, INC.

## (undated) DEVICE — SUTURE VCRL SZ 2-0 L27IN ABSRB UD L36MM CP-1 1/2 CIR REV J266H

## (undated) DEVICE — ULTRACLEAN ACCESSORY ELECTRODE 6" (15.24 CM) COATED BLADE: Brand: ULTRACLEAN

## (undated) DEVICE — 4-PORT MANIFOLD: Brand: NEPTUNE 2

## (undated) DEVICE — GOWN,SIRUS,NON REINFRCD,LARGE,SET IN SL: Brand: MEDLINE

## (undated) DEVICE — 260 CM J TIP WIRE .035